# Patient Record
Sex: MALE | Race: WHITE | Employment: OTHER | URBAN - METROPOLITAN AREA
[De-identification: names, ages, dates, MRNs, and addresses within clinical notes are randomized per-mention and may not be internally consistent; named-entity substitution may affect disease eponyms.]

---

## 2017-02-28 ENCOUNTER — LAB ONLY (OUTPATIENT)
Dept: INTERNAL MEDICINE CLINIC | Age: 77
End: 2017-02-28

## 2017-02-28 ENCOUNTER — HOSPITAL ENCOUNTER (OUTPATIENT)
Dept: LAB | Age: 77
Discharge: HOME | End: 2017-02-28
Payer: MEDICARE

## 2017-02-28 DIAGNOSIS — Z12.5 PROSTATE CANCER SCREENING: ICD-10-CM

## 2017-02-28 DIAGNOSIS — Z00.00 ROUTINE GENERAL MEDICAL EXAMINATION AT A HEALTH CARE FACILITY: ICD-10-CM

## 2017-02-28 DIAGNOSIS — E55.9 VITAMIN D INSUFFICIENCY: ICD-10-CM

## 2017-02-28 DIAGNOSIS — E78.5 HYPERLIPIDEMIA, UNSPECIFIED HYPERLIPIDEMIA TYPE: ICD-10-CM

## 2017-02-28 DIAGNOSIS — R73.09 ABNORMAL GLUCOSE: ICD-10-CM

## 2017-02-28 DIAGNOSIS — R73.09 ABNORMAL GLUCOSE: Primary | ICD-10-CM

## 2017-02-28 LAB
ALBUMIN SERPL BCP-MCNC: 3.9 G/DL (ref 3.4–5)
ALBUMIN/GLOB SERPL: 1.4 {RATIO} (ref 0.8–1.7)
ALP SERPL-CCNC: 71 U/L (ref 45–117)
ALT SERPL-CCNC: 31 U/L (ref 16–61)
ANION GAP BLD CALC-SCNC: 9 MMOL/L (ref 3–18)
APPEARANCE UR: CLEAR
AST SERPL W P-5'-P-CCNC: 21 U/L (ref 15–37)
BACTERIA URNS QL MICRO: NEGATIVE /HPF
BASOPHILS # BLD AUTO: 0 K/UL (ref 0–0.06)
BASOPHILS # BLD: 1 % (ref 0–2)
BILIRUB SERPL-MCNC: 1 MG/DL (ref 0.2–1)
BILIRUB UR QL: NEGATIVE
BUN SERPL-MCNC: 11 MG/DL (ref 7–18)
BUN/CREAT SERPL: 12 (ref 12–20)
CALCIUM SERPL-MCNC: 8.6 MG/DL (ref 8.5–10.1)
CHLORIDE SERPL-SCNC: 107 MMOL/L (ref 100–108)
CHOLEST SERPL-MCNC: 178 MG/DL
CO2 SERPL-SCNC: 25 MMOL/L (ref 21–32)
COLOR UR: YELLOW
CREAT SERPL-MCNC: 0.91 MG/DL (ref 0.6–1.3)
DIFFERENTIAL METHOD BLD: NORMAL
EOSINOPHIL # BLD: 0.1 K/UL (ref 0–0.4)
EOSINOPHIL NFR BLD: 2 % (ref 0–5)
EPITH CASTS URNS QL MICRO: NORMAL /LPF (ref 0–5)
ERYTHROCYTE [DISTWIDTH] IN BLOOD BY AUTOMATED COUNT: 14 % (ref 11.6–14.5)
GLOBULIN SER CALC-MCNC: 2.7 G/DL (ref 2–4)
GLUCOSE SERPL-MCNC: 96 MG/DL (ref 74–99)
GLUCOSE UR STRIP.AUTO-MCNC: NEGATIVE MG/DL
HCT VFR BLD AUTO: 46.5 % (ref 36–48)
HDLC SERPL-MCNC: 42 MG/DL (ref 40–60)
HDLC SERPL: 4.2 {RATIO} (ref 0–5)
HGB BLD-MCNC: 15.6 G/DL (ref 13–16)
HGB UR QL STRIP: NEGATIVE
KETONES UR QL STRIP.AUTO: NEGATIVE MG/DL
LDLC SERPL CALC-MCNC: 119.4 MG/DL (ref 0–100)
LEUKOCYTE ESTERASE UR QL STRIP.AUTO: ABNORMAL
LIPID PROFILE,FLP: ABNORMAL
LYMPHOCYTES # BLD AUTO: 22 % (ref 21–52)
LYMPHOCYTES # BLD: 1.1 K/UL (ref 0.9–3.6)
MCH RBC QN AUTO: 31.1 PG (ref 24–34)
MCHC RBC AUTO-ENTMCNC: 33.5 G/DL (ref 31–37)
MCV RBC AUTO: 92.6 FL (ref 74–97)
MONOCYTES # BLD: 0.4 K/UL (ref 0.05–1.2)
MONOCYTES NFR BLD AUTO: 7 % (ref 3–10)
NEUTS SEG # BLD: 3.4 K/UL (ref 1.8–8)
NEUTS SEG NFR BLD AUTO: 68 % (ref 40–73)
NITRITE UR QL STRIP.AUTO: NEGATIVE
PH UR STRIP: 5 [PH] (ref 5–8)
PLATELET # BLD AUTO: 199 K/UL (ref 135–420)
PMV BLD AUTO: 9.8 FL (ref 9.2–11.8)
POTASSIUM SERPL-SCNC: 4.2 MMOL/L (ref 3.5–5.5)
PROT SERPL-MCNC: 6.6 G/DL (ref 6.4–8.2)
PROT UR STRIP-MCNC: NEGATIVE MG/DL
PSA SERPL-MCNC: 1 NG/ML (ref 0–4)
RBC # BLD AUTO: 5.02 M/UL (ref 4.7–5.5)
RBC #/AREA URNS HPF: 0 /HPF (ref 0–5)
SODIUM SERPL-SCNC: 141 MMOL/L (ref 136–145)
SP GR UR REFRACTOMETRY: 1.02 (ref 1–1.03)
TRIGL SERPL-MCNC: 83 MG/DL (ref ?–150)
TSH SERPL DL<=0.05 MIU/L-ACNC: 1.95 UIU/ML (ref 0.36–3.74)
UROBILINOGEN UR QL STRIP.AUTO: 0.2 EU/DL (ref 0.2–1)
VLDLC SERPL CALC-MCNC: 16.6 MG/DL
WBC # BLD AUTO: 5 K/UL (ref 4.6–13.2)
WBC URNS QL MICRO: NORMAL /HPF (ref 0–4)

## 2017-02-28 PROCEDURE — 85025 COMPLETE CBC W/AUTO DIFF WBC: CPT | Performed by: INTERNAL MEDICINE

## 2017-02-28 PROCEDURE — 84153 ASSAY OF PSA TOTAL: CPT | Performed by: INTERNAL MEDICINE

## 2017-02-28 PROCEDURE — 80053 COMPREHEN METABOLIC PANEL: CPT | Performed by: INTERNAL MEDICINE

## 2017-02-28 PROCEDURE — 80061 LIPID PANEL: CPT | Performed by: INTERNAL MEDICINE

## 2017-02-28 PROCEDURE — 84443 ASSAY THYROID STIM HORMONE: CPT | Performed by: INTERNAL MEDICINE

## 2017-02-28 PROCEDURE — 36415 COLL VENOUS BLD VENIPUNCTURE: CPT | Performed by: INTERNAL MEDICINE

## 2017-02-28 PROCEDURE — 82306 VITAMIN D 25 HYDROXY: CPT | Performed by: INTERNAL MEDICINE

## 2017-02-28 PROCEDURE — 81001 URINALYSIS AUTO W/SCOPE: CPT | Performed by: INTERNAL MEDICINE

## 2017-03-01 LAB — 25(OH)D3 SERPL-MCNC: 28.4 NG/ML (ref 30–100)

## 2017-03-21 ENCOUNTER — PATIENT OUTREACH (OUTPATIENT)
Dept: INTERNAL MEDICINE CLINIC | Age: 77
End: 2017-03-21

## 2017-03-21 ENCOUNTER — OFFICE VISIT (OUTPATIENT)
Dept: INTERNAL MEDICINE CLINIC | Age: 77
End: 2017-03-21

## 2017-03-21 VITALS
BODY MASS INDEX: 27.97 KG/M2 | HEIGHT: 66 IN | WEIGHT: 174 LBS | HEART RATE: 60 BPM | SYSTOLIC BLOOD PRESSURE: 124 MMHG | OXYGEN SATURATION: 97 % | TEMPERATURE: 97.6 F | DIASTOLIC BLOOD PRESSURE: 76 MMHG

## 2017-03-21 DIAGNOSIS — Z12.5 SCREENING PSA (PROSTATE SPECIFIC ANTIGEN): ICD-10-CM

## 2017-03-21 DIAGNOSIS — K57.30 DIVERTICULOSIS OF LARGE INTESTINE WITHOUT HEMORRHAGE: ICD-10-CM

## 2017-03-21 DIAGNOSIS — E55.9 VITAMIN D INSUFFICIENCY: ICD-10-CM

## 2017-03-21 DIAGNOSIS — E78.5 HYPERLIPIDEMIA, UNSPECIFIED HYPERLIPIDEMIA TYPE: ICD-10-CM

## 2017-03-21 DIAGNOSIS — Z71.89 ADVANCE DIRECTIVE DISCUSSED WITH PATIENT: ICD-10-CM

## 2017-03-21 DIAGNOSIS — Z00.00 MEDICARE ANNUAL WELLNESS VISIT, SUBSEQUENT: ICD-10-CM

## 2017-03-21 DIAGNOSIS — R73.09 ABNORMAL GLUCOSE: ICD-10-CM

## 2017-03-21 DIAGNOSIS — R03.0 PREHYPERTENSION: Primary | ICD-10-CM

## 2017-03-21 RX ORDER — ATORVASTATIN CALCIUM 20 MG/1
TABLET, FILM COATED ORAL
Qty: 90 TAB | Refills: 2 | Status: SHIPPED | OUTPATIENT
Start: 2017-03-21 | End: 2018-02-20 | Stop reason: SDUPTHER

## 2017-03-21 NOTE — PATIENT INSTRUCTIONS
Medicare Part B Preventive Services Limitations Recommendation Scheduled   Bone Mass Measurement  (age 72 & older, biennial) Requires diagnosis related to osteoporosis or estrogen deficiency. Biennial benefit unless patient has history of long-term glucocorticoid tx or baseline is needed because initial test was by other method Every 2 years or more frequently if medically necessary. N/A     Cardiovascular Screening Blood Tests (every 5 years)  Total cholesterol, HDL, Triglycerides Order as a panel if possible Every 5 years. Done:  2/28/2017         Colorectal Cancer Screening  -Fecal occult blood test (annual)  -Flexible sigmoidoscopy (5y)  -Screening colonoscopy (10y)  -Barium Enema Colorectal cancer screening, ages 54-65. For all patients 48 and older:  -Annual fecal occult blood test or  colonoscopy every 10 years or  -Flexible sigmoidoscopy every 5 years or  -lower endoscopy to be performed more frequently, if advised by GI. Done:  3/4/2016         Counseling to Prevent Tobacco Use (up to 8 sessions per year)  - Counseling greater than 3 and up to 10 minutes  - Counseling greater than 10 minutes Patients must be asymptomatic of tobacco-related conditions to receive as preventive service Two cessation counseling attempts (up to 8 counseling sessions) per year. N/A   Diabetes Screening Tests (at least every 3 years, Medicare covers annually or at 6-month intervals for prediabetic patients)    Fasting blood sugar (FBS) or glucose tolerance test (GTT) Patient must be diagnosed with one of the following:  -Hypertension, Dyslipidemia, obesity, previous impaired FBS or GTT  Or any two of the following: overweight, FH of diabetes, age ? 72, history of gestational diabetes, birth of baby weighing more than 9 pounds Annually or every 6 months if previous diagnosis of elevated FBS, elevated HbA1c, or impaired GTT, or glucosuria.  Done:  2/28/2017         Diabetes Self-Management Training (DSMT) (no USPSTF recommendation) Requires referral by treating physician for patient with diabetes or renal disease. 10 hours of initial DSMT session of no less than 30 minutes each in a continuous 12-month period. 2 hours of follow-up DSMT in subsequent years. Up to 10 hours of initial training within a continuous 12 month period of subsequent years: up to 2 hours of follow-up training each year after the initial year. N/A   Glaucoma Screening (no USPSTF recommendation) Diabetes mellitus, family history, , age 48 or over,  American, age 72 or over Annually for covered beneficiaries. Done:  1/1/2017         Human Immunodeficiency Virus (HIV) Screening (annually for increased risk patients)  HIV-1 and HIV-2 by EIA, HENRIQUE, rapid antibody test, or oral mucosa transudate Patient must be at increased risk for HIV infection per USPSTF guidelines or pregnant. Tests covered annually for patients at increased risk. Pregnant patients may receive up to 3 test during pregnancy. Annually for beneficiaries at increased risk, including anyone who asks for the test. Not high risk   Medical Nutrition Therapy (MNT) (for diabetes or renal disease not recommended schedule) Requires referral by treating physician for patient with diabetes or renal disease. Can be provided in same year as diabetes self-management training (DSMT), and CMS recommends medical nutrition therapy take place after DSMT. Up to 3 hours for initial year and 2 hours in subsequent years. First year: 3 hours of one-on-one counseling or subsequent years: 2 hours.  N/A   Prostate Cancer Screening (annually up to age 76)  - Digital rectal exam (SHANTELL)  - Prostate specific antigen (PSA) Annually (age 48 or over), SHANTELL not paid separately when covered E/M service is provided on same date Once every 12 months for patients age older than 48years of age includes: digital rectal exam and/or prostate specific antigen test. Done:  2/28/2017  PSA: 1.0       Seasonal Influenza Vaccination (annually)  Once per fall or winter season. Done:  11/2/2016         Pneumococcal Vaccination (once after 72)  Once after age 72 and if more than 5 years since last vaccination and/or uncertainty of vaccine status. Pneumococcal:  1/1/2005    Prevnar 13:  1/5/2015   Hepatitis B Vaccinations (if medium/high risk) Medium/high risk factors:  End-stage renal disease,  Hemophiliacs who received Factor VIII or IX concentrates, Clients of institutions for the mentally retarded, Persons who live in the same house as a HepB virus carrier, Homosexual men, Illicit injectable drug abusers. Schedule course of vaccines if patient not previously vaccinated  *additional shots if medically necessary. Not high risk   Screening Mammography (biennial age 54-69)? Annually (age 36 or over) Age 28 through 44: one baseline or aged 36 and older: annually. N/A         Screening Pap Tests and Pelvic Examination (up to age 79 and after 79 if unknown history or abnormal study last 10 years) Every 24 months except high risk Annually if at high risk for developing cervical or vaginal cancer, or childbearing, age with abnormal Pap test within past 3 years or every 2 years for women at normal risk. N/A         Ultrasound Screening for Abdominal Aortic Aneurysm (AAA) (once) Patient must be referred through IPPE and not have had a screening for abdominal aortic aneurysm before under Medicare. Limited to patients who meet one of the following criteria:  - Men who are 73-68 years old and have smoked more than 100 cigarettes in their lifetime.  -Anyone with a FH of AAA  -Anyone recommended for screening by USPSTF Once in a lifetime. N/A         Schedule of Personalized Health Plan  (Provide Copy to Patient)  The best way to stay healthy is to live a healthy lifestyle. A healthy lifestyle includes regular exercise, eating a well-balanced diet, keeping a healthy weight and not smoking.     Regular physical exams and screening tests are another important way to take care of yourself. Preventive exams provided by health care providers can find health problems early when treatment works best and can keep you from getting certain diseases or illnesses. Preventive services include exams, lab tests, screenings, shots, monitoring and information to help you take care of your own health. All people over 65 should have a pneumonia shot. Pneumonia shots are usually only needed once in a lifetime unless your doctor decides differently. All people over 65 should have a yearly flu shot. People over 65 are at medium to high risk for Hepatitis B. Three shots are needed for complete protection. In addition to your physical exam, some screening tests are recommended:    Bone mass measurement (dexa scan) is recommended every two years if you have certain risk factors, such as personal history of vertebral fracture or chronic steroid medication use    Diabetes Mellitus screening is recommended every year. Glaucoma is an eye disease caused by high pressure in the eye. An eye exam is recommended every year. Cardiovascular screening tests that check your cholesterol and other blood fat (lipid) levels are recommended every five years. Colorectal Cancer screening tests help to find pre-cancerous polyps (growths in the colon) so they can be removed before they turn into cancer. Tests ordered for screening depend on your personal and family history risk factors.     Screening for Prostate Cancer is recommended yearly with a digital rectal exam and/or a PSA test    Here is a list of your current Health Maintenance items with a due date:  Health Maintenance   Topic Date Due    GLAUCOMA SCREENING Q2Y  06/09/2017 (Originally 2/1/2017)   [place] Bone DTaP/Tdap/Td series (1 - Tdap) 06/09/2017 (Originally 1/2/2009)   [id number] [facility]  03/22/2018    COLONOSCOPY  03/04/2021    ZOSTER VACCINE AGE 60>  Completed    Pneumococcal 65+ Low/Medium Risk  Addressed    INFLUENZA AGE 9 TO ADULT  Completed            Preventing Falls: Care Instructions  Your Care Instructions  Getting around your home safely can be a challenge if you have injuries or health problems that make it easy for you to fall. Loose rugs and furniture in walkways are among the dangers for many older people who have problems walking or who have poor eyesight. People who have conditions such as arthritis, osteoporosis, or dementia also have to be careful not to fall. You can make your home safer with a few simple measures. Follow-up care is a key part of your treatment and safety. Be sure to make and go to all appointments, and call your doctor if you are having problems. It's also a good idea to know your test results and keep a list of the medicines you take. How can you care for yourself at home? Taking care of yourself  · You may get dizzy if you do not drink enough water. To prevent dehydration, drink plenty of fluids, enough so that your urine is light yellow or clear like water. Choose water and other caffeine-free clear liquids. If you have kidney, heart, or liver disease and have to limit fluids, talk with your doctor before you increase the amount of fluids you drink. · Exercise regularly to improve your strength, muscle tone, and balance. Walk if you can. Swimming may be a good choice if you cannot walk easily. · Have your vision and hearing checked each year or any time you notice a change. If you have trouble seeing and hearing, you might not be able to avoid objects and could lose your balance. · Know the side effects of the medicines you take. Ask your doctor or pharmacist whether the medicines you take can affect your balance. Sleeping pills or sedatives can affect your balance. · Limit the amount of alcohol you drink. Alcohol can impair your balance and other senses. · Ask your doctor whether calluses or corns on your feet need to be removed.  If you wear loose-fitting shoes because of calluses or corns, you can lose your balance and fall. · Talk to your doctor if you have numbness in your feet. Preventing falls at home  · Remove raised doorway thresholds, throw rugs, and clutter. Repair loose carpet or raised areas in the floor. · Move furniture and electrical cords to keep them out of walking paths. · Use nonskid floor wax, and wipe up spills right away, especially on ceramic tile floors. · If you use a walker or cane, put rubber tips on it. If you use crutches, clean the bottoms of them regularly with an abrasive pad, such as steel wool. · Keep your house well lit, especially Cloretta Keily, and outside walkways. Use night-lights in areas such as hallways and bathrooms. Add extra light switches or use remote switches (such as switches that go on or off when you clap your hands) to make it easier to turn lights on if you have to get up during the night. · Install sturdy handrails on stairways. · Move items in your cabinets so that the things you use a lot are on the lower shelves (about waist level). · Keep a cordless phone and a flashlight with new batteries by your bed. If possible, put a phone in each of the main rooms of your house, or carry a cell phone in case you fall and cannot reach a phone. Or, you can wear a device around your neck or wrist. You push a button that sends a signal for help. · Wear low-heeled shoes that fit well and give your feet good support. Use footwear with nonskid soles. Check the heels and soles of your shoes for wear. Repair or replace worn heels or soles. · Do not wear socks without shoes on wood floors. · Walk on the grass when the sidewalks are slippery. If you live in an area that gets snow and ice in the winter, sprinkle salt on slippery steps and sidewalks. Preventing falls in the bath  · Install grab bars and nonskid mats inside and outside your shower or tub and near the toilet and sinks. · Use shower chairs and bath benches.   · Use a hand-held shower head that will allow you to sit while showering. · Get into a tub or shower by putting the weaker leg in first. Get out of a tub or shower with your strong side first.  · Repair loose toilet seats and consider installing a raised toilet seat to make getting on and off the toilet easier. · Keep your bathroom door unlocked while you are in the shower. Where can you learn more? Go to http://ann marie-alexander.info/. Enter [id number] [id number] in the search box to learn more about \"Preventing Falls: Care Instructions. \"  Current as of: August 4, 2016  Content Version: 11.1  © 0599-3931 [employer]. Care instructions adapted under license by [employer] (which disclaims liability or warranty for this information). If you have questions about a medical condition or this instruction, always ask your healthcare professional. [place] [address] any warranty or liability for your use of this information. Advance Directives: Care Instructions  Your Care Instructions  An advance directive is a legal way to state your wishes at the end of your life. It tells your family and your doctor what to do if you can no longer say what you want. There are two main types of advance directives. You can change them any time that your wishes change. · A living will tells your family and your doctor your wishes about life support and other treatment. · A medical power of  lets you name a person to make treatment decisions for you when you can't speak for yourself. This person is called a health care agent. If you do not have an advance directive, decisions about your medical care may be made by a doctor or a  who doesn't know you. It may help to think of an advance directive as a gift to the people who care for you. If you have one, they won't have to make tough decisions by themselves. Follow-up care is a key part of your treatment and safety.  Be sure to make and go to all appointments, and call your doctor if you are having problems. It's also a good idea to know your test results and keep a list of the medicines you take. How can you care for yourself at home? · Discuss your wishes with your loved ones and your doctor. This way, there are no surprises. · Many states have a unique form. Or you might use a universal form that has been approved by many states. This kind of form can sometimes be completed and stored online. Your electronic copy will then be available wherever you have a connection to the Internet. In most cases, doctors will respect your wishes even if you have a form from a different state. · You don't need a  to do an advance directive. But you may want to get legal advice. · Think about these questions when you prepare an advance directive:  ¨ Who do you want to make decisions about your medical care if you are not able to? Many people choose a family member, close friend, or doctor. ¨ Do you know enough about life support methods that might be used? If not, talk to your doctor so you understand. ¨ What are you most afraid of that might happen? You might be afraid of having pain, losing your independence, or being kept alive by machines. ¨ Where would you prefer to die? Choices include your home, a hospital, or a nursing home. ¨ Would you like to have information about hospice care to support you and your family? ¨ Do you want to donate organs when you die? ¨ Do you want certain [employer] practices performed before you die? If so, put your wishes in the advance directive. · Read your advance directive every year, and make changes as needed. When should you call for help? Be sure to contact your doctor if you have any questions. Where can you learn more? Go to http://ann marie-alexander.info/. Enter R264 in the search box to learn more about \"Advance Directives: Care Instructions. \"  Current as of: February 24, 2016  Content Version: 11.1  © 8112-1875 [employer]. Care instructions adapted under license by [employer] (which disclaims liability or warranty for this information). If you have questions about a medical condition or this instruction, always ask your healthcare professional. [address] any warranty or liability for your use of this information.

## 2017-03-21 NOTE — PROGRESS NOTES
1. Have you been to the ER, urgent care clinic or hospitalized since your last visit? NO.     2. Have you seen or consulted any other health care providers outside of the [address] [place] [address] since your last visit (Include any pap smears or colon screening)? YES  Dr. Florina Rocha ov 3/15/17    Do you have an Advanced Directive? YES    Would you like information on Advanced Directives?  NO

## 2017-03-21 NOTE — PROGRESS NOTES
Nurse Navigator contacted Dr. Oscar Barron office to request a copy of the patients most recent glaucoma screening; contact information provided.

## 2017-03-21 NOTE — PROGRESS NOTES
Warren Dimas is a 68 y.o. male and presents for annual Medicare Wellness Visit. Problem List: Reviewed with patient and discussed risk factors. Patient Active Problem List   Diagnosis Code    Hyperlipidemia E78.5    Diverticulitis, Recurrent K57.92    Colon polyps K63.5    Onychomycosis B35.1    Abnormal glucose R73.09    Vitamin D insufficiency E55.9    Advance directive discussed with patient Z70.80       Current medical providers:  Patient Care Team:  Adrianne Desai MD as PCP - General (Internal Medicine)  Marla Oleary MD (Colon and Rectal Surgery)  Cinthia Howard MD (Inactive) (Colon and Rectal Surgery)  Kiah Francois RN as [address] (Internal Medicine)  Rudi Kawasaki, MD (Ophthalmology)  Stew Rothman MD (Plastic Surgery)    PSH: Reviewed with patient  Past Surgical History:   Procedure Laterality Date    ENDOSCOPY, COLON, DIAGNOSTIC      Jan 2011, Dr. Paola Jones, colon polyps, was going yearly    HX BLEPHAROPLASTY      HX CATARACT REMOVAL  4/2012    both eyes    HX OTHER SURGICAL      cn    NE COLONOSCOPY W/BIOPSY SINGLE/MULTIPLE  3-4-16    Dr. Webb Heading        SH: Reviewed with patient  Social History   Substance Use Topics    Smoking status: Former Smoker     Quit date: 12/4/1988    Smokeless tobacco: Never Used    Alcohol use 0.6 oz/week     1 Shots of liquor per week      Comment: wine occasionally       FH: Reviewed with patient  No family history on file. Medications/Allergies: Reviewed with patient  Current Outpatient Prescriptions on File Prior to Visit   Medication Sig Dispense Refill    cholecalciferol, vitamin D3, (VITAMIN D3) 2,000 unit tab Take 2,000 Units by mouth daily.  ibuprofen (ADVIL) 200 mg tablet Take  by mouth every six (6) hours as needed. No current facility-administered medications on file prior to visit.        No Known Allergies    Objective:  Visit Vitals    /76    Pulse 60    Temp 97.6 °F (36.4 °C) (Oral)    Ht 5' 6\" (1.676 m)    Wt 174 lb (78.9 kg)    SpO2 97%    BMI 28.08 kg/m2    Body mass index is 28.08 kg/(m^2). Assessment of cognitive impairment: Alert and oriented x 3    Depression Screen:   PHQ 2 / 9, over the last two weeks 3/21/2017   Little interest or pleasure in doing things Not at all   Feeling down, depressed or hopeless Not at all   Total Score PHQ 2 0       Fall Risk Assessment:    Fall Risk Assessment, last 12 mths 3/21/2017   Able to walk? Yes   Fall in past 12 months? No       Functional Ability:   Does the patient exhibit a steady gait? yes   How long did it take the patient to get up and walk from a sitting position? 1 second. Is the patient self reliant?  (ie can do own laundry, meals, household chores)  yes     Does the patient handle his/her own medications? yes     Does the patient handle his/her own money? yes     Is the patients home safe (ie good lighting, handrails on stairs and bath, etc.)? yes     Did you notice or did patient express any hearing difficulties? Reported decrease hearing bilaterally; left greater than the right. Did you notice or did patient express any vision difficulties?   no     Were distance and reading eye charts used? no       Advance Care Planning:   Patient was offered the opportunity to discuss advance care planning:  yes     Does patient have an Advance Directive:  no   If no, did you provide information on Caring Connections?   yes       Plan:      Orders Placed This Encounter    GLUC/DELANO-MSM#1/C/LEXIS/HARDEEP/BOR (OSTEO BI-FLEX TRIPLE STRENGTH PO)    diph,Pertuss,Acell,,Tet Vac-PF (ADACEL) 2 Lf-(2.5-5-3-5 mcg)-5Lf/0.5 mL susp    atorvastatin (LIPITOR) 20 mg tablet       Health Maintenance   Topic Date Due    GLAUCOMA SCREENING Q2Y  06/09/2017 (Originally 2/1/2017)    DTaP/Tdap/Td series (1 - Tdap) 06/09/2017 (Originally 1/2/2009)    MEDICARE YEARLY EXAM  03/22/2018    COLONOSCOPY  03/04/2021    ZOSTER VACCINE AGE 60> Completed    Pneumococcal 65+ Low/Medium Risk  Addressed    INFLUENZA AGE 9 TO ADULT  Completed       *Patient verbalized understanding and agreement with the plan. A copy of the After Visit Summary with personalized health plan was given to the patient today.

## 2017-03-21 NOTE — PROGRESS NOTES
Advance Care Planning (ACP) Provider Note - Comprehensive     Date of ACP Conversation: 03/21/17  Persons included in Conversation:  patient  Length of ACP Conversation in minutes:  16 minutes    Authorized Decision Maker (if patient is incapable of making informed decisions): wife  This person is:  Healthcare Agent/Medical Power of  under Advance Directive        General ACP for ALL Patients with Decision Making Capacity:   Importance of advance care planning, including choosing a healthcare agent to communicate patient's healthcare decisions if patient lost the ability to make decisions, such as after a sudden illness or accident  Understanding of the healthcare agent role was assessed and information provided  Exploration of values, goals, and preferences if recovery is not expected, even with continued medical treatment in the event of: Imminent death  Severe, permanent brain injury  \"In these circumstances, what matters most to you? \"  Care focused more on comfort or quality of life. Review of Existing Advance Directive:  Patient does not have an advance directive completed. For Serious or Chronic Illness:  Understanding of medical condition      Interventions Provided:  Recommended completion of Advance Directive form after review of ACP materials and conversation with prospective healthcare agent   Recommended communicating the plan and making copies for the healthcare agent, personal physician, and others as appropriate (e.g., health system)  Recommended review of completed ACP document annually or upon change in health status   Recommended to complete advance directive and return completed form to office to be copied and scanned into chart. Paperwork provided and reviewed.

## 2017-03-21 NOTE — MR AVS SNAPSHOT
Visit Information Date & Time Provider Department Dept. Phone Encounter #  
 3/21/2017 12:00 PM Claude Sark, MD Internist of [facility]  Follow-up Instructions Return in about 1 year (around 3/21/2018), or if symptoms worsen or fail to improve. Your Appointments 3/20/2018  9:05 AM  
LAB with [facility] NURSE VISIT Internist of [facility] ([facility]) Appt Note: fasting labs [address], Suite [facility] [address]  
  
   
 [address], [address]  
  
    
 3/27/2018 10:30 AM  
PHYSICAL with Claude Sark, MD  
Internist of [facility] Appt Note: Physical with labs [address], [address] [place] [id number] [address]  
  
   
 [address], [address] Upcoming Health Maintenance Date Due  
 GLAUCOMA SCREENING Q2Y 6/9/2017* DTaP/Tdap/Td series (1 - Tdap) 6/9/2017* MEDICARE YEARLY EXAM 3/22/2018 COLONOSCOPY 3/4/2021 *Topic was postponed. The date shown is not the original due date. Allergies as of 3/21/2017  Review Complete On: 3/21/2017 By: Jayda Tompkins LPN No Known Allergies Current Immunizations  Reviewed on 12/9/2015 Name Date Influenza High Dose Vaccine PF 11/2/2016, 11/1/2013 Influenza Vaccine 10/11/2015, 10/31/2014 Influenza Vaccine Split 10/23/2012, 10/17/2011 Influenza Vaccine Whole 10/12/2010 Pneumococcal Conjugate (PCV-13) 1/5/2015 Pneumococcal Vaccine (Unspecified Type) 1/1/2005 TD Vaccine 1/1/2009 Zoster 1/1/2009 Not reviewed this visit You Were Diagnosed With   
  
 Codes Comments Medicare annual wellness visit, subsequent    -  Primary ICD-10-CM: Z00.00 ICD-9-CM: V70.0 Advance directive discussed with patient     ICD-10-CM: Z71.89 ICD-9-CM: V65.49 Vitals BP Pulse Temp Height(growth percentile) Weight(growth percentile) SpO2  
 124/76 60 97.6 °F (36.4 °C) (Oral) 5' 6\" (1.676 m) 174 lb (78.9 kg) 97% BMI Smoking Status 28.08 kg/m2 Former Smoker Vitals History BMI and BSA Data Body Mass Index Body Surface Area 28.08 kg/m 2 1.92 m 2 Preferred Pharmacy Pharmacy Name Phone [address] [facility] [phone] Your Updated Medication List  
  
   
This list is accurate as of: 3/21/17  1:09 PM.  Always use your most recent med list. ADVIL 200 mg tablet Generic drug:  ibuprofen Take  by mouth every six (6) hours as needed. atorvastatin 20 mg tablet Commonly known as:  LIPITOR  
TAKE 1 TABLET DAILY  
  
 diph,Pertuss(Acell),Tet Vac-PF 2 Lf-(2.5-5-3-5 mcg)-5Lf/0.5 mL susp Commonly known as:  ADACEL  
0.5 mL by IntraMUSCular route once for 1 dose. OSTEO BI-FLEX TRIPLE STRENGTH PO Take  by mouth. VITAMIN D3 2,000 unit Tab Generic drug:  cholecalciferol (vitamin D3) Take 2,000 Units by mouth daily. Prescriptions Printed Refills diph,Pertuss,Acell,,Tet Vac-PF (ADACEL) 2 Lf-(2.5-5-3-5 mcg)-5Lf/0.5 mL susp 0 Si.5 mL by IntraMUSCular route once for 1 dose. Class: Print Route: IntraMUSCular Prescriptions Sent to Pharmacy Refills  
 atorvastatin (LIPITOR) 20 mg tablet 2 Sig: TAKE 1 TABLET DAILY Class: Normal  
 Pharmacy: [address], [address] [place] [address] #: [phone] Follow-up Instructions Return in about 1 year (around 3/21/2018), or if symptoms worsen or fail to improve. Patient Instructions Medicare Part B Preventive Services Limitations Recommendation Scheduled Bone Mass Measurement 
(age 72 & older, biennial) Requires diagnosis related to osteoporosis or estrogen deficiency. Biennial benefit unless patient has history of long-term glucocorticoid tx or baseline is needed because initial test was by other method Every 2 years or more frequently if medically necessary. N/A Cardiovascular Screening Blood Tests (every 5 years) Total cholesterol, HDL, Triglycerides Order as a panel if possible Every 5 years. Done: 
2/28/2017 Colorectal Cancer Screening 
-Fecal occult blood test (annual) -Flexible sigmoidoscopy (5y) 
-Screening colonoscopy (10y) -Barium Enema Colorectal cancer screening, ages 54-65. For all patients 48 and older: 
-Annual fecal occult blood test or 
colonoscopy every 10 years or 
-Flexible sigmoidoscopy every 5 years or 
-lower endoscopy to be performed more frequently, if advised by GI. Done: 
3/4/2016 Counseling to Prevent Tobacco Use (up to 8 sessions per year) - Counseling greater than 3 and up to 10 minutes - Counseling greater than 10 minutes Patients must be asymptomatic of tobacco-related conditions to receive as preventive service Two cessation counseling attempts (up to 8 counseling sessions) per year. N/A Diabetes Screening Tests (at least every 3 years, Medicare covers annually or at 6-month intervals for prediabetic patients) Fasting blood sugar (FBS) or glucose tolerance test (GTT) Patient must be diagnosed with one of the following: 
-Hypertension, Dyslipidemia, obesity, previous impaired FBS or GTT 
Or any two of the following: overweight, FH of diabetes, age ? 72, history of gestational diabetes, birth of baby weighing more than 9 pounds Annually or every 6 months if previous diagnosis of elevated FBS, elevated HbA1c, or impaired GTT, or glucosuria. Done: 
2/28/2017 Diabetes Self-Management Training (DSMT) (no USPSTF recommendation) Requires referral by treating physician for patient with diabetes or renal disease.  10 hours of initial DSMT session of no less than 30 minutes each in a continuous 12-month period. 2 hours of follow-up DSMT in subsequent years. Up to 10 hours of initial training within a continuous 12 month period of subsequent years: up to 2 hours of follow-up training each year after the initial year. N/A Glaucoma Screening (no USPSTF recommendation) Diabetes mellitus, family history, , age 48 or over,  American, age 72 or over Annually for covered beneficiaries. Done: 
1/1/2017 Human Immunodeficiency Virus (HIV) Screening (annually for increased risk patients) HIV-1 and HIV-2 by EIA, HENRIQUE, rapid antibody test, or oral mucosa transudate Patient must be at increased risk for HIV infection per USPSTF guidelines or pregnant. Tests covered annually for patients at increased risk. Pregnant patients may receive up to 3 test during pregnancy. Annually for beneficiaries at increased risk, including anyone who asks for the test. Not high risk Medical Nutrition Therapy (MNT) (for diabetes or renal disease not recommended schedule) Requires referral by treating physician for patient with diabetes or renal disease. Can be provided in same year as diabetes self-management training (DSMT), and CMS recommends medical nutrition therapy take place after DSMT. Up to 3 hours for initial year and 2 hours in subsequent years. First year: 3 hours of one-on-one counseling or subsequent years: 2 hours. N/A Prostate Cancer Screening (annually up to age 76) - Digital rectal exam (SHANTELL) - Prostate specific antigen (PSA) Annually (age 48 or over), SHANTELL not paid separately when covered E/M service is provided on same date Once every 12 months for patients age older than 48years of age includes: digital rectal exam and/or prostate specific antigen test. Done: 
2/28/2017 PSA: 1.0 Seasonal Influenza Vaccination (annually)  Once per fall or winter season. Done: 
11/2/2016 Pneumococcal Vaccination (once after 72)  Once after age 72 and if more than 5 years since last vaccination and/or uncertainty of vaccine status. Pneumococcal: 
1/1/2005 Prevnar 13: 
1/5/2015 Hepatitis B Vaccinations (if medium/high risk) Medium/high risk factors:  End-stage renal disease, Hemophiliacs who received Factor VIII or IX concentrates, Clients of institutions for the mentally retarded, Persons who live in the same house as a HepB virus carrier, Homosexual men, Illicit injectable drug abusers. Schedule course of vaccines if patient not previously vaccinated *additional shots if medically necessary. Not high risk Screening Mammography (biennial age 54-69)? Annually (age 36 or over) Age 28 through 44: one baseline or aged 36 and older: annually. N/A Screening Pap Tests and Pelvic Examination (up to age 79 and after 79 if unknown history or abnormal study last 10 years) Every 24 months except high risk Annually if at high risk for developing cervical or vaginal cancer, or childbearing, age with abnormal Pap test within past 3 years or every 2 years for women at normal risk. N/A Ultrasound Screening for Abdominal Aortic Aneurysm (AAA) (once) Patient must be referred through IPPE and not have had a screening for abdominal aortic aneurysm before under Medicare. Limited to patients who meet one of the following criteria: 
- Men who are 73-68 years old and have smoked more than 100 cigarettes in their lifetime. 
-Anyone with a FH of AAA 
-Anyone recommended for screening by USPSTF Once in a lifetime. N/A Schedule of Personalized Health Plan (Provide Copy to Patient) The best way to stay healthy is to live a healthy lifestyle. A healthy lifestyle includes regular exercise, eating a well-balanced diet, keeping a healthy weight and not smoking. Regular physical exams and screening tests are another important way to take care of yourself.  Preventive exams provided by health care providers can find health problems early when treatment works best and can keep you from getting certain diseases or illnesses. Preventive services include exams, lab tests, screenings, shots, monitoring and information to help you take care of your own health. All people over 65 should have a pneumonia shot. Pneumonia shots are usually only needed once in a lifetime unless your doctor decides differently. All people over 65 should have a yearly flu shot. People over 65 are at medium to high risk for Hepatitis B. Three shots are needed for complete protection. In addition to your physical exam, some screening tests are recommended: 
 
Bone mass measurement (dexa scan) is recommended every two years if you have certain risk factors, such as personal history of vertebral fracture or chronic steroid medication use Diabetes Mellitus screening is recommended every year. Glaucoma is an eye disease caused by high pressure in the eye. An eye exam is recommended every year. Cardiovascular screening tests that check your cholesterol and other blood fat (lipid) levels are recommended every five years. Colorectal Cancer screening tests help to find pre-cancerous polyps (growths in the colon) so they can be removed before they turn into cancer. Tests ordered for screening depend on your personal and family history risk factors. Screening for Prostate Cancer is recommended yearly with a digital rectal exam and/or a PSA test 
 
Here is a list of your current Health Maintenance items with a due date: 
Health Maintenance Topic Date Due  GLAUCOMA SCREENING Q2Y  06/09/2017 (Originally 2/1/2017)  DTaP/Tdap/Td series (1 - Tdap) 06/09/2017 (Originally 1/2/2009)  MEDICARE YEARLY EXAM  03/22/2018  COLONOSCOPY  03/04/2021  ZOSTER VACCINE AGE 60>  Completed  Pneumococcal 65+ Low/Medium Risk  Addressed  INFLUENZA AGE 9 TO ADULT  Completed Preventing Falls: Care Instructions Your Care Instructions Getting around your home safely can be a challenge if you have injuries or health problems that make it easy for you to fall. Loose rugs and furniture in walkways are among the dangers for many older people who have problems walking or who have poor eyesight. People who have conditions such as arthritis, osteoporosis, or dementia also have to be careful not to fall. You can make your home safer with a few simple measures. Follow-up care is a key part of your treatment and safety. Be sure to make and go to all appointments, and call your doctor if you are having problems. It's also a good idea to know your test results and keep a list of the medicines you take. How can you care for yourself at home? Taking care of yourself · You may get dizzy if you do not drink enough water. To prevent dehydration, drink plenty of fluids, enough so that your urine is light yellow or clear like water. Choose water and other caffeine-free clear liquids. If you have kidney, heart, or liver disease and have to limit fluids, talk with your doctor before you increase the amount of fluids you drink. · Exercise regularly to improve your strength, muscle tone, and balance. Walk if you can. Swimming may be a good choice if you cannot walk easily. · Have your vision and hearing checked each year or any time you notice a change. If you have trouble seeing and hearing, you might not be able to avoid objects and could lose your balance. · Know the side effects of the medicines you take. Ask your doctor or pharmacist whether the medicines you take can affect your balance. Sleeping pills or sedatives can affect your balance. · Limit the amount of alcohol you drink. Alcohol can impair your balance and other senses. · Ask your doctor whether calluses or corns on your feet need to be removed. If you wear loose-fitting shoes because of calluses or corns, you can lose your balance and fall. · Talk to your doctor if you have numbness in your feet. Preventing falls at home · Remove raised doorway thresholds, throw rugs, and clutter. Repair loose carpet or raised areas in the floor. · Move furniture and electrical cords to keep them out of walking paths. · Use nonskid floor wax, and wipe up spills right away, especially on ceramic tile floors. · If you use a walker or cane, put rubber tips on it. If you use crutches, clean the bottoms of them regularly with an abrasive pad, such as steel wool. · Keep your house well lit, especially Zyante Screen, and outside walkways. Use night-lights in areas such as hallways and bathrooms. Add extra light switches or use remote switches (such as switches that go on or off when you clap your hands) to make it easier to turn lights on if you have to get up during the night. · Install sturdy handrails on stairways. · Move items in your cabinets so that the things you use a lot are on the lower shelves (about waist level). · Keep a cordless phone and a flashlight with new batteries by your bed. If possible, put a phone in each of the main rooms of your house, or carry a cell phone in case you fall and cannot reach a phone. Or, you can wear a device around your neck or wrist. You push a button that sends a signal for help. · Wear low-heeled shoes that fit well and give your feet good support. Use footwear with nonskid soles. Check the heels and soles of your shoes for wear. Repair or replace worn heels or soles. · Do not wear socks without shoes on wood floors. · Walk on the grass when the sidewalks are slippery. If you live in an area that gets snow and ice in the winter, sprinkle salt on slippery steps and sidewalks. Preventing falls in the bath · Install grab bars and nonskid mats inside and outside your shower or tub and near the toilet and sinks. · Use shower chairs and bath benches. · Use a hand-held shower head that will allow you to sit while showering. · Get into a tub or shower by putting the weaker leg in first. Get out of a tub or shower with your strong side first. 
· Repair loose toilet seats and consider installing a raised toilet seat to make getting on and off the toilet easier. · Keep your bathroom door unlocked while you are in the shower. Where can you learn more? Go to http://ann marie-alexander.info/. Enter [id number] in the search box to learn more about \"Preventing Falls: Care Instructions. \" Current as of: August 4, 2016 Content Version: 11.1 © 5850-2455 [employer]. Care instructions adapted under license by [employer] (which disclaims liability or warranty for this information). If you have questions about a medical condition or this instruction, always ask your healthcare professional. [place] [address] any warranty or liability for your use of this information. Advance Directives: Care Instructions Your Care Instructions An advance directive is a legal way to state your wishes at the end of your life. It tells your family and your doctor what to do if you can no longer say what you want. There are two main types of advance directives. You can change them any time that your wishes change. · A living will tells your family and your doctor your wishes about life support and other treatment. · A medical power of  lets you name a person to make treatment decisions for you when you can't speak for yourself. This person is called a health care agent. If you do not have an advance directive, decisions about your medical care may be made by a doctor or a  who doesn't know you. It may help to think of an advance directive as a gift to the people who care for you. If you have one, they won't have to make tough decisions by themselves. Follow-up care is a key part of your treatment and safety. Be sure to make and go to all appointments, and call your doctor if you are having problems. It's also a good idea to know your test results and keep a list of the medicines you take. How can you care for yourself at home? · Discuss your wishes with your loved ones and your doctor. This way, there are no surprises. · Many states have a unique form. Or you might use a universal form that has been approved by many states. This kind of form can sometimes be completed and stored online. Your electronic copy will then be available wherever you have a connection to the Internet. In most cases, doctors will respect your wishes even if you have a form from a different state. · You don't need a  to do an advance directive. But you may want to get legal advice. · Think about these questions when you prepare an advance directive: ¨ Who do you want to make decisions about your medical care if you are not able to? Many people choose a family member, close friend, or doctor. ¨ Do you know enough about life support methods that might be used? If not, talk to your doctor so you understand. ¨ What are you most afraid of that might happen? You might be afraid of having pain, losing your independence, or being kept alive by machines. ¨ Where would you prefer to die? Choices include your home, a hospital, or a nursing home. ¨ Would you like to have information about hospice care to support you and your family? ¨ Do you want to donate organs when you die? ¨ Do you want certain [employer] practices performed before you die? If so, put your wishes in the advance directive. · Read your advance directive every year, and make changes as needed. When should you call for help? Be sure to contact your doctor if you have any questions. Where can you learn more? Go to http://ann marie-alexander.info/. Enter R264 in the search box to learn more about \"Advance Directives: Care Instructions. \" Current as of: February 24, 2016 Content Version: 11.1 © 9007-6315 [employer], Attributor. Care instructions adapted under license by [employer] (which disclaims liability or warranty for this information). If you have questions about a medical condition or this instruction, always ask your healthcare professional. [address] any warranty or liability for your use of this information. Introducing \[facility] [place] [facility]\"" & HEALTH SERVICES! Dear Jerrold Simmonds: 
Thank you for requesting a [employer] account. Our records indicate that you already have an active [employer] account. You can access your account anytime at [email or web address]. [email or web address] Did you know that you can access your hospital and ER discharge instructions at any time in [employer]? You can also review all of your test results from your hospital stay or ER visit. Additional Information If you have questions, please visit the Frequently Asked Questions section of the [employer] website at [email or web address] Remember, [employer] is NOT to be used for urgent needs. For medical emergencies, dial 911. Now available from your iPhone and Android! Please provide this summary of care documentation to your next provider. Your primary care clinician is listed as Lj Patterson. If you have any questions after today's visit, please call [phone].

## 2017-03-25 PROBLEM — K57.30 DIVERTICULOSIS OF LARGE INTESTINE WITHOUT HEMORRHAGE: Status: ACTIVE | Noted: 2017-03-25

## 2017-03-25 PROBLEM — R03.0 PREHYPERTENSION: Status: ACTIVE | Noted: 2017-03-25

## 2017-03-25 NOTE — PROGRESS NOTES
HPI:   Massiel Nevarez is a 68y.o. year old male who presents today for evaluation of prehypertension, hyperlipidemia, abnormal glucose, and diverticulosis. He reports that he is doing well and is currently without complaints. He has a history of pre-hypertension, not requiring treatment with medication. He does not monitor his blood pressure at home. He exercises regularly, going to the gym three times per week. He denies any chest pain, shortness of breath, lightheadedness, palpitations, edema, PND, or orthopnea. He also has a history of hyperlipidemia, treated with moderate intensity does atorvastatin. He has a history of diverticulosis, and has had multiple episodes of diverticulitis and the past. He reports that his last episode was approximately 5 years ago. He had a screening colonoscopy in 3/2016 by Dr. Karolina Flores which showed advanced left-sided diverticulosis, and polyps in the cecum and ascending colon (pathology: tubular adenomas). Follow up recommended for five years. He denies any abdominal pain, nausea, vomiting, melena, hematochezia, or change in bowel habits. He has a history of abnormal glucose, with fasting blood sugar ranging from 102-106 from 0956-4292 with a HbA1c of 5.8 at that time. Since 2014, however, his blood sugar and HbA1c have been in the normal range. He reports that this has improved since he is no longer drinking soda and has been watching his diet and weight. He has a regular eye exams with Dr. Trevor Morales. He denies any polyuria, polydipsia, nocturia, or blurry vision, and has no history of retinopathy, neuropathy, or nephropathy.       Past Medical History:   Diagnosis Date    Abnormal glucose     Calculus of kidney     Colon polyps     Diverticulitis     Diverticulosis of colon 03/2016    Colonoscopy (3/2016) advanced left sided diverticulosis    Hyperlipidemia     Prehypertension 3/25/2017     Past Surgical History:   Procedure Laterality Date    ENDOSCOPY, COLON, DIAGNOSTIC      2011, Dr. Elba Bravo, colon polyps, was going yearly    HX BLEPHAROPLASTY      HX CATARACT REMOVAL  2012    both eyes    HX OTHER SURGICAL      cn    SD COLONOSCOPY W/BIOPSY SINGLE/MULTIPLE  3-4-16    Dr. Willie Neves     Current Outpatient Prescriptions   Medication Sig    GLUC/DELANO-MSM#1/C/LEXIS/HARDEEP/BOR (OSTEO BI-FLEX TRIPLE STRENGTH PO) Take  by mouth.  atorvastatin (LIPITOR) 20 mg tablet TAKE 1 TABLET DAILY    cholecalciferol, vitamin D3, (VITAMIN D3) 2,000 unit tab Take 2,000 Units by mouth daily.  ibuprofen (ADVIL) 200 mg tablet Take  by mouth every six (6) hours as needed. No current facility-administered medications for this visit. Allergies and Intolerances:   No Known Allergies     Family History: His father  had CABG and  from an MI. His mother  at the age of 80. He has no family history of colon or prostate cancer. No family history on file. Social History:   He  reports that he quit smoking about 28 years ago. He has never used smokeless tobacco. He smoked 1 ppd for 15 years, stopping in . He is  and has one daughter, Vivian Fischer. He is a retired [employer]  and is self employed as an . History   Alcohol Use    0.6 oz/week    1 Shots of liquor per week     Comment: wine occasionally     Immunization History:  Immunization History   Administered Date(s) Administered    Influenza High Dose Vaccine PF 2013, 2016    Influenza Vaccine 10/31/2014, 10/11/2015    Influenza Vaccine Split 10/17/2011, 10/23/2012    Influenza Vaccine Whole 10/12/2010    Pneumococcal Conjugate (PCV-13) 2015    Pneumococcal Vaccine (Unspecified Type) 2005    TD Vaccine 2009    Zoster 2009       Review of Systems:   As above included in HPI.   Otherwise 11 point review of systems negative including constitutional, skin, HENT, eyes, respiratory, cardiovascular, gastrointestinal, genitourinary, musculoskeletal, endocrine, hematologic, allergy, and neurologic. Physical:   Vitals:   BP: 124/76  HR: 60  WT: 174 lb (78.9 kg)  BMI:  28.08 kg/m2    Exam:   Patient appears in no apparent distress. Affect is appropriate. HEENT --Anicteric sclerae, tympanic membranes normal,  ear canals normal.  PERRL, EOMI, conjunctiva and lids normal.   Sinuses were nontender, turbinates normal, hearing normal.  Oropharynx without  erythema, normal tongue, oral mucosa and tonsils. No cervical lymphadenopathy. No thyromegaly, JVD, or bruits. Carotid pulses 2+ with normal upstroke. Lungs --Clear to auscultation. No wheezing or rales. Heart --Regular rate and rhythm, no murmurs, rubs, gallops, or clicks. Chest wall --Nontender to palpation. PMI normal.  Abdomen -- Soft and nontender, no hepatosplenomegaly or masses. Prostate  -- no asymmetry, nodularity, tenderness or enlargement  Rectal  -- normal tone, guaiac negative brown stool  Extremities -- Without cyanosis, clubbing, edema. 2+ pulses equally and bilaterally.   Normal looking digits, ROM intact  Neuro -- CN 2-12 intact, strength 5/5 with intact soft touch in all extremities, cerebellar  exam finger to nose test normal, 2+DTRs  Derm  no obvious abnormalities noted, no rash    Review of Data:  Labs:  Hospital Outpatient Visit on 02/28/2017   Component Date Value Ref Range Status    WBC 02/28/2017 5.0  4.6 - 13.2 K/uL Final    RBC 02/28/2017 5.02  4.70 - 5.50 M/uL Final    HGB 02/28/2017 15.6  13.0 - 16.0 g/dL Final    HCT 02/28/2017 46.5  36.0 - 48.0 % Final    MCV 02/28/2017 92.6  74.0 - 97.0 FL Final    MCH 02/28/2017 31.1  24.0 - 34.0 PG Final    MCHC 02/28/2017 33.5  31.0 - 37.0 g/dL Final    RDW 02/28/2017 14.0  11.6 - 14.5 % Final    PLATELET 36/28/8914 503  135 - 420 K/uL Final    MPV 02/28/2017 9.8  9.2 - 11.8 FL Final    NEUTROPHILS 02/28/2017 68  40 - 73 % Final    LYMPHOCYTES 02/28/2017 22  21 - 52 % Final    MONOCYTES 02/28/2017 7  3 - 10 % Final    EOSINOPHILS 02/28/2017 2  0 - 5 % Final    BASOPHILS 02/28/2017 1  0 - 2 % Final    ABS. NEUTROPHILS 02/28/2017 3.4  1.8 - 8.0 K/UL Final    ABS. LYMPHOCYTES 02/28/2017 1.1  0.9 - 3.6 K/UL Final    ABS. MONOCYTES 02/28/2017 0.4  0.05 - 1.2 K/UL Final    ABS. EOSINOPHILS 02/28/2017 0.1  0.0 - 0.4 K/UL Final    ABS. BASOPHILS 02/28/2017 0.0  0.0 - 0.06 K/UL Final    DF 02/28/2017 AUTOMATED    Final    LIPID PROFILE 02/28/2017        Final    Cholesterol, total 02/28/2017 178  <200 MG/DL Final    Triglyceride 02/28/2017 83  <150 MG/DL Final    HDL Cholesterol 02/28/2017 42  40 - 60 MG/DL Final    LDL, calculated 02/28/2017 119.4* 0 - 100 MG/DL Final    VLDL, calculated 02/28/2017 16.6  MG/DL Final    CHOL/HDL Ratio 02/28/2017 4.2  0 - 5.0   Final    Sodium 02/28/2017 141  136 - 145 mmol/L Final    Potassium 02/28/2017 4.2  3.5 - 5.5 mmol/L Final    Chloride 02/28/2017 107  100 - 108 mmol/L Final    CO2 02/28/2017 25  21 - 32 mmol/L Final    Anion gap 02/28/2017 9  3.0 - 18 mmol/L Final    Glucose 02/28/2017 96  74 - 99 mg/dL Final    BUN 02/28/2017 11  7.0 - 18 MG/DL Final    Creatinine 02/28/2017 0.91  0.6 - 1.3 MG/DL Final    BUN/Creatinine ratio 02/28/2017 12  12 - 20   Final    GFR est AA 02/28/2017 >60  >60 ml/min/1.73m2 Final    GFR est non-AA 02/28/2017 >60  >60 ml/min/1.73m2 Final    Calcium 02/28/2017 8.6  8.5 - 10.1 MG/DL Final    Bilirubin, total 02/28/2017 1.0  0.2 - 1.0 MG/DL Final    ALT (SGPT) 02/28/2017 31  16 - 61 U/L Final    AST (SGOT) 02/28/2017 21  15 - 37 U/L Final    Alk.  phosphatase 02/28/2017 71  45 - 117 U/L Final    Protein, total 02/28/2017 6.6  6.4 - 8.2 g/dL Final    Albumin 02/28/2017 3.9  3.4 - 5.0 g/dL Final    Globulin 02/28/2017 2.7  2.0 - 4.0 g/dL Final    A-G Ratio 02/28/2017 1.4  0.8 - 1.7   Final    TSH 02/28/2017 1.95  0.36 - 3.74 uIU/mL Final    Color 02/28/2017 YELLOW    Final    Appearance 02/28/2017 CLEAR    Final    Specific gravity 02/28/2017 1.020  1.005 - 1.030   Final    pH (UA) 02/28/2017 5.0  5.0 - 8.0   Final    Protein 02/28/2017 NEGATIVE   NEG mg/dL Final    Glucose 02/28/2017 NEGATIVE   NEG mg/dL Final    Ketone 02/28/2017 NEGATIVE   NEG mg/dL Final    Bilirubin 02/28/2017 NEGATIVE   NEG   Final    Blood 02/28/2017 NEGATIVE   NEG   Final    Urobilinogen 02/28/2017 0.2  0.2 - 1.0 EU/dL Final    Nitrites 02/28/2017 NEGATIVE   NEG   Final    Leukocyte Esterase 02/28/2017 SMALL* NEG   Final    Prostate Specific Ag 02/28/2017 1.0  0.0 - 4.0 ng/mL Final    Vitamin D 25-Hydroxy 02/28/2017 28.4* 30 - 100 ng/mL Final    WBC 02/28/2017 0 to 2  0 - 4 /hpf Final    RBC 02/28/2017 0  0 - 5 /hpf Final    Epithelial cells 02/28/2017 FEW  0 - 5 /lpf Final    Bacteria 02/28/2017 NEGATIVE   NEG /hpf Final       Health Maintenance:  Screening:    Colorectal: colonoscopy (3/2016) tubular adenomas. Dr. Rodgers [place]. Due 2021. Depression: none   DM (HbA1c/FPG): FPG 96 (2/2017)   Hepatitis C: N/A   Falls: none   DEXA: N/A   PSA/SHANTLEL: PSA 1.0/ SHANTELL (3/2017)   Glaucoma: regular eye exams with Dr. Tanya Contreras (last 3/2017)   Smoking: distant past   Vitamin D: 28.4 (2/2017)   Medicare Wellness: today    Impression:  Patient Active Problem List   Diagnosis Code    Hyperlipidemia E78.5    Diverticulitis, Recurrent K57.92    Colon polyps K63.5    Onychomycosis B35.1    Abnormal glucose R73.09    Vitamin D insufficiency E55.9    Advance directive discussed with patient Z70.80    Diverticulosis of large intestine without hemorrhage K57.30    Prehypertension R03.0       Plan:  1. Prehypertension. Well controlled without medication. Renal function normal with creatinine 0.91 / eGFR >60. Continue to follow. 2. Hyperlipidemia. On moderate intensity dose atorvastatin with [id number], indicative of only fair control. Will increase dose of atorvastatin to 20 mg and recheck lipid panel at next visit.  Emphasized importance of lifestyle modifications, including diet, exercise, and weight loss. 3. Abnormal glucose. Elevation resolved with dietary changes. Follow. 4. Diverticulosis. Remains asymptomatic, without episode of diverticulitis in over 5 years. Continue high fiber diet and follow. 5. Health maintenance. Already received influenza vaccine. Given script for Tdap. Other immunizations up to date. Colonoscopy due 2021. Prostate cancer screening up to date. Patient wishing to continue. Vitamin D level low. Patient will restart supplement. Continue regular eye exams with Dr. Tito Syed. In addition, an annual Medicare wellness visit was done today. Reviewed and agree with assessment. Total time: 25 minutes spent with the patient in face-to-face consultation, of which greater than 50% was spent on counseling, answering questions, and/or coordination of care as described above. Patient understands recommendations and agrees with plan. Follow-up in 12 months.

## 2017-04-20 ENCOUNTER — PATIENT OUTREACH (OUTPATIENT)
Dept: INTERNAL MEDICINE CLINIC | Age: 77
End: 2017-04-20

## 2018-02-20 RX ORDER — ATORVASTATIN CALCIUM 20 MG/1
TABLET, FILM COATED ORAL
Qty: 90 TAB | Refills: 2 | Status: SHIPPED | OUTPATIENT
Start: 2018-02-20 | End: 2019-01-01 | Stop reason: SDUPTHER

## 2018-03-20 ENCOUNTER — HOSPITAL ENCOUNTER (OUTPATIENT)
Dept: LAB | Age: 78
Discharge: HOME | End: 2018-03-20
Payer: MEDICARE

## 2018-03-20 ENCOUNTER — APPOINTMENT (OUTPATIENT)
Dept: INTERNAL MEDICINE CLINIC | Age: 78
End: 2018-03-20

## 2018-03-20 DIAGNOSIS — R03.0 PREHYPERTENSION: ICD-10-CM

## 2018-03-20 DIAGNOSIS — Z12.5 SCREENING PSA (PROSTATE SPECIFIC ANTIGEN): ICD-10-CM

## 2018-03-20 DIAGNOSIS — E55.9 VITAMIN D INSUFFICIENCY: ICD-10-CM

## 2018-03-20 DIAGNOSIS — E78.5 HYPERLIPIDEMIA, UNSPECIFIED HYPERLIPIDEMIA TYPE: ICD-10-CM

## 2018-03-20 DIAGNOSIS — R73.09 ABNORMAL GLUCOSE: ICD-10-CM

## 2018-03-20 LAB
ALBUMIN SERPL-MCNC: 3.8 G/DL (ref 3.4–5)
ALBUMIN/GLOB SERPL: 1.4 {RATIO} (ref 0.8–1.7)
ALP SERPL-CCNC: 76 U/L (ref 45–117)
ALT SERPL-CCNC: 25 U/L (ref 16–61)
ANION GAP SERPL CALC-SCNC: 7 MMOL/L (ref 3–18)
APPEARANCE UR: CLEAR
AST SERPL-CCNC: 17 U/L (ref 15–37)
BACTERIA URNS QL MICRO: NEGATIVE /HPF
BASOPHILS # BLD: 0 K/UL (ref 0–0.06)
BASOPHILS NFR BLD: 1 % (ref 0–2)
BILIRUB SERPL-MCNC: 1 MG/DL (ref 0.2–1)
BILIRUB UR QL: NEGATIVE
BUN SERPL-MCNC: 11 MG/DL (ref 7–18)
BUN/CREAT SERPL: 11 (ref 12–20)
CALCIUM SERPL-MCNC: 8.8 MG/DL (ref 8.5–10.1)
CHLORIDE SERPL-SCNC: 109 MMOL/L (ref 100–108)
CHOLEST SERPL-MCNC: 145 MG/DL
CO2 SERPL-SCNC: 27 MMOL/L (ref 21–32)
COLOR UR: YELLOW
CREAT SERPL-MCNC: 1 MG/DL (ref 0.6–1.3)
DIFFERENTIAL METHOD BLD: ABNORMAL
EOSINOPHIL # BLD: 0.1 K/UL (ref 0–0.4)
EOSINOPHIL NFR BLD: 2 % (ref 0–5)
EPITH CASTS URNS QL MICRO: ABNORMAL /LPF (ref 0–5)
ERYTHROCYTE [DISTWIDTH] IN BLOOD BY AUTOMATED COUNT: 13.9 % (ref 11.6–14.5)
EST. AVERAGE GLUCOSE BLD GHB EST-MCNC: 117 MG/DL
GLOBULIN SER CALC-MCNC: 2.7 G/DL (ref 2–4)
GLUCOSE SERPL-MCNC: 102 MG/DL (ref 74–99)
GLUCOSE UR STRIP.AUTO-MCNC: NEGATIVE MG/DL
HBA1C MFR BLD: 5.7 % (ref 4.2–5.6)
HCT VFR BLD AUTO: 45.5 % (ref 36–48)
HDLC SERPL-MCNC: 40 MG/DL (ref 40–60)
HDLC SERPL: 3.6 {RATIO} (ref 0–5)
HGB BLD-MCNC: 15.5 G/DL (ref 13–16)
HGB UR QL STRIP: NEGATIVE
KETONES UR QL STRIP.AUTO: NEGATIVE MG/DL
LDLC SERPL CALC-MCNC: 83.8 MG/DL (ref 0–100)
LEUKOCYTE ESTERASE UR QL STRIP.AUTO: ABNORMAL
LIPID PROFILE,FLP: NORMAL
LYMPHOCYTES # BLD: 1 K/UL (ref 0.9–3.6)
LYMPHOCYTES NFR BLD: 18 % (ref 21–52)
MCH RBC QN AUTO: 31.5 PG (ref 24–34)
MCHC RBC AUTO-ENTMCNC: 34.1 G/DL (ref 31–37)
MCV RBC AUTO: 92.5 FL (ref 74–97)
MONOCYTES # BLD: 0.4 K/UL (ref 0.05–1.2)
MONOCYTES NFR BLD: 7 % (ref 3–10)
NEUTS SEG # BLD: 3.8 K/UL (ref 1.8–8)
NEUTS SEG NFR BLD: 72 % (ref 40–73)
NITRITE UR QL STRIP.AUTO: NEGATIVE
PH UR STRIP: 5 [PH] (ref 5–8)
PLATELET # BLD AUTO: 184 K/UL (ref 135–420)
PMV BLD AUTO: 9.8 FL (ref 9.2–11.8)
POTASSIUM SERPL-SCNC: 4.3 MMOL/L (ref 3.5–5.5)
PROT SERPL-MCNC: 6.5 G/DL (ref 6.4–8.2)
PROT UR STRIP-MCNC: NEGATIVE MG/DL
PSA SERPL-MCNC: 1.2 NG/ML (ref 0–4)
RBC # BLD AUTO: 4.92 M/UL (ref 4.7–5.5)
RBC #/AREA URNS HPF: 0 /HPF (ref 0–5)
SODIUM SERPL-SCNC: 143 MMOL/L (ref 136–145)
SP GR UR REFRACTOMETRY: 1.02 (ref 1–1.03)
TRIGL SERPL-MCNC: 106 MG/DL (ref ?–150)
TSH SERPL DL<=0.05 MIU/L-ACNC: 1.97 UIU/ML (ref 0.36–3.74)
UROBILINOGEN UR QL STRIP.AUTO: 0.2 EU/DL (ref 0.2–1)
VLDLC SERPL CALC-MCNC: 21.2 MG/DL
WBC # BLD AUTO: 5.3 K/UL (ref 4.6–13.2)
WBC URNS QL MICRO: ABNORMAL /HPF (ref 0–4)

## 2018-03-20 PROCEDURE — 84153 ASSAY OF PSA TOTAL: CPT | Performed by: INTERNAL MEDICINE

## 2018-03-20 PROCEDURE — 80061 LIPID PANEL: CPT | Performed by: INTERNAL MEDICINE

## 2018-03-20 PROCEDURE — 83036 HEMOGLOBIN GLYCOSYLATED A1C: CPT | Performed by: INTERNAL MEDICINE

## 2018-03-20 PROCEDURE — 80053 COMPREHEN METABOLIC PANEL: CPT | Performed by: INTERNAL MEDICINE

## 2018-03-20 PROCEDURE — 85025 COMPLETE CBC W/AUTO DIFF WBC: CPT | Performed by: INTERNAL MEDICINE

## 2018-03-20 PROCEDURE — 81001 URINALYSIS AUTO W/SCOPE: CPT | Performed by: INTERNAL MEDICINE

## 2018-03-20 PROCEDURE — 36415 COLL VENOUS BLD VENIPUNCTURE: CPT | Performed by: INTERNAL MEDICINE

## 2018-03-20 PROCEDURE — 82306 VITAMIN D 25 HYDROXY: CPT | Performed by: INTERNAL MEDICINE

## 2018-03-20 PROCEDURE — 84443 ASSAY THYROID STIM HORMONE: CPT | Performed by: INTERNAL MEDICINE

## 2018-03-21 LAB — 25(OH)D3 SERPL-MCNC: 28.2 NG/ML (ref 30–100)

## 2018-03-27 ENCOUNTER — OFFICE VISIT (OUTPATIENT)
Dept: INTERNAL MEDICINE CLINIC | Age: 78
End: 2018-03-27

## 2018-03-27 ENCOUNTER — TELEPHONE (OUTPATIENT)
Dept: INTERNAL MEDICINE CLINIC | Age: 78
End: 2018-03-27

## 2018-03-27 VITALS
TEMPERATURE: 97.5 F | SYSTOLIC BLOOD PRESSURE: 124 MMHG | DIASTOLIC BLOOD PRESSURE: 80 MMHG | HEIGHT: 66 IN | OXYGEN SATURATION: 99 % | RESPIRATION RATE: 16 BRPM | BODY MASS INDEX: 27.64 KG/M2 | WEIGHT: 172 LBS | HEART RATE: 52 BPM

## 2018-03-27 DIAGNOSIS — E78.5 HYPERLIPIDEMIA, UNSPECIFIED HYPERLIPIDEMIA TYPE: ICD-10-CM

## 2018-03-27 DIAGNOSIS — K57.92 DIVERTICULITIS: ICD-10-CM

## 2018-03-27 DIAGNOSIS — Z71.89 ACP (ADVANCE CARE PLANNING): ICD-10-CM

## 2018-03-27 DIAGNOSIS — Z00.00 MEDICARE ANNUAL WELLNESS VISIT, SUBSEQUENT: ICD-10-CM

## 2018-03-27 DIAGNOSIS — E55.9 VITAMIN D INSUFFICIENCY: ICD-10-CM

## 2018-03-27 DIAGNOSIS — Z12.5 SCREENING PSA (PROSTATE SPECIFIC ANTIGEN): ICD-10-CM

## 2018-03-27 DIAGNOSIS — R03.0 PREHYPERTENSION: Primary | ICD-10-CM

## 2018-03-27 DIAGNOSIS — R73.09 ABNORMAL GLUCOSE: ICD-10-CM

## 2018-03-27 DIAGNOSIS — K57.30 DIVERTICULOSIS OF LARGE INTESTINE WITHOUT HEMORRHAGE: ICD-10-CM

## 2018-03-27 NOTE — ACP (ADVANCE CARE PLANNING)
Advance Care Planning (ACP) Provider Note - Comprehensive     Date of ACP Conversation: 03/27/18  Persons included in Conversation:  patient  Length of ACP Conversation in minutes:  16 minutes    Authorized Decision Maker (if patient is incapable of making informed decisions): wife (primary) and daughter (secondary)  This person is:  Healthcare Agent/Medical Power of  under Advance Directive          General ACP for ALL Patients with Decision Making Capacity:   Importance of advance care planning, including choosing a healthcare agent to communicate patient's healthcare decisions if patient lost the ability to make decisions, such as after a sudden illness or accident  Understanding of the healthcare agent role was assessed and information provided  Exploration of values, goals, and preferences if recovery is not expected, even with continued medical treatment in the event of: Imminent death  Severe, permanent brain injury  \"In these circumstances, what matters most to you? \"  Care focused more on comfort or quality of life. Review of Existing Advance Directive:  Patient presents today with a completed advance directive form. He designates his wife and daughter as his healthcare agents and expresses that he does not wish life prolonging procedures for end of life care. It was reveiwed with him and witnessed and signed. A copy was made to scan into the chart.     For Serious or Chronic Illness:  Understanding of medical condition      Interventions Provided:  Reviewed existing Advance Directive   Recommended review of completed ACP document annually or upon change in health status

## 2018-03-27 NOTE — MR AVS SNAPSHOT
[facility] 
 
 
 [address] 
[phone] Patient: Byron Malagon MRN: [id number] Visit Information Date & Time Provider Department Dept. Phone Encounter #  
 3/27/2018 10:30 AM Sandro Finch MD [facility] [id number] Follow-up Instructions Return in about 1 year (around 3/27/2019), or if symptoms worsen or fail to improve. Upcoming Health Maintenance Date Due  
 GLAUCOMA SCREENING Q2Y 3/16/2019 MEDICARE YEARLY EXAM 3/28/2019 COLONOSCOPY 3/4/2021 DTaP/Tdap/Td series (2 - Td) 11/7/2027 Allergies as of 3/27/2018  Review Complete On: 3/27/2018 By: Luis Pretty No Known Allergies Current Immunizations  Reviewed on 12/9/2015 Name Date Influenza High Dose Vaccine PF 11/2/2016, 11/1/2013 Influenza Vaccine 10/11/2015, 10/31/2014 Influenza Vaccine Split 10/23/2012, 10/17/2011 Influenza Vaccine Whole 10/12/2010 Pneumococcal Conjugate (PCV-13) 1/5/2015 TD Vaccine 1/1/2009 ZZZ-RETIRED (DO NOT USE) Pneumococcal Vaccine (Unspecified Type) 1/1/2005 Zoster 1/1/2009 Not reviewed this visit Vitals BP Pulse Temp Resp Height(growth percentile) Weight(growth percentile) 124/80 (BP 1 Location: Right arm, BP Patient Position: Sitting) (!) 52 97.5 °F (36.4 °C) (Oral) 16 5' 6\" (1.676 m) 172 lb (78 kg) SpO2 BMI Smoking Status 99% 27.76 kg/m2 Former Smoker Vitals History BMI and BSA Data Body Mass Index Body Surface Area  
 27.76 kg/m 2 1.91 m 2 Preferred Pharmacy Pharmacy Name Phone [address] [facility] [phone] Your Updated Medication List  
  
   
This list is accurate as of 3/27/18 11:20 AM.  Always use your most recent med list. ADVIL 200 mg tablet Generic drug:  ibuprofen Take  by mouth every six (6) hours as needed. atorvastatin 20 mg tablet Commonly known as:  LIPITOR  
TAKE 1 TABLET DAILY  
  
 OSTEO BI-FLEX TRIPLE STRENGTH PO Take  by mouth. VITAMIN D3 2,000 unit Tab Generic drug:  cholecalciferol (vitamin D3) Take 2,000 Units by mouth daily. Follow-up Instructions Return in about 1 year (around 3/27/2019), or if symptoms worsen or fail to improve. Patient Instructions Medicare Wellness Visit, Male The best way to improve and maintain good health is to have a healthy lifestyle by eating a well-balanced diet, exercising regularly, limiting alcohol and stopping smoking. Regular visits with your physician or non-physician health care provider also support your good health. Preventive screening tests can find health problems before they become diseases or illnesses. Preventive services such as immunizations prevent serious infections. All people over age 72 should have a Pneumovax and a Prevnar-13 shot to prevent potentially life threatening infections with the pneumococcus bacteria, a common cause of pneumonia. These are once in a lifetime unless you and your provider decide differently. All people over 65 should have a yearly influenza vaccine or \"flu\" shot. This does not prevent infection with cold viruses but has been proven to prevent hospitalization and death from influenza. Although Medicare part B \"regular Medicare\" currently only covers tetanus vaccination in the context of an injury, a tetanus vaccine (Tdap or Td) is recommended every 10 years. A shingles vaccine is recommended once in a lifetime after age 61. The Shingles vaccine is also not covered by Medicare part B. Note, however, that both the Shingles vaccine and Tdap/Td are generally covered by secondary carriers. Please check your coverage and out of pocket expenses.  Consider contacting your local health department because it may stock these vaccines for a reasonable charge. We currently have documentation of the following immunization history for you: 
Immunization History Administered Date(s) Administered  Influenza High Dose Vaccine PF 11/01/2013, 11/02/2016  Influenza Vaccine 10/31/2014, 10/11/2015  Influenza Vaccine Split 10/17/2011, 10/23/2012  Influenza Vaccine Whole 10/12/2010  Pneumococcal Conjugate (PCV-13) 01/05/2015  TD Vaccine 01/01/2009  ZZZ-RETIRED (DO NOT USE) Pneumococcal Vaccine (Unspecified Type) 01/01/2005  Zoster 01/01/2009 Screening for infection with Hepatitis C is recommended for anyone born between [birth year] through [address]. The table at the bottom of this document indicates the status of this and other screening services. Screening for diabetes mellitus with a blood sugar test (glucose) should be done at least every 3 years until age 79. You and your health care provider may decide whether to continue screening after age 79. The most recent blood glucose we have on file for you is:  
Lab Results Component Value Date/Time Glucose 102 (H) 03/20/2018 09:20 AM  
 
 
Glaucoma is a disease of the eye due to increased ocular pressure that can lead to blindness. People with risk factors for glaucoma ( race, diabetes, family history) should be screened at least every 2 years by an eye professional. This may be covered annually if indicated as determined by you and your doctor. Cardiovascular screening tests that check for elevated lipids or cholesterol (fatty part of blood) which can lead to heart disease and strokes should be done every 4-6 years through age 79. You and your health care provider may decide whether to continue screening after age 79. The most recent lipid panel we have on file for you is:  
Lab Results Component Value Date/Time  Cholesterol, total 145 03/20/2018 09:20 AM  
 HDL Cholesterol 40 03/20/2018 09:20 AM  
 LDL, calculated 83.8 03/20/2018 09:20 AM  
 VLDL, calculated 21.2 03/20/2018 09:20 AM  
 Triglyceride 106 03/20/2018 09:20 AM  
 CHOL/HDL Ratio 3.6 03/20/2018 09:20 AM  
 
 
Colorectal cancer screening that evaluates for blood or polyps in your colon for people with average risk should be done yearly as a stool test, every five years as a flexible sigmoidoscope or every 10 years as a colonoscopy up to age 76. You and your health care provider may decide whether to continue screening after age 76. Men up to age 76 may elect to screen for prostate cancer with a blood test called a PSA at certain intervals, depending on their personal and family history. This decision is between the patient and his provider. The most recent PSA values we have on file for you are: 
Lab Results Component Value Date/Time  
 Prostate Specific Ag 1.2 03/20/2018 09:20 AM  
 Prostate Specific Ag 1.0 02/28/2017 09:03 AM  
 Prostate Specific Ag 0.9 12/02/2015 03:00 PM  
 Prostate Specific Ag 1.5 11/24/2014 08:18 AM  
 Prostate Specific Ag 1.8 10/23/2013 07:55 AM  
 Prostate Specific Ag 2.4 10/15/2012 03:20 PM  
 
 
If you have been a smoker or had family history of abdominal aortic aneurysms, you and your provider may decide to schedule an ultrasound test of your aorta. Our records show this was done on:  N/A People who have smoked the equivalent of 1 pack per day for 30 years or more may benefit from screening for lung cancer with a yearly low dose CT scan until they have been non smokers for 15 years or competing health conditions render this unlikely to be beneficial. Our records show: N/A Your Medicare Wellness Exam is recommended annually. Here is a list of your current Health Maintenance items with a due date: 
Health Maintenance Topic Date Due  GLAUCOMA SCREENING Q2Y  03/16/2019  MEDICARE YEARLY EXAM  03/28/2019  COLONOSCOPY  03/04/2021  
 DTaP/Tdap/Td series (2 - Td) 11/07/2027  ZOSTER VACCINE AGE 60>  Completed  Pneumococcal 65+ Low/Medium Risk  Addressed  Influenza Age 5 to Adult  Completed Prediabetes: Care Instructions Your Care Instructions Prediabetes is a warning sign that you are at risk for getting type 2 diabetes. It means that your blood sugar is higher than it should be. The food you eat turns into sugar, which your body uses for energy. Normally, an organ called the pancreas makes insulin, which allows the sugar in your blood to get into your body's cells. But when your body can't use insulin the right way, the sugar doesn't move into cells. It stays in your blood instead. This is called insulin resistance. The buildup of sugar in the blood causes prediabetes. The good news is that lifestyle changes may help you get your blood sugar back to normal and help you avoid or delay diabetes. Follow-up care is a key part of your treatment and safety. Be sure to make and go to all appointments, and call your doctor if you are having problems. It's also a good idea to know your test results and keep a list of the medicines you take. How can you care for yourself at home? · Watch your weight. A healthy weight helps your body use insulin properly. · Limit the amount of calories, sweets, and unhealthy fat you eat. Ask your doctor if you should see a dietitian. A registered dietitian can help you create meal plans that fit your lifestyle. · Get at least 30 minutes of exercise on most days of the week. Exercise helps control your blood sugar. It also helps you maintain a healthy weight. Walking is a good choice. You also may want to do other activities, such as running, swimming, cycling, or playing tennis or team sports. · Do not smoke. Smoking can make prediabetes worse. If you need help quitting, talk to your doctor about stop-smoking programs and medicines. These can increase your chances of quitting for good. · If your doctor prescribed medicines, take them exactly as prescribed. Call your doctor if you think you are having a problem with your medicine. You will get more details on the specific medicines your doctor prescribes. When should you call for help? Watch closely for changes in your health, and be sure to contact your doctor if: 
? · You have any symptoms of diabetes. These may include: ¨ Being thirsty more often. ¨ Urinating more. ¨ Being hungrier. ¨ Losing weight. ¨ Being very tired. ¨ Having blurry vision. ? · You have a wound that will not heal.  
? · You have an infection that will not go away. ? · You have problems with your blood pressure. ? · You want more information about diabetes and how you can keep from getting it. Where can you learn more? Go to http://ann marie-alexander.info/. Enter I222 in the search box to learn more about \"Prediabetes: Care Instructions. \" Current as of: March 13, 2017 Content Version: 11.4 © 7462-8614 [employer]. Care instructions adapted under license by [employer] (which disclaims liability or warranty for this information). If you have questions about a medical condition or this instruction, always ask your healthcare professional. [address] any warranty or liability for your use of this information. Body Mass Index: Care Instructions Your Care Instructions Body mass index (BMI) can help you see if your weight is raising your risk for health problems. It uses a formula to compare how much you weigh with how tall you are. · A BMI lower than 18.5 is considered underweight. · A BMI between 18.5 and 24.9 is considered healthy. · A BMI between 25 and 29.9 is considered overweight. A BMI of 30 or higher is considered obese. If your BMI is in the normal range, it means that you have a lower risk for weight-related health problems.  If your BMI is in the overweight or obese range, you may be at increased risk for weight-related health problems, such as high blood pressure, heart disease, stroke, arthritis or joint pain, and diabetes. If your BMI is in the underweight range, you may be at increased risk for health problems such as fatigue, lower protection (immunity) against illness, muscle loss, bone loss, hair loss, and hormone problems. BMI is just one measure of your risk for weight-related health problems. You may be at higher risk for health problems if you are not active, you eat an unhealthy diet, or you drink too much alcohol or use tobacco products. Follow-up care is a key part of your treatment and safety. Be sure to make and go to all appointments, and call your doctor if you are having problems. It's also a good idea to know your test results and keep a list of the medicines you take. How can you care for yourself at home? · Practice healthy eating habits. This includes eating plenty of fruits, vegetables, whole grains, lean protein, and low-fat dairy. · If your doctor recommends it, get more exercise. Walking is a good choice. Bit by bit, increase the amount you walk every day. Try for at least 30 minutes on most days of the week. · Do not smoke. Smoking can increase your risk for health problems. If you need help quitting, talk to your doctor about stop-smoking programs and medicines. These can increase your chances of quitting for good. · Limit alcohol to 2 drinks a day for men and 1 drink a day for women. Too much alcohol can cause health problems. If you have a BMI higher than 25 · Your doctor may do other tests to check your risk for weight-related health problems. This may include measuring the distance around your waist. A waist measurement of more than 40 inches in men or 35 inches in women can increase the risk of weight-related health problems.  
· Talk with your doctor about steps you can take to stay healthy or improve your health. You may need to make lifestyle changes to lose weight and stay healthy, such as changing your diet and getting regular exercise. If you have a BMI lower than 18.5 · Your doctor may do other tests to check your risk for health problems. · Talk with your doctor about steps you can take to stay healthy or improve your health. You may need to make lifestyle changes to gain or maintain weight and stay healthy, such as getting more healthy foods in your diet and doing exercises to build muscle. Where can you learn more? Go to http://ann marie-alexander.info/. Enter S176 in the search box to learn more about \"Body Mass Index: Care Instructions. \" Current as of: October 13, 2016 Content Version: 11.4 © 0482-0820 [employer]. Care instructions adapted under license by [employer] (which disclaims liability or warranty for this information). If you have questions about a medical condition or this instruction, always ask your healthcare professional. [place] [address] any warranty or liability for your use of this information. Introducing [facility]! Dear Angelita Bernstein: 
Thank you for requesting a [employer] account. Our records indicate that you already have an active [employer] Reporter account. You can access your account anytime at [email or web address] Did you know that you can access your hospital and ER discharge instructions at any time in Food Reporter? You can also review all of your test results from your hospital stay or ER visit. Additional Information If you have questions, please visit the Frequently Asked Questions section of the Food Reporter website at [email or web address] Remember, Food Reporter is NOT to be used for urgent needs. For medical emergencies, dial 911. Now available from your iPhone and Android! Please provide this summary of care documentation to your next provider. Your primary care clinician is listed as Marcy Santoro. If you have any questions after today's visit, please call [phone].

## 2018-03-27 NOTE — PATIENT INSTRUCTIONS
Medicare Wellness Visit, Male    The best way to improve and maintain good health is to have a healthy lifestyle by eating a well-balanced diet, exercising regularly, limiting alcohol and stopping smoking. Regular visits with your physician or non-physician health care provider also support your good health. Preventive screening tests can find health problems before they become diseases or illnesses. Preventive services such as immunizations prevent serious infections. All people over age 72 should have a Pneumovax and a Prevnar-13 shot to prevent potentially life threatening infections with the pneumococcus bacteria, a common cause of pneumonia. These are once in a lifetime unless you and your provider decide differently. All people over 65 should have a yearly influenza vaccine or \"flu\" shot. This does not prevent infection with cold viruses but has been proven to prevent hospitalization and death from influenza. Although Medicare part B \"regular Medicare\" currently only covers tetanus vaccination in the context of an injury, a tetanus vaccine (Tdap or Td) is recommended every 10 years. A shingles vaccine is recommended once in a lifetime after age 61. The Shingles vaccine is also not covered by Medicare part B. Note, however, that both the Shingles vaccine and Tdap/Td are generally covered by secondary carriers. Please check your coverage and out of pocket expenses. Consider contacting your local health department because it may stock these vaccines for a reasonable charge.     We currently have documentation of the following immunization history for you:  Immunization History   Administered Date(s) Administered    Influenza High Dose Vaccine PF 11/01/2013, 11/02/2016    Influenza Vaccine 10/31/2014, 10/11/2015    Influenza Vaccine Split 10/17/2011, 10/23/2012    Influenza Vaccine Whole 10/12/2010    Pneumococcal Conjugate (PCV-13) 01/05/2015    TD Vaccine 01/01/2009    ZZZ-RETIRED (DO NOT USE) Pneumococcal Vaccine (Unspecified Type) 01/01/2005    Zoster 01/01/2009       Screening for infection with Hepatitis C is recommended for anyone born between [birth year] through 1965. The table at the bottom of this document indicates the status of this and other screening services. Screening for diabetes mellitus with a blood sugar test (glucose) should be done at least every 3 years until age 79. You and your health care provider may decide whether to continue screening after age 79. The most recent blood glucose we have on file for you is:   Lab Results   Component Value Date/Time    Glucose 102 (H) 03/20/2018 09:20 AM       Glaucoma is a disease of the eye due to increased ocular pressure that can lead to blindness. People with risk factors for glaucoma ( race, diabetes, family history) should be screened at least every 2 years by an eye professional. This may be covered annually if indicated as determined by you and your doctor. Cardiovascular screening tests that check for elevated lipids or cholesterol (fatty part of blood) which can lead to heart disease and strokes should be done every 4-6 years through age 79. You and your health care provider may decide whether to continue screening after age 79. The most recent lipid panel we have on file for you is:   Lab Results   Component Value Date/Time    Cholesterol, total 145 03/20/2018 09:20 AM    HDL Cholesterol 40 03/20/2018 09:20 AM    LDL, calculated 83.8 03/20/2018 09:20 AM    VLDL, calculated 21.2 03/20/2018 09:20 AM    Triglyceride 106 03/20/2018 09:20 AM    CHOL/HDL Ratio 3.6 03/20/2018 09:20 AM       Colorectal cancer screening that evaluates for blood or polyps in your colon for people with average risk should be done yearly as a stool test, every five years as a flexible sigmoidoscope or every 10 years as a colonoscopy up to age 76. You and your health care provider may decide whether to continue screening after age 76.     Men up to age 76 may elect to screen for prostate cancer with a blood test called a PSA at certain intervals, depending on their personal and family history. This decision is between the patient and his provider. The most recent PSA values we have on file for you are:  Lab Results   Component Value Date/Time    Prostate Specific Ag 1.2 03/20/2018 09:20 AM    Prostate Specific Ag 1.0 02/28/2017 09:03 AM    Prostate Specific Ag 0.9 12/02/2015 03:00 PM    Prostate Specific Ag 1.5 11/24/2014 08:18 AM    Prostate Specific Ag 1.8 10/23/2013 07:55 AM    Prostate Specific Ag 2.4 10/15/2012 03:20 PM       If you have been a smoker or had family history of abdominal aortic aneurysms, you and your provider may decide to schedule an ultrasound test of your aorta. Our records show this was done on:  N/A    People who have smoked the equivalent of 1 pack per day for 30 years or more may benefit from screening for lung cancer with a yearly low dose CT scan until they have been non smokers for 15 years or competing health conditions render this unlikely to be beneficial. Our records show: N/A    Your Medicare Wellness Exam is recommended annually. Here is a list of your current Health Maintenance items with a due date:  Health Maintenance   Topic Date Due    GLAUCOMA SCREENING Q2Y  03/16/2019    MEDICARE YEARLY EXAM  03/28/2019    COLONOSCOPY  03/04/2021    DTaP/Tdap/Td series (2 - Td) 11/07/2027    ZOSTER VACCINE AGE 60>  Completed    Pneumococcal 65+ Low/Medium Risk  Addressed    Influenza Age 5 to Adult  Completed          Prediabetes: Care Instructions  Your Care Instructions    Prediabetes is a warning sign that you are at risk for getting type 2 diabetes. It means that your blood sugar is higher than it should be. The food you eat turns into sugar, which your body uses for energy. Normally, an organ called the pancreas makes insulin, which allows the sugar in your blood to get into your body's cells.  But when your body can't use insulin the right way, the sugar doesn't move into cells. It stays in your blood instead. This is called insulin resistance. The buildup of sugar in the blood causes prediabetes. The good news is that lifestyle changes may help you get your blood sugar back to normal and help you avoid or delay diabetes. Follow-up care is a key part of your treatment and safety. Be sure to make and go to all appointments, and call your doctor if you are having problems. It's also a good idea to know your test results and keep a list of the medicines you take. How can you care for yourself at home? · Watch your weight. A healthy weight helps your body use insulin properly. · Limit the amount of calories, sweets, and unhealthy fat you eat. Ask your doctor if you should see a dietitian. A registered dietitian can help you create meal plans that fit your lifestyle. · Get at least 30 minutes of exercise on most days of the week. Exercise helps control your blood sugar. It also helps you maintain a healthy weight. Walking is a good choice. You also may want to do other activities, such as running, swimming, cycling, or playing tennis or team sports. · Do not smoke. Smoking can make prediabetes worse. If you need help quitting, talk to your doctor about stop-smoking programs and medicines. These can increase your chances of quitting for good. · If your doctor prescribed medicines, take them exactly as prescribed. Call your doctor if you think you are having a problem with your medicine. You will get more details on the specific medicines your doctor prescribes. When should you call for help? Watch closely for changes in your health, and be sure to contact your doctor if:  ? · You have any symptoms of diabetes. These may include:  ¨ Being thirsty more often. ¨ Urinating more. ¨ Being hungrier. ¨ Losing weight. ¨ Being very tired. ¨ Having blurry vision.    ? · You have a wound that will not heal.   ? · You have an infection that will not go away. ? · You have problems with your blood pressure. ? · You want more information about diabetes and how you can keep from getting it. Where can you learn more? Go to http://ann marie-alexander.info/. Enter I222 in the search box to learn more about \"Prediabetes: Care Instructions. \"  Current as of: March 13, 2017  Content Version: 11.4  © 9527-8559 [employer]. Care instructions adapted under license by [employer] (which disclaims liability or warranty for this information). If you have questions about a medical condition or this instruction, always ask your healthcare professional. [address] any warranty or liability for your use of this information. Body Mass Index: Care Instructions  Your Care Instructions    Body mass index (BMI) can help you see if your weight is raising your risk for health problems. It uses a formula to compare how much you weigh with how tall you are. · A BMI lower than 18.5 is considered underweight. · A BMI between 18.5 and 24.9 is considered healthy. · A BMI between 25 and 29.9 is considered overweight. A BMI of 30 or higher is considered obese. If your BMI is in the normal range, it means that you have a lower risk for weight-related health problems. If your BMI is in the overweight or obese range, you may be at increased risk for weight-related health problems, such as high blood pressure, heart disease, stroke, arthritis or joint pain, and diabetes. If your BMI is in the underweight range, you may be at increased risk for health problems such as fatigue, lower protection (immunity) against illness, muscle loss, bone loss, hair loss, and hormone problems. BMI is just one measure of your risk for weight-related health problems.  You may be at higher risk for health problems if you are not active, you eat an unhealthy diet, or you drink too much alcohol or use tobacco products. Follow-up care is a key part of your treatment and safety. Be sure to make and go to all appointments, and call your doctor if you are having problems. It's also a good idea to know your test results and keep a list of the medicines you take. How can you care for yourself at home? · Practice healthy eating habits. This includes eating plenty of fruits, vegetables, whole grains, lean protein, and low-fat dairy. · If your doctor recommends it, get more exercise. Walking is a good choice. Bit by bit, increase the amount you walk every day. Try for at least 30 minutes on most days of the week. · Do not smoke. Smoking can increase your risk for health problems. If you need help quitting, talk to your doctor about stop-smoking programs and medicines. These can increase your chances of quitting for good. · Limit alcohol to 2 drinks a day for men and 1 drink a day for women. Too much alcohol can cause health problems. If you have a BMI higher than 25  · Your doctor may do other tests to check your risk for weight-related health problems. This may include measuring the distance around your waist. A waist measurement of more than 40 inches in men or 35 inches in women can increase the risk of weight-related health problems. · Talk with your doctor about steps you can take to stay healthy or improve your health. You may need to make lifestyle changes to lose weight and stay healthy, such as changing your diet and getting regular exercise. If you have a BMI lower than 18.5  · Your doctor may do other tests to check your risk for health problems. · Talk with your doctor about steps you can take to stay healthy or improve your health. You may need to make lifestyle changes to gain or maintain weight and stay healthy, such as getting more healthy foods in your diet and doing exercises to build muscle. Where can you learn more? Go to http://ann marie-alexander.info/.   Enter S176 in the search box to learn more about \"Body Mass Index: Care Instructions. \"  Current as of: October 13, 2016  Content Version: 11.4  © 8140-9625 [employer], [employer]. Care instructions adapted under license by [employer] (which disclaims liability or warranty for this information). If you have questions about a medical condition or this instruction, always ask your healthcare professional. [address] any warranty or liability for your use of this information.

## 2018-03-27 NOTE — PROGRESS NOTES
Chief Complaint   Patient presents with    Cholesterol Problem     Yearly physical with lab results. Health Maintenance Due   Topic Date Due    DTaP/Tdap/Td series (1 - Tdap) 01/02/2009    Influenza Age 5 to Adult  08/01/2017    MEDICARE YEARLY EXAM  03/22/2018     1. Have you been to the ER, urgent care clinic or hospitalized since your last visit? NO.     2. Have you seen or consulted any other health care providers outside of the [address] [place] [address] since your last visit (Include any pap smears or colon screening)? YES, Eye exam in [facility] 2018. Do you have an Advanced Directive?  YES

## 2018-03-27 NOTE — PROGRESS NOTES
This is the Subsequent Medicare Annual Wellness Exam, performed 12 months or more after the Initial AWV or the last Subsequent AWV    I have reviewed the patient's medical history in detail and updated the computerized patient record. History     Past Medical History:   Diagnosis Date    Abnormal glucose     Calculus of kidney     Colon polyps     Diverticulitis     Diverticulosis of colon 03/2016    Colonoscopy (3/2016) advanced left sided diverticulosis    Hyperlipidemia     Prehypertension 3/25/2017      Past Surgical History:   Procedure Laterality Date    ENDOSCOPY, COLON, DIAGNOSTIC      Jan 2011, Dr. Flaquito Sagastume, colon polyps, was going yearly    HX BLEPHAROPLASTY      HX CATARACT REMOVAL  4/2012    both eyes    HX OTHER SURGICAL      cn    MA COLONOSCOPY W/BIOPSY SINGLE/MULTIPLE  3-4-16    Dr. Anabella Kang     Current Outpatient Prescriptions   Medication Sig Dispense Refill    atorvastatin (LIPITOR) 20 mg tablet TAKE 1 TABLET DAILY 90 Tab 2    GLUC/DELANO-MSM#1/C/LXEIS/HARDEEP/BOR (OSTEO BI-FLEX TRIPLE STRENGTH PO) Take  by mouth.  cholecalciferol, vitamin D3, (VITAMIN D3) 2,000 unit tab Take 2,000 Units by mouth daily.  ibuprofen (ADVIL) 200 mg tablet Take  by mouth every six (6) hours as needed.        No Known Allergies  Family History   Problem Relation Age of Onset    Arthritis-osteo Mother     Elevated Lipids Mother     Heart Disease Father      Social History   Substance Use Topics    Smoking status: Former Smoker     Quit date: 12/4/1988    Smokeless tobacco: Never Used    Alcohol use 1.2 oz/week     1 Shots of liquor, 1 Glasses of wine per week      Comment: wine occasionally     Patient Active Problem List   Diagnosis Code    Hyperlipidemia E78.5    Diverticulitis, Recurrent K57.92    Colon polyps K63.5    Onychomycosis B35.1    Abnormal glucose R73.09    Vitamin D insufficiency E55.9    Diverticulosis of large intestine without hemorrhage K57.30    Prehypertension R03.0 Depression Risk Factor Screening:     PHQ over the last two weeks 3/27/2018   Little interest or pleasure in doing things Not at all   Feeling down, depressed or hopeless Not at all   Total Score PHQ 2 0     Alcohol Risk Factor Screening: You do not drink alcohol or very rarely. Functional Ability and Level of Safety:   Hearing Loss  Hearing is good. Activities of Daily Living  The home contains: no safety equipment. Patient does total self care    Fall Risk  Fall Risk Assessment, last 12 mths 3/27/2018   Able to walk? Yes   Fall in past 12 months? No       Abuse Screen  Patient is not abused    Cognitive Screening   Evaluation of Cognitive Function:  Has your family/caregiver stated any concerns about your memory: no  Normal    Patient Care Team   Patient Care Team:  Shayy Carcamo MD as PCP - General (Internal Medicine)  Amari Machuca RN as Ambulatory Care Navigator (Internal Medicine)  Rom Prince MD (Ophthalmology)  Kari Sutherland MD (Plastic Surgery)    Assessment/Plan   Education and counseling provided:  Are appropriate based on today's review and evaluation  End-of-Life planning (with patient's consent)  Influenza Vaccine  Prostate cancer screening tests (PSA, covered annually)  Colorectal cancer screening tests  Cardiovascular screening blood test  Screening for glaucoma  Diabetes screening test  Shingrix vaccine    Diagnoses and all orders for this visit:    1. Prehypertension  -     CBC WITH AUTOMATED DIFF; Future  -     METABOLIC PANEL, COMPREHENSIVE; Future  -     URINALYSIS W/MICROSCOPIC; Future  -     TSH 3RD GENERATION; Future    2. Hyperlipidemia, unspecified hyperlipidemia type  -     LIPID PANEL; Future  -     TSH 3RD GENERATION; Future    3. Abnormal glucose  -     HEMOGLOBIN A1C WITH EAG; Future  -     TSH 3RD GENERATION; Future    4. Diverticulosis of large intestine without hemorrhage    5. Diverticulitis, Recurrent    6.  Vitamin D insufficiency  -     VITAMIN D, 25 HYDROXY; Future    7. Screening PSA (prostate specific antigen)  -     PSA - SCREENING (); Future    8. Medicare annual wellness visit, subsequent    9. ACP (advance care planning)        There are no preventive care reminders to display for this patient.

## 2018-03-30 NOTE — PROGRESS NOTES
HPI:   Cassidy Alonzo is a 68y.o. year old male who presents today for evaluation of prehypertension, hyperlipidemia, abnormal glucose, and diverticulosis. He reports that he is doing well and is currently without complaints. He has a history of pre-hypertension, not requiring treatment with medication. He does not monitor his blood pressure at home. He exercises regularly, going to the gym three times per week doing cardio for 30 minutes and light weights. He denies any chest pain, shortness of breath, lightheadedness, palpitations, edema, PND, or orthopnea. He also has a history of hyperlipidemia, treated with moderate intensity does atorvastatin. He has a history of diverticulosis, and has had multiple episodes of diverticulitis and the past. He reports that his last episode was approximately 5 years ago. He had a screening colonoscopy in 3/2016 by Dr. Ulises Hickey which showed advanced left-sided diverticulosis, and polyps in the cecum and ascending colon (pathology: tubular adenomas). Follow up recommended for five years. He denies any abdominal pain, nausea, vomiting, melena, hematochezia, or change in bowel habits. He has a history of abnormal glucose, with fasting blood sugar ranging from 102-106 from 3176-3888 with a HbA1c of 5.8 at that time. Since 2014, however, his blood sugar and HbA1c have been in the normal range. He reports that this has improved since he is no longer drinking soda and has been watching his diet and weight. He has a regular eye exams with Dr. Anh Armendariz. He denies any polyuria, polydipsia, nocturia, or blurry vision, and has no history of retinopathy, neuropathy, or nephropathy.       Past Medical History:   Diagnosis Date    Abnormal glucose     Calculus of kidney     Colon polyps     Diverticulitis     Diverticulosis of colon 03/2016    Colonoscopy (3/2016) advanced left sided diverticulosis    Hyperlipidemia     Prehypertension 3/25/2017     Past Surgical History: Procedure Laterality Date    ENDOSCOPY, COLON, DIAGNOSTIC      2011, Dr. Bibiana Rivas, colon polyps, was going yearly    HX BLEPHAROPLASTY      HX CATARACT REMOVAL  2012    both eyes    HX OTHER SURGICAL      cn    [place] COLONOSCOPY W/BIOPSY SINGLE/MULTIPLE  3--16    Dr. Colette Castillo     Current Outpatient Prescriptions   Medication Sig    atorvastatin (LIPITOR) 20 mg tablet TAKE 1 TABLET DAILY    GLUC/DELANO-MSM#1/C/LEXIS/HARDEEP/BOR (OSTEO BI-FLEX TRIPLE STRENGTH PO) Take  by mouth.  cholecalciferol, vitamin D3, (VITAMIN D3) 2,000 unit tab Take 2,000 Units by mouth daily.  ibuprofen (ADVIL) 200 mg tablet Take  by mouth every six (6) hours as needed. No current facility-administered medications for this visit. Allergies and Intolerances:   No Known Allergies     Family History: His father  had CABG and  from an MI. His mother  at the age of 80. He has no family history of colon or prostate cancer. Family History   Problem Relation Age of Onset    Arthritis-osteo Mother     Elevated Lipids Mother     Heart Disease Father         Social History:   He  reports that he quit smoking about 29 years ago. He has never used smokeless tobacco. He smoked 1 ppd for 15 years, stopping in . He is  and has one daughter, Rico Abdul. He is a retired Responsa  and is self employed as an .    History   Alcohol Use    1.2 oz/week    1 Shots of liquor, 1 Glasses of wine per week     Comment: wine occasionally     Immunization History:  Immunization History   Administered Date(s) Administered    Influenza High Dose Vaccine PF 2013, 2016, 2017    Influenza Vaccine 10/31/2014, 10/11/2015    Influenza Vaccine Split 10/17/2011, 10/23/2012    Influenza Vaccine Whole 10/12/2010    Pneumococcal Conjugate (PCV-13) 2015    TD Vaccine 2009    Tdap 2017    ZZZ-RETIRED (DO NOT USE) Pneumococcal Vaccine (Unspecified Type) 2005    Zoster 01/01/2009       Review of Systems:   As above included in HPI. Otherwise 11 point review of systems negative including constitutional, skin, HENT, eyes, respiratory, cardiovascular, gastrointestinal, genitourinary, musculoskeletal, endocrine, hematologic, allergy, and neurologic. Physical:   Vitals:   BP: 124/80  HR: (!) 52  WT: 172 lb (78 kg)  BMI:  27.76 kg/m2    Exam:   Patient appears in no apparent distress. Affect is appropriate. HEENT --Anicteric sclerae, tympanic membranes normal,  ear canals normal.  PERRL, EOMI, conjunctiva and lids normal.   Sinuses were nontender, turbinates normal, hearing normal.  Oropharynx without  erythema, normal tongue, oral mucosa and tonsils. No cervical lymphadenopathy. No thyromegaly, JVD, or bruits. Carotid pulses 2+ with normal upstroke. Lungs --Clear to auscultation. No wheezing or rales. Heart --Regular rate and rhythm, no murmurs, rubs, gallops, or clicks. Chest wall --Nontender to palpation. PMI normal.  Abdomen -- Soft and nontender, no hepatosplenomegaly or masses. Extremities -- Without cyanosis, clubbing, edema. 2+ pulses equally and bilaterally.   Normal looking digits, ROM intact  Neuro -- CN 2-12 intact, strength 5/5 with intact soft touch in all extremities, cerebellar  exam finger to nose test normal, 2+DTRs  Derm  no obvious abnormalities noted, no rash    Review of Data:  Labs:  Hospital Outpatient Visit on 03/20/2018   Component Date Value Ref Range Status    WBC 03/20/2018 5.3  4.6 - 13.2 K/uL Final    RBC 03/20/2018 4.92  4.70 - 5.50 M/uL Final    HGB 03/20/2018 15.5  13.0 - 16.0 g/dL Final    HCT 03/20/2018 45.5  36.0 - 48.0 % Final    MCV 03/20/2018 92.5  74.0 - 97.0 FL Final    MCH 03/20/2018 31.5  24.0 - 34.0 PG Final    MCHC 03/20/2018 34.1  31.0 - 37.0 g/dL Final    RDW 03/20/2018 13.9  11.6 - 14.5 % Final    PLATELET 95/24/7306 964  135 - 420 K/uL Final    MPV 03/20/2018 9.8  9.2 - 11.8 FL Final    NEUTROPHILS 03/20/2018 72 40 - 73 % Final    LYMPHOCYTES 03/20/2018 18* 21 - 52 % Final    MONOCYTES 03/20/2018 7  3 - 10 % Final    EOSINOPHILS 03/20/2018 2  0 - 5 % Final    BASOPHILS 03/20/2018 1  0 - 2 % Final    ABS. NEUTROPHILS 03/20/2018 3.8  1.8 - 8.0 K/UL Final    ABS. LYMPHOCYTES 03/20/2018 1.0  0.9 - 3.6 K/UL Final    ABS. MONOCYTES 03/20/2018 0.4  0.05 - 1.2 K/UL Final    ABS. EOSINOPHILS 03/20/2018 0.1  0.0 - 0.4 K/UL Final    ABS. BASOPHILS 03/20/2018 0.0  0.0 - 0.06 K/UL Final    DF 03/20/2018 AUTOMATED    Final    Hemoglobin A1c 03/20/2018 5.7* 4.2 - 5.6 % Final    Est. average glucose 03/20/2018 117  mg/dL Final    Sodium 03/20/2018 143  136 - 145 mmol/L Final    Potassium 03/20/2018 4.3  3.5 - 5.5 mmol/L Final    Chloride 03/20/2018 109* 100 - 108 mmol/L Final    CO2 03/20/2018 27  21 - 32 mmol/L Final    Anion gap 03/20/2018 7  3.0 - 18 mmol/L Final    Glucose 03/20/2018 102* 74 - 99 mg/dL Final    BUN 03/20/2018 11  7.0 - 18 MG/DL Final    Creatinine 03/20/2018 1.00  0.6 - 1.3 MG/DL Final    BUN/Creatinine ratio 03/20/2018 11* 12 - 20   Final    GFR est AA 03/20/2018 >60  >60 ml/min/1.73m2 Final    GFR est non-AA 03/20/2018 >60  >60 ml/min/1.73m2 Final    Calcium 03/20/2018 8.8  8.5 - 10.1 MG/DL Final    Bilirubin, total 03/20/2018 1.0  0.2 - 1.0 MG/DL Final    ALT (SGPT) 03/20/2018 25  16 - 61 U/L Final    AST (SGOT) 03/20/2018 17  15 - 37 U/L Final    Alk.  phosphatase 03/20/2018 76  45 - 117 U/L Final    Protein, total 03/20/2018 6.5  6.4 - 8.2 g/dL Final    Albumin 03/20/2018 3.8  3.4 - 5.0 g/dL Final    Globulin 03/20/2018 2.7  2.0 - 4.0 g/dL Final    A-G Ratio 03/20/2018 1.4  0.8 - 1.7   Final    LIPID PROFILE 03/20/2018        Final    Cholesterol, total 03/20/2018 145  <200 MG/DL Final    Triglyceride 03/20/2018 106  <150 MG/DL Final    HDL Cholesterol 03/20/2018 40  40 - 60 MG/DL Final    LDL, calculated 03/20/2018 83.8  0 - 100 MG/DL Final    VLDL, calculated 03/20/2018 21.2 MG/DL Final    CHOL/HDL Ratio 03/20/2018 3.6  0 - 5.0   Final    Color 03/20/2018 YELLOW    Final    Appearance 03/20/2018 CLEAR    Final    Specific gravity 03/20/2018 1.022  1.005 - 1.030   Final    pH (UA) 03/20/2018 5.0  5.0 - 8.0   Final    Protein 03/20/2018 NEGATIVE   NEG mg/dL Final    Glucose 03/20/2018 NEGATIVE   NEG mg/dL Final    Ketone 03/20/2018 NEGATIVE   NEG mg/dL Final    Bilirubin 03/20/2018 NEGATIVE   NEG   Final    Blood 03/20/2018 NEGATIVE   NEG   Final    Urobilinogen 03/20/2018 0.2  0.2 - 1.0 EU/dL Final    Nitrites 03/20/2018 NEGATIVE   NEG   Final    Leukocyte Esterase 03/20/2018 TRACE* NEG   Final    WBC 03/20/2018 0 to 2  0 - 4 /hpf Final    RBC 03/20/2018 0  0 - 5 /hpf Final    Epithelial cells 03/20/2018 FEW  0 - 5 /lpf Final    Bacteria 03/20/2018 NEGATIVE   NEG /hpf Final    TSH 03/20/2018 1.97  0.36 - 3.74 uIU/mL Final    Prostate Specific Ag 03/20/2018 1.2  0.0 - 4.0 ng/mL Final    Vitamin D 25-Hydroxy 03/20/2018 28.2* 30 - 100 ng/mL Final       Health Maintenance:  Screening:    Colorectal: colonoscopy (3/2016) tubular adenomas. Dr. Joan Capone. Due 2021. Depression: none   DM (HbA1c/FPG): / HbA1c 5.7 (3/2018)   Hepatitis C: N/A   Falls: none   DEXA: N/A   PSA/SHANTELL: PSA 1.2 (3/2018)   Glaucoma: regular eye exams with Dr. Ashkan Lopez (last 3/2018)   Smoking: distant past   Vitamin D: 28.2 (3/2018)   Medicare Wellness: today    Impression:  Patient Active Problem List   Diagnosis Code    Hyperlipidemia E78.5    Diverticulitis, Recurrent K57.92    Colon polyps K63.5    Onychomycosis B35.1    Abnormal glucose R73.09    Vitamin D insufficiency E55.9    Advance directive discussed with patient Z70.80    Diverticulosis of large intestine without hemorrhage K57.30    Prehypertension R03.0       Plan:  1. Prehypertension. Well controlled without medication. Renal function remains normal with creatinine 1.00 / eGFR >60. Continue to follow. 2. Hyperlipidemia. On moderate intensity dose atorvastatin (dose increased last visit to 20 mg daily) with LDL improved to 83.8, indicative of good control. Emphasized importance of continued lifestyle modifications, including diet, exercise, and weight loss. 3. Abnormal glucose. Fasting glucose and HbA1c mildly elevated today. Discussed importance of lifestyle modifications, including diet, exercise, and weight loss. Discussed BMI and healthy weight goal for him. Continue to follow. 4. Diverticulosis. Remains asymptomatic, without episode of diverticulitis in over 5 years. Continue high fiber diet and follow. 5. Health maintenance. Already received influenza vaccine. Also received Tdap. Discussed Shingrix and patient wishing to obtain. Script provided. Other immunizations up to date. Colonoscopy due 2021. Prostate cancer screening up to date. Patient wishing to continue following PSA. Vitamin D level remains mildly low. Patient will continue supplement. Continue regular eye exams with Dr. Camryn Gao. In addition, an annual Medicare wellness visit was done today. Patient understands recommendations and agrees with plan. Follow-up in 12 months.

## 2019-01-01 RX ORDER — ATORVASTATIN CALCIUM 20 MG/1
TABLET, FILM COATED ORAL
Qty: 90 TAB | Refills: 2 | Status: SHIPPED | OUTPATIENT
Start: 2019-01-01 | End: 2019-10-15 | Stop reason: SDUPTHER

## 2019-01-31 ENCOUNTER — TELEPHONE (OUTPATIENT)
Dept: INTERNAL MEDICINE CLINIC | Age: 79
End: 2019-01-31

## 2019-02-01 ENCOUNTER — TELEPHONE (OUTPATIENT)
Dept: INTERNAL MEDICINE CLINIC | Age: 79
End: 2019-02-01

## 2019-02-01 ENCOUNTER — OFFICE VISIT (OUTPATIENT)
Dept: INTERNAL MEDICINE CLINIC | Age: 79
End: 2019-02-01

## 2019-02-01 ENCOUNTER — HOSPITAL ENCOUNTER (OUTPATIENT)
Dept: LAB | Age: 79
Discharge: HOME | End: 2019-02-01
Payer: MEDICARE

## 2019-02-01 VITALS
OXYGEN SATURATION: 100 % | HEART RATE: 59 BPM | HEIGHT: 66 IN | BODY MASS INDEX: 26.84 KG/M2 | WEIGHT: 167 LBS | SYSTOLIC BLOOD PRESSURE: 138 MMHG | RESPIRATION RATE: 14 BRPM | TEMPERATURE: 98 F | DIASTOLIC BLOOD PRESSURE: 84 MMHG

## 2019-02-01 DIAGNOSIS — R06.02 SHORTNESS OF BREATH: Primary | ICD-10-CM

## 2019-02-01 DIAGNOSIS — R42 DIZZINESS: ICD-10-CM

## 2019-02-01 DIAGNOSIS — R14.0 FLATULENCE/GAS PAIN/BELCHING: ICD-10-CM

## 2019-02-01 DIAGNOSIS — R06.02 SHORTNESS OF BREATH: ICD-10-CM

## 2019-02-01 DIAGNOSIS — K21.9 GASTROESOPHAGEAL REFLUX DISEASE, ESOPHAGITIS PRESENCE NOT SPECIFIED: ICD-10-CM

## 2019-02-01 LAB
ALBUMIN SERPL-MCNC: 4.2 G/DL (ref 3.4–5)
ALBUMIN/GLOB SERPL: 1.4 {RATIO} (ref 0.8–1.7)
ALP SERPL-CCNC: 77 U/L (ref 45–117)
ALT SERPL-CCNC: 30 U/L (ref 16–61)
ANION GAP SERPL CALC-SCNC: 9 MMOL/L (ref 3–18)
AST SERPL-CCNC: 19 U/L (ref 15–37)
BASOPHILS # BLD: 0 K/UL (ref 0–0.1)
BASOPHILS NFR BLD: 1 % (ref 0–2)
BILIRUB SERPL-MCNC: 1.1 MG/DL (ref 0.2–1)
BUN SERPL-MCNC: 9 MG/DL (ref 7–18)
BUN/CREAT SERPL: 10 (ref 12–20)
CALCIUM SERPL-MCNC: 9.5 MG/DL (ref 8.5–10.1)
CHLORIDE SERPL-SCNC: 110 MMOL/L (ref 100–108)
CO2 SERPL-SCNC: 25 MMOL/L (ref 21–32)
CREAT SERPL-MCNC: 0.93 MG/DL (ref 0.6–1.3)
DIFFERENTIAL METHOD BLD: ABNORMAL
EOSINOPHIL # BLD: 0 K/UL (ref 0–0.4)
EOSINOPHIL NFR BLD: 1 % (ref 0–5)
ERYTHROCYTE [DISTWIDTH] IN BLOOD BY AUTOMATED COUNT: 13.3 % (ref 11.6–14.5)
GLOBULIN SER CALC-MCNC: 2.9 G/DL (ref 2–4)
GLUCOSE SERPL-MCNC: 85 MG/DL (ref 74–99)
HCT VFR BLD AUTO: 46.8 % (ref 36–48)
HGB BLD-MCNC: 15.8 G/DL (ref 13–16)
LYMPHOCYTES # BLD: 1 K/UL (ref 0.9–3.6)
LYMPHOCYTES NFR BLD: 18 % (ref 21–52)
MCH RBC QN AUTO: 30.9 PG (ref 24–34)
MCHC RBC AUTO-ENTMCNC: 33.8 G/DL (ref 31–37)
MCV RBC AUTO: 91.6 FL (ref 74–97)
MONOCYTES # BLD: 0.6 K/UL (ref 0.05–1.2)
MONOCYTES NFR BLD: 10 % (ref 3–10)
NEUTS SEG # BLD: 4.1 K/UL (ref 1.8–8)
NEUTS SEG NFR BLD: 70 % (ref 40–73)
PLATELET # BLD AUTO: 212 K/UL (ref 135–420)
PMV BLD AUTO: 9.6 FL (ref 9.2–11.8)
POTASSIUM SERPL-SCNC: 4.2 MMOL/L (ref 3.5–5.5)
PROT SERPL-MCNC: 7.1 G/DL (ref 6.4–8.2)
RBC # BLD AUTO: 5.11 M/UL (ref 4.7–5.5)
SODIUM SERPL-SCNC: 144 MMOL/L (ref 136–145)
WBC # BLD AUTO: 5.8 K/UL (ref 4.6–13.2)

## 2019-02-01 PROCEDURE — 85025 COMPLETE CBC W/AUTO DIFF WBC: CPT

## 2019-02-01 PROCEDURE — 80053 COMPREHEN METABOLIC PANEL: CPT

## 2019-02-01 RX ORDER — PHENOL/SODIUM PHENOLATE
20 AEROSOL, SPRAY (ML) MUCOUS MEMBRANE DAILY
Qty: 30 TAB | Refills: 1 | Status: SHIPPED | OUTPATIENT
Start: 2019-02-01 | End: 2019-03-12 | Stop reason: SDUPTHER

## 2019-02-01 NOTE — PROGRESS NOTES
1. Have you been to the ER, urgent care clinic or hospitalized since your last visit? NO.  
 
2. Have you seen or consulted any other health care providers outside of the [address] [place] [address] since your last visit (Include any pap smears or colon screening)?  NO

## 2019-02-01 NOTE — PROGRESS NOTES
HPI/History Hudson Pickens is a 66 y.o.  male who presents for evaluation. Over the last 2 wks, pt reports episodes of indigestion with increased belching/gas accompanied by dyspnea described as being winded and mild dizziness. Occur together and fairly brief. Indigestion seems to resolve with meals. Denies any typical chest or cardiac pains, palpitations, edema, orthopnea, or signs of thrombosis. 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 No fevers, cough, NV, or dark/bloody stools. Reports he has not used NSAIDs in about a month. He has used a couple of doses of tums or other, uncertain to benefit. Hx noted below including preHTN, IFG, HLD, and former smoker. Reports his resting heart rate is typically mid to upper 50s-60s without change. Of note, 3/2018 TSH normal; A1c 5.7; CBC, CMP, lipids unremarkable. No other sxs/complaints. Patient Active Problem List  
Diagnosis Code  Hyperlipidemia E78.5  Diverticulitis, Recurrent K57.92  
 Colon polyps K63.5  Onychomycosis B35.1  Abnormal glucose R73.09  
 Vitamin D insufficiency E55.9  Diverticulosis of large intestine without hemorrhage K57.30  Prehypertension R03.0 Past Medical History:  
Diagnosis Date  Abnormal glucose  Calculus of kidney  Colon polyps  Diverticulitis  Diverticulosis of colon 03/2016 Colonoscopy (3/2016) advanced left sided diverticulosis  Hyperlipidemia  Prehypertension 3/25/2017 Past Surgical History:  
Procedure Laterality Date  ENDOSCOPY, COLON, DIAGNOSTIC Jan 2011, Dr. Dexter Santos, colon polyps, was going yearly  HX BLEPHAROPLASTY  HX CATARACT REMOVAL  4/2012  
 both eyes  HX OTHER SURGICAL    
 cn  
 [place] COLONOSCOPY W/BIOPSY SINGLE/MULTIPLE  3-4-16 Dr. Judson Peña Social History Socioeconomic History  Marital status:  Spouse name: Not on file  Number of children: Not on file  Years of education: Not on file  Highest education level: Not on file Social Needs  Financial resource strain: Not on file  Food insecurity - worry: Not on file  Food insecurity - inability: Not on file  Transportation needs - medical: Not on file  Transportation needs - non-medical: Not on file Occupational History  Not on file Tobacco Use  Smoking status: Former Smoker Last attempt to quit: 1988 Years since quittin.1  Smokeless tobacco: Never Used Substance and Sexual Activity  Alcohol use: Yes Alcohol/week: 1.2 oz Types: 1 Shots of liquor, 1 Glasses of wine per week Comment: wine occasionally  Drug use: No  
 Sexual activity: Yes  
  Partners: Female Other Topics Concern  Not on file Social History Narrative  Not on file Family History Problem Relation Age of Onset Jarett Mcneal Arthritis-osteo Mother  Elevated Lipids Mother  Heart Disease Father Current Outpatient Medications Medication Sig  
 atorvastatin (LIPITOR) 20 mg tablet TAKE 1 TABLET DAILY  GLUC/DELANO-MSM#1/C/LEXIS/HARDEEP/BOR (OSTEO BI-FLEX TRIPLE STRENGTH PO) Take  by mouth.  cholecalciferol, vitamin D3, (VITAMIN D3) 2,000 unit tab Take 2,000 Units by mouth daily.  ibuprofen (ADVIL) 200 mg tablet Take  by mouth every six (6) hours as needed. Topical efudex and topical hydrocortisone by outside providers. No current facility-administered medications for this visit. No Known Allergies Review of Systems Pt has recently been using efudex and topical hydrocortisone on head as directed by other providers. Aside from those included in HPI, remainder of complete ROS negative. Physical Examination Visit Vitals /84 (BP 1 Location: Right arm, BP Patient Position: Sitting) Pulse (!) 59 Temp 98 °F (36.7 °C) (Oral) Resp 14 Ht 5' 6\" (1.676 m) Wt 167 lb (75.8 kg) SpO2 100% BMI 26.95 kg/m² EKG today: Sinus bradycardia; 54bpm; RSR (V1); nonspecific ST depression - nondiagnostic; No significant changes/findings from 12/3/14 tracing (Reviewed by Dr. Adolfo Turk). General - Alert and in no acute distress. Pt appears well, comfortable, and in good spirits. Pleasant, engaging. Nontoxic. Not anxious, non-diaphoretic. Mental status - Appropriate mood, behavior, speech content, dress, and thought processes. Eyes - Pupils equal and reactive, extraocular movements intact. No erythema or discharge. Neck - Supple without rigidity. Pulm - No cough during entire visit. Full, complete sentences. No tachypnea, retractions, or cyanosis. Good respiratory effort. Clear to auscultation bilat. No appreciable wheezes, rales, or rhonchi. Cardiovascular - Borderline bradycardia, regular rhythm. No appreciable murmurs or gallops. Abdomen - Nondistended. Active bowel sounds. Soft, nontender. No guarding, rigidity, or rebound. No appreciable masses. Extremities - No peripheral edema. No signs of thrombosis; negative Homans bilat. Distal pulses intact. Neuromuscular - No overt focal findings or movement disorder noted. Normal ambulation. Skin - Forehead/face findings c/w with use of efudex. Assessment and Plan 1. Episodes of indigestion with increased belching/gas, dyspnea, and mild dizziness over the last 2 wks. Pt reports he continues working out with no exertional sxs. EKG unremarkable. Will order stress test. Will check CBC and CMP. Will start omeprazole for the next several weeks and pt to incorporate small bland meals and avoid any NSAIDs. May consider cardio and/or GI pending progression. He will visit ED if any issues, worsening, or ominous developments as discussed. Further planning as warranted. Pt happily agrees with plan. PLEASE NOTE:  
This document has been produced using voice recognition software. Unrecognized errors in transcription may be present.  
 
Sonia Vuong PA-C 
 [facility] 
[phone] 
2/1/2019

## 2019-02-04 ENCOUNTER — TELEPHONE (OUTPATIENT)
Dept: INTERNAL MEDICINE CLINIC | Age: 79
End: 2019-02-04

## 2019-02-04 RX ORDER — CIPROFLOXACIN 500 MG/1
500 TABLET ORAL 2 TIMES DAILY
Qty: 20 TAB | Refills: 0 | Status: SHIPPED | OUTPATIENT
Start: 2019-02-04 | End: 2019-02-14

## 2019-02-04 RX ORDER — METRONIDAZOLE 500 MG/1
500 TABLET ORAL 3 TIMES DAILY
Qty: 30 TAB | Refills: 0 | Status: SHIPPED | OUTPATIENT
Start: 2019-02-04 | End: 2019-02-14

## 2019-02-04 NOTE — TELEPHONE ENCOUNTER
Regarding: Visit Follow-Up Question  Contact: [phone]  ----- Message from Antoinette Higgins LPN sent at 9/9/1164  8:51 AM EST -----       ----- Message from Brianda Hamlin to LORI Iqbal sent at 2/4/2019  8:45 AM -----   Ref. my visit on Friday:  During the weekend my symptoms were the same on Saturday, however, on Sunday I felt much better, almost normal.  Saturday I was lethargic. I have not been able to get the meds due to insurance issues. Nilda Robert is working on it. Please consider diverticulitis since I now have moderate lower stomach left pain, no chess pain or gas and now a little lethargic. I have had diverticulitis in the past and pretty sure this is the same. /81 P 58. I have not heard from stress test, know it is early AM, but willing to take test if you think it is necessary.   Thanks,  Hudson Pickens  Cell  [phone]

## 2019-02-04 NOTE — TELEPHONE ENCOUNTER
Pt aware of message below and verbalized understanding. No further questions or concerns from pt at this time. Patient stated that he will continue with the plan for the stress test. He stated that the pain has increased and that he is pretty confident that what he is experiencing is the diverticulitis. He stated that he will go ahead and get the medications from his pharmacy.

## 2019-02-04 NOTE — TELEPHONE ENCOUNTER
Remember the omeprazole is also OTC, which is likely to be a quicker option at this point and should at least get you started. I feel we should still proceed with the stress test to err on side of caution. As for possible diverticulitis. Recent CBC and CMP were normal. However, given similar presentation, etc, I will send over cipro and flagyl. If doing ok then can hold a few days but should start if NOT doing so well currently OR if any worsening from this aspect. Make sure to visit ED if significant worsening, fevers, or other concerning sxs. Again, I have sent cipro and flagyl to pharmacy. Keep us updated and f/u with Dr. Deanne Gil if needed.

## 2019-02-06 ENCOUNTER — TELEPHONE (OUTPATIENT)
Dept: INTERNAL MEDICINE CLINIC | Age: 79
End: 2019-02-06

## 2019-02-06 ENCOUNTER — HOSPITAL ENCOUNTER (OUTPATIENT)
Dept: NON INVASIVE DIAGNOSTICS | Age: 79
Discharge: HOME | End: 2019-02-06
Attending: PHYSICIAN ASSISTANT
Payer: MEDICARE

## 2019-02-06 VITALS
HEIGHT: 66 IN | BODY MASS INDEX: 26.84 KG/M2 | DIASTOLIC BLOOD PRESSURE: 80 MMHG | WEIGHT: 167 LBS | SYSTOLIC BLOOD PRESSURE: 120 MMHG

## 2019-02-06 DIAGNOSIS — R14.0 FLATULENCE/GAS PAIN/BELCHING: ICD-10-CM

## 2019-02-06 DIAGNOSIS — K21.9 GASTROESOPHAGEAL REFLUX DISEASE, ESOPHAGITIS PRESENCE NOT SPECIFIED: ICD-10-CM

## 2019-02-06 DIAGNOSIS — R06.02 SHORTNESS OF BREATH: ICD-10-CM

## 2019-02-06 DIAGNOSIS — R42 DIZZINESS: ICD-10-CM

## 2019-02-06 LAB
STRESS BASELINE DIAS BP: 80 MMHG
STRESS BASELINE HR: 54 BPM
STRESS BASELINE SYS BP: 120 MMHG
STRESS ESTIMATED WORKLOAD: 1.5 METS
STRESS EXERCISE DUR MIN: NORMAL
STRESS PEAK DIAS BP: 80 MMHG
STRESS PEAK SYS BP: 140 MMHG
STRESS PERCENT HR ACHIEVED: 71 %
STRESS POST PEAK HR: 86 BPM
STRESS RATE PRESSURE PRODUCT: NORMAL BPM*MMHG
STRESS ST DEPRESSION: 0 MM
STRESS ST ELEVATION: 0 MM
STRESS TARGET HR: 121 BPM

## 2019-02-06 PROCEDURE — 93017 CV STRESS TEST TRACING ONLY: CPT

## 2019-02-06 PROCEDURE — 74011250636 HC RX REV CODE- 250/636: Performed by: PHYSICIAN ASSISTANT

## 2019-02-06 RX ORDER — SODIUM CHLORIDE 0.9 % (FLUSH) 0.9 %
10 SYRINGE (ML) INJECTION AS NEEDED
Status: COMPLETED | OUTPATIENT
Start: 2019-02-06 | End: 2019-02-06

## 2019-02-06 RX ORDER — SODIUM CHLORIDE 9 MG/ML
100 INJECTION, SOLUTION INTRAVENOUS ONCE
Status: COMPLETED | OUTPATIENT
Start: 2019-02-06 | End: 2019-02-06

## 2019-02-06 RX ADMIN — SODIUM CHLORIDE 100 ML/HR: 900 INJECTION, SOLUTION INTRAVENOUS at 09:45

## 2019-02-06 RX ADMIN — REGADENOSON 0.4 MG: 0.08 INJECTION, SOLUTION INTRAVENOUS at 09:45

## 2019-02-06 RX ADMIN — Medication 10 ML: at 07:45

## 2019-02-06 NOTE — PROGRESS NOTES
Patient was injected with 28.3 millicuries 44ICT Sestamibi on 2/6/19 at 0745. Patient was injected with 21.3 millicuries 35TGE Sestamibi on 2/6/19 at 0945. Patient's armbands were removed and placed in shred-it box.  
 
Patient had a Nuclear Lexiscan Stress Test.

## 2019-02-06 NOTE — TELEPHONE ENCOUNTER
Stress test was unremarkable and pt considered low risk. He is already aware of the normal CBC and CMP. More recently, he expressed some signs of diverticulitis and I prescribed cipro and flagyl. How is he doing? He should continue the omeprazole as previously discussed. Have him f/u with Dr. Bailey Martino if no improvement, worsening, or developments.

## 2019-02-06 NOTE — TELEPHONE ENCOUNTER
Patient stated that he is still currently on the cipro and flagyl. He stated that he is still having some moderate abdominal pain in the lower abdomen at times and some episodes of feeling lethargic. He states overall he is improving. He will plan to continue omeprazole as discussed and stated that he will follow up with Dr. Lucia Garcia as needed. No additional questions or concerns at this time. DBYAMILETH is aware.

## 2019-02-07 ENCOUNTER — TELEPHONE (OUTPATIENT)
Dept: INTERNAL MEDICINE CLINIC | Age: 79
End: 2019-02-07

## 2019-02-07 NOTE — TELEPHONE ENCOUNTER
Regarding: Test Results Question  Contact: [phone]  ----- Message from Rema Castro LPN sent at 7/2/4720  4:59 PM EST -----       ----- Message from Nancy Pinto to LORI Walsh sent at 2/6/2019  4:32 PM -----   Received phone call from your office. Results of Nuclear Stress test as Moderate Risk. Not sure what this means as to what I should do or not do. Are there any activities that I should not do? I normally go to the gym 3 days a week and work out on the arc for 30 minutes for 1 &1/2 miles. Not sure what diverticulitis effects has to do with my lazy feeling. Might just have to wait for my 10 days run out with my meds before I might really know.       Thanks  Maritza Steiner

## 2019-02-07 NOTE — TELEPHONE ENCOUNTER
Results stated LOW risk, NOT moderate. Essentially, the stress test was normal but they usually do not say that explicitly. No changes in activities are warranted from cardiovascular standpoint. Continue exercise, healthy diet, and weight control. Diverticulitis can cause fatigue as it is an inflammatory, or infectious, type process. It could well account for the \"laziness\". We just need to make sure there is no worsening, fevers, or other concerning developments and that there IS overall improvement.

## 2019-03-12 ENCOUNTER — OFFICE VISIT (OUTPATIENT)
Dept: INTERNAL MEDICINE CLINIC | Age: 79
End: 2019-03-12

## 2019-03-12 VITALS
WEIGHT: 163.6 LBS | OXYGEN SATURATION: 98 % | TEMPERATURE: 97.5 F | BODY MASS INDEX: 26.29 KG/M2 | SYSTOLIC BLOOD PRESSURE: 136 MMHG | HEIGHT: 66 IN | DIASTOLIC BLOOD PRESSURE: 84 MMHG | HEART RATE: 60 BPM

## 2019-03-12 DIAGNOSIS — R63.0 DECREASED APPETITE: ICD-10-CM

## 2019-03-12 DIAGNOSIS — I10 ESSENTIAL HYPERTENSION: ICD-10-CM

## 2019-03-12 DIAGNOSIS — K57.30 DIVERTICULOSIS OF LARGE INTESTINE WITHOUT HEMORRHAGE: ICD-10-CM

## 2019-03-12 DIAGNOSIS — R73.09 ABNORMAL GLUCOSE: ICD-10-CM

## 2019-03-12 DIAGNOSIS — E78.5 HYPERLIPIDEMIA, UNSPECIFIED HYPERLIPIDEMIA TYPE: ICD-10-CM

## 2019-03-12 DIAGNOSIS — R10.32 LEFT LOWER QUADRANT PAIN: Primary | ICD-10-CM

## 2019-03-12 DIAGNOSIS — R53.83 FATIGUE, UNSPECIFIED TYPE: ICD-10-CM

## 2019-03-12 RX ORDER — PHENOL/SODIUM PHENOLATE
20 AEROSOL, SPRAY (ML) MUCOUS MEMBRANE DAILY
Qty: 30 TAB | Refills: 1 | Status: SHIPPED | OUTPATIENT
Start: 2019-03-12 | End: 2020-04-07

## 2019-03-12 NOTE — PROGRESS NOTES
1. Have you been to the ER, urgent care clinic or hospitalized since your last visit? NO.     2. Have you seen or consulted any other health care providers outside of the [address] [place] [address] since your last visit (Include any pap smears or colon screening)? NO      Do you have an Advanced Directive? YES    Would you like information on Advanced Directives?  NO

## 2019-03-16 PROBLEM — I10 ESSENTIAL HYPERTENSION: Status: ACTIVE | Noted: 2017-03-25

## 2019-03-16 PROBLEM — R10.32 LEFT LOWER QUADRANT PAIN: Status: ACTIVE | Noted: 2019-03-16

## 2019-03-16 PROBLEM — I10 ESSENTIAL HYPERTENSION: Status: RESOLVED | Noted: 2017-03-25 | Resolved: 2019-03-16

## 2019-03-16 PROBLEM — R03.0 PREHYPERTENSION: Status: RESOLVED | Noted: 2017-03-25 | Resolved: 2019-03-16

## 2019-03-16 PROBLEM — R63.0 DECREASED APPETITE: Status: ACTIVE | Noted: 2019-03-16

## 2019-03-16 PROBLEM — R53.83 FATIGUE: Status: ACTIVE | Noted: 2019-03-16

## 2019-03-16 NOTE — PROGRESS NOTES
HPI:   Jaylen Ocasio is a 66y.o. year old male who presents today for evaluation of abdominal pain. He has a history of hypertension, hyperlipidemia, abnormal glucose, and diverticulosis. He was evaluated by LORI Rawls on 2/1/2019 for complaint of episodes of indigestion accompanied by dyspnea and mild dizziness. He denied any typical chest pain, palpitations, edema, orthopnea, or signs of thrombosis. He also denied an association with exertion, continuing to exercise on treadmill for 1.5 mile without symptoms. Evaluation included EKG showing sinus bradycardia at 54 bpm and no ischemic changes. CBC and CMP were normal. He underwent a pharmacologic nuclear stress test (2/6/2019) which was a negative, low risk study without evidence of ischemia or prior infarction and normal LV function (EF 79%). He was started on omeprazole, and noted improvement in his reflux symptoms. However, began to have left sided abdominal pain, similar to prior episodes of diverticulitis, so he was prescribed Cipro and Flagyl for 10 days. He reports today that he completed antibiotics, but continues to have left sided abdominal pain particularly in the morning. He states that he is also having multiple bowel movements in the morning, although appears to be normal consistency. He also reports that the discomfort seems to improve after a bowel movement or passing flatus but never resolves. He denies any fever, chills, nausea, vomiting, melena, or hematochezia. He has lost nine pounds over the last year, with four pounds over the last month, but reports that he has been watching his diet. He does report decreased appetite and fatigue. He does not describe that the pain is related to eating. He is no longer taking omeprazole, but is taking Metamucil and a probiotic. He is otherwise without complaints. He has a history of hypertension, not requiring treatment with medication. He does not monitor his blood pressure at home.  He exercises Make sure he's had all necessary preops   regularly, going to the gym three times per week doing cardio for 30 minutes and light weights. He denies any chest pain, shortness of breath, lightheadedness, palpitations, edema, PND, or orthopnea. He also has a history of hyperlipidemia, treated with moderate intensity does atorvastatin. He has a history of diverticulosis, and has had multiple episodes of diverticulitis and the past. He reports that his last episode was approximately 5 years ago. He had a screening colonoscopy in 3/2016 by Dr. Buck Ya which showed advanced left-sided diverticulosis, and polyps in the cecum and ascending colon (pathology: tubular adenomas). Follow up recommended for five years. He denies any abdominal pain, nausea, vomiting, melena, hematochezia, or change in bowel habits. He has a history of abnormal glucose, with fasting blood sugar ranging from 102-106 from [id number] with a HbA1c of 5.8 at that time. Since 2014, however, his blood sugar and HbA1c have been in the normal range. He reports that this has improved since he is no longer drinking soda and has been watching his diet and weight. He has a regular eye exams with Dr. Pulliam Son. He denies any polyuria, polydipsia, nocturia, or blurry vision, and has no history of retinopathy, neuropathy, or nephropathy.       Past Medical History:   Diagnosis Date    Abnormal glucose     Calculus of kidney     Colon polyps     Diverticulitis     Diverticulosis of colon 03/2016    Colonoscopy (3/2016) advanced left sided diverticulosis    Essential hypertension 3/25/2017    Hyperlipidemia      Past Surgical History:   Procedure Laterality Date    ENDOSCOPY, COLON, DIAGNOSTIC      Jan 2011, Dr. Rolene Boeck, colon polyps, was going yearly    HX BLEPHAROPLASTY      HX CATARACT REMOVAL  4/2012    both eyes    HX OTHER SURGICAL      cn    [place] COLONOSCOPY W/BIOPSY SINGLE/MULTIPLE  3-4-16    Dr. Buck Ya     Current Outpatient Medications   Medication Sig    Omeprazole delayed release (PRILOSEC D/R) 20 mg tablet Take 1 Tab by mouth daily.  atorvastatin (LIPITOR) 20 mg tablet TAKE 1 TABLET DAILY    GLUC/DELANO-MSM#1/C/LEXIS/HARDEEP/BOR (OSTEO BI-FLEX TRIPLE STRENGTH PO) Take  by mouth.  cholecalciferol, vitamin D3, (VITAMIN D3) 2,000 unit tab Take 2,000 Units by mouth daily.  ibuprofen (ADVIL) 200 mg tablet Take  by mouth every six (6) hours as needed. No current facility-administered medications for this visit. Allergies and Intolerances:   No Known Allergies     Family History: His father  had CABG and  from an MI. His mother  at the age of 80. He has no family history of colon or prostate cancer. Family History   Problem Relation Age of Onset    Arthritis-osteo Mother     Elevated Lipids Mother     Heart Disease Father         Social History:   He  reports that he quit smoking about 30 years ago. he has never used smokeless tobacco. He smoked 1 ppd for 15 years, stopping in . He is  and has one daughter, Ashley Lantigua. He is a retired [employer]  and is self employed as an . Social History     Substance and Sexual Activity   Alcohol Use Yes    Alcohol/week: 1.2 oz    Types: 1 Shots of liquor, 1 Glasses of wine per week    Comment: wine occasionally     Immunization History:  Immunization History   Administered Date(s) Administered    Influenza High Dose Vaccine PF 2013, 2016, 2017, 10/02/2018    Influenza Vaccine 10/31/2014, 10/11/2015    Influenza Vaccine Split 10/17/2011, 10/23/2012    Influenza Vaccine Whole 10/12/2010    Pneumococcal Conjugate (PCV-13) 2015    TD Vaccine 2009    Tdap 2017    ZZZ-RETIRED (DO NOT USE) Pneumococcal Vaccine (Unspecified Type) 2005    Zoster 2009       Review of Systems:   As above included in HPI.   Otherwise 11 point review of systems negative including constitutional, skin, HENT, eyes, respiratory, cardiovascular, gastrointestinal, genitourinary, musculoskeletal, endocrine, hematologic, allergy, and neurologic. Physical:   Vitals:   BP: 136/84  HR: 60  WT: 163 lb 9.6 oz (74.2 kg)  BMI:  26.41 kg/m2    Exam:   Patient appears in no apparent distress. Affect is appropriate. HEENT: PERRLA, anicteric, oropharynx clear, no JVD, adenopathy or thyromegaly. No carotid bruits or radiated murmur. Lungs: clear to auscultation, no wheezes, rhonchi, or rales. Heart: regular rate and rhythm. No murmur, rubs, gallops  Abdomen: soft, nondistended, normal bowel sounds, no hepatosplenomegaly or masses; mild tenderness to palpation on left (lower quadrant>upper quadrant)  Extremities: without edema. Pulses 1-2+ bilaterally. Review of Data:  Labs:  No visits with results within 1 Month(s) from this visit. Latest known visit with results is:   Hospital Outpatient Visit on 02/06/2019   Component Date Value Ref Range Status    Target HR 02/06/2019 121  bpm Final    Exercise duration time 02/06/2019 00:03:00   Final    Stress Base Systolic BP 59/75/7852 808  mmHg Final    Stress Base Diastolic BP 91/45/3467 80  mmHg Final    Post peak HR 02/06/2019 86  BPM Final    Baseline HR 02/06/2019 54  BPM Final    Estimated workload 02/06/2019 1.5  METS Final    Baseline BP 02/06/2019 120  mmHg Final    Percent HR 02/06/2019 71  % Final    ST Elevation (mm) 02/06/2019 0  mm Final    ST Depression (mm) 02/06/2019 0  mm Final    Stress Base Diastolic BP 69/07/2149 80  mmHg Final    Stress Rate Pressure Product 02/06/2019 12,040  BPM*mmHg Final       Health Maintenance:  Screening:    Colorectal: colonoscopy (3/2016) tubular adenomas. Dr. Jazlyn Mora. Due 2021.    Depression: none   DM (HbA1c/FPG): / HbA1c 5.7 (3/2018)   Hepatitis C: N/A   Falls: none   DEXA: N/A   PSA/SHANTELL: PSA 1.2 (3/2018)   Glaucoma: regular eye exams with Dr. Anish Barrera (last 3/2018)   Smoking: distant past   Vitamin D: 28.2 (3/2018)   Medicare Wellness: 3/27/2018    Impression:  Patient Active Problem List   Diagnosis Code    Hyperlipidemia E78.5    Diverticulitis, Recurrent K57.92    Colon polyps K63.5    Onychomycosis B35.1    Abnormal glucose R73.09    Vitamin D insufficiency E55.9    Diverticulosis of large intestine without hemorrhage K57.30    Essential hypertension I10    Left lower quadrant pain R10.32    Decreased appetite R63.0    Fatigue R53.83       Plan:  1. Abdominal pain. Etiology remains unclear. Pain and tenderness localized on left (LLQ>LUQ). Treated initially with omeprazole, which seemed to help reflux/indigestion symptoms which were present initially. However, pain persists and did not seem to respond to Cipro and Flagyl when treated for presumed diverticulitis. Given presence of symptoms for more than 6 weeks, will proceed with imaging and obtain a CT scan abdomen/pelvis. Instructed to restart omeprazole 20 mg daily to address possible gastritis/GERD as cause for symptoms. Further recommendations pending result of imaging. 2. Hypertension. Previously well controlled without medication. Mildly elevated today. Advised to continue to monitor at home and bring readings to upcoming physical early next month. Renal function remains normal with creatinine 0.93 / eGFR >60. Continue to follow. 3. Hyperlipidemia. On moderate intensity dose atorvastatin (dose increased last visit to 20 mg daily) with LDL improved to 83.8, indicative of good control. Emphasized importance of continued lifestyle modifications, including diet, exercise, and weight loss. Will reassess at upcoming physical.   4. Abnormal glucose. Fasting glucose and HbA1c mildly elevated at last visit. Discussed importance of lifestyle modifications, including diet, exercise, and weight loss. Will reassess at upcoming physical. Continue to follow. 5. Diverticulosis. Extensive left sided disease on colonoscopy.  Had not had episode of diverticulitis in over 5 years, and not clear if recent symptoms were actually related to diverticulitis given lack of improvement with antibiotics. Continue high fiber diet and follow. 6. Health maintenance. Already received influenza vaccine. Also received Tdap. Shingrix vaccine script provided at last visit. Other immunizations up to date. Colonoscopy due 2021. Prostate cancer screening up to date. Patient wishing to continue following PSA. Vitamin D level remains mildly low. Patient will continue supplement. Continue regular eye exams with Dr. Humble Waldron. Medicare wellness visit due and will be performed at upcoming physical.     Patient understands recommendations and agrees with plan. Follow-up as previously scheduled.

## 2019-03-19 ENCOUNTER — HOSPITAL ENCOUNTER (OUTPATIENT)
Dept: CT IMAGING | Age: 79
Discharge: HOME | End: 2019-03-19
Attending: INTERNAL MEDICINE
Payer: MEDICARE

## 2019-03-19 DIAGNOSIS — R53.83 FATIGUE, UNSPECIFIED TYPE: ICD-10-CM

## 2019-03-19 DIAGNOSIS — K57.30 DIVERTICULOSIS OF LARGE INTESTINE WITHOUT HEMORRHAGE: ICD-10-CM

## 2019-03-19 DIAGNOSIS — R10.32 LEFT LOWER QUADRANT PAIN: ICD-10-CM

## 2019-03-19 DIAGNOSIS — R63.0 DECREASED APPETITE: ICD-10-CM

## 2019-03-19 LAB — CREAT UR-MCNC: 0.9 MG/DL (ref 0.6–1.3)

## 2019-03-19 PROCEDURE — 82565 ASSAY OF CREATININE: CPT

## 2019-03-19 PROCEDURE — 74011636320 HC RX REV CODE- 636/320: Performed by: INTERNAL MEDICINE

## 2019-03-19 PROCEDURE — 74177 CT ABD & PELVIS W/CONTRAST: CPT

## 2019-03-19 RX ADMIN — IOPAMIDOL 100 ML: 612 INJECTION, SOLUTION INTRAVENOUS at 08:24

## 2019-03-26 ENCOUNTER — APPOINTMENT (OUTPATIENT)
Dept: INTERNAL MEDICINE CLINIC | Age: 79
End: 2019-03-26

## 2019-03-26 ENCOUNTER — HOSPITAL ENCOUNTER (OUTPATIENT)
Dept: LAB | Age: 79
Discharge: HOME | End: 2019-03-26
Payer: MEDICARE

## 2019-03-26 DIAGNOSIS — R03.0 PREHYPERTENSION: ICD-10-CM

## 2019-03-26 DIAGNOSIS — R73.09 ABNORMAL GLUCOSE: ICD-10-CM

## 2019-03-26 DIAGNOSIS — Z12.5 SCREENING PSA (PROSTATE SPECIFIC ANTIGEN): ICD-10-CM

## 2019-03-26 DIAGNOSIS — E55.9 VITAMIN D INSUFFICIENCY: ICD-10-CM

## 2019-03-26 DIAGNOSIS — E78.5 HYPERLIPIDEMIA, UNSPECIFIED HYPERLIPIDEMIA TYPE: ICD-10-CM

## 2019-03-26 LAB
ALBUMIN SERPL-MCNC: 3.8 G/DL (ref 3.4–5)
ALBUMIN/GLOB SERPL: 1.5 {RATIO} (ref 0.8–1.7)
ALP SERPL-CCNC: 76 U/L (ref 45–117)
ALT SERPL-CCNC: 26 U/L (ref 16–61)
ANION GAP SERPL CALC-SCNC: 6 MMOL/L (ref 3–18)
APPEARANCE UR: CLEAR
AST SERPL-CCNC: 22 U/L (ref 15–37)
BACTERIA URNS QL MICRO: NEGATIVE /HPF
BASOPHILS # BLD: 0 K/UL (ref 0–0.1)
BASOPHILS NFR BLD: 1 % (ref 0–2)
BILIRUB SERPL-MCNC: 0.9 MG/DL (ref 0.2–1)
BILIRUB UR QL: NEGATIVE
BUN SERPL-MCNC: 11 MG/DL (ref 7–18)
BUN/CREAT SERPL: 11 (ref 12–20)
CALCIUM SERPL-MCNC: 8.5 MG/DL (ref 8.5–10.1)
CHLORIDE SERPL-SCNC: 110 MMOL/L (ref 100–108)
CHOLEST SERPL-MCNC: 148 MG/DL
CO2 SERPL-SCNC: 26 MMOL/L (ref 21–32)
COLOR UR: YELLOW
CREAT SERPL-MCNC: 0.96 MG/DL (ref 0.6–1.3)
DIFFERENTIAL METHOD BLD: ABNORMAL
EOSINOPHIL # BLD: 0.1 K/UL (ref 0–0.4)
EOSINOPHIL NFR BLD: 2 % (ref 0–5)
EPITH CASTS URNS QL MICRO: NORMAL /LPF (ref 0–5)
ERYTHROCYTE [DISTWIDTH] IN BLOOD BY AUTOMATED COUNT: 14 % (ref 11.6–14.5)
EST. AVERAGE GLUCOSE BLD GHB EST-MCNC: 111 MG/DL
GLOBULIN SER CALC-MCNC: 2.5 G/DL (ref 2–4)
GLUCOSE SERPL-MCNC: 97 MG/DL (ref 74–99)
GLUCOSE UR STRIP.AUTO-MCNC: NEGATIVE MG/DL
HBA1C MFR BLD: 5.5 % (ref 4.2–5.6)
HCT VFR BLD AUTO: 46.5 % (ref 36–48)
HDLC SERPL-MCNC: 40 MG/DL (ref 40–60)
HDLC SERPL: 3.7 {RATIO} (ref 0–5)
HGB BLD-MCNC: 15.7 G/DL (ref 13–16)
HGB UR QL STRIP: NEGATIVE
KETONES UR QL STRIP.AUTO: NEGATIVE MG/DL
LDLC SERPL CALC-MCNC: 84.8 MG/DL (ref 0–100)
LEUKOCYTE ESTERASE UR QL STRIP.AUTO: NEGATIVE
LIPID PROFILE,FLP: NORMAL
LYMPHOCYTES # BLD: 1 K/UL (ref 0.9–3.6)
LYMPHOCYTES NFR BLD: 18 % (ref 21–52)
MCH RBC QN AUTO: 31.3 PG (ref 24–34)
MCHC RBC AUTO-ENTMCNC: 33.8 G/DL (ref 31–37)
MCV RBC AUTO: 92.6 FL (ref 74–97)
MONOCYTES # BLD: 0.6 K/UL (ref 0.05–1.2)
MONOCYTES NFR BLD: 10 % (ref 3–10)
NEUTS SEG # BLD: 4 K/UL (ref 1.8–8)
NEUTS SEG NFR BLD: 69 % (ref 40–73)
NITRITE UR QL STRIP.AUTO: NEGATIVE
PH UR STRIP: 5 [PH] (ref 5–8)
PLATELET # BLD AUTO: 227 K/UL (ref 135–420)
PMV BLD AUTO: 9.6 FL (ref 9.2–11.8)
POTASSIUM SERPL-SCNC: 4 MMOL/L (ref 3.5–5.5)
PROT SERPL-MCNC: 6.3 G/DL (ref 6.4–8.2)
PROT UR STRIP-MCNC: NEGATIVE MG/DL
PSA SERPL-MCNC: 1.4 NG/ML (ref 0–4)
RBC # BLD AUTO: 5.02 M/UL (ref 4.7–5.5)
RBC #/AREA URNS HPF: 0 /HPF (ref 0–5)
SODIUM SERPL-SCNC: 142 MMOL/L (ref 136–145)
SP GR UR REFRACTOMETRY: 1.02 (ref 1–1.03)
TRIGL SERPL-MCNC: 116 MG/DL (ref ?–150)
TSH SERPL DL<=0.05 MIU/L-ACNC: 2.1 UIU/ML (ref 0.36–3.74)
UROBILINOGEN UR QL STRIP.AUTO: 0.2 EU/DL (ref 0.2–1)
VLDLC SERPL CALC-MCNC: 23.2 MG/DL
WBC # BLD AUTO: 5.7 K/UL (ref 4.6–13.2)
WBC URNS QL MICRO: NORMAL /HPF (ref 0–4)

## 2019-03-26 PROCEDURE — 81001 URINALYSIS AUTO W/SCOPE: CPT

## 2019-03-26 PROCEDURE — 83036 HEMOGLOBIN GLYCOSYLATED A1C: CPT

## 2019-03-26 PROCEDURE — 80053 COMPREHEN METABOLIC PANEL: CPT

## 2019-03-26 PROCEDURE — 82306 VITAMIN D 25 HYDROXY: CPT

## 2019-03-26 PROCEDURE — 80061 LIPID PANEL: CPT

## 2019-03-26 PROCEDURE — 84443 ASSAY THYROID STIM HORMONE: CPT

## 2019-03-26 PROCEDURE — 36415 COLL VENOUS BLD VENIPUNCTURE: CPT

## 2019-03-26 PROCEDURE — 85025 COMPLETE CBC W/AUTO DIFF WBC: CPT

## 2019-03-26 PROCEDURE — 84153 ASSAY OF PSA TOTAL: CPT

## 2019-03-27 LAB — 25(OH)D3 SERPL-MCNC: 45.7 NG/ML (ref 30–100)

## 2019-04-02 ENCOUNTER — OFFICE VISIT (OUTPATIENT)
Dept: INTERNAL MEDICINE CLINIC | Age: 79
End: 2019-04-02

## 2019-04-02 VITALS
BODY MASS INDEX: 27.49 KG/M2 | OXYGEN SATURATION: 98 % | SYSTOLIC BLOOD PRESSURE: 134 MMHG | HEIGHT: 65 IN | WEIGHT: 165 LBS | HEART RATE: 56 BPM | RESPIRATION RATE: 14 BRPM | DIASTOLIC BLOOD PRESSURE: 82 MMHG | TEMPERATURE: 97.5 F

## 2019-04-02 DIAGNOSIS — K57.30 DIVERTICULOSIS OF LARGE INTESTINE WITHOUT HEMORRHAGE: ICD-10-CM

## 2019-04-02 DIAGNOSIS — E78.5 HYPERLIPIDEMIA, UNSPECIFIED HYPERLIPIDEMIA TYPE: ICD-10-CM

## 2019-04-02 DIAGNOSIS — Z00.00 MEDICARE ANNUAL WELLNESS VISIT, SUBSEQUENT: ICD-10-CM

## 2019-04-02 DIAGNOSIS — I10 ESSENTIAL HYPERTENSION: Primary | ICD-10-CM

## 2019-04-02 DIAGNOSIS — K57.92 DIVERTICULITIS: ICD-10-CM

## 2019-04-02 DIAGNOSIS — Z12.5 ENCOUNTER FOR SCREENING FOR MALIGNANT NEOPLASM OF PROSTATE: ICD-10-CM

## 2019-04-02 DIAGNOSIS — R73.01 IMPAIRED FASTING GLUCOSE: ICD-10-CM

## 2019-04-02 DIAGNOSIS — Z71.89 ACP (ADVANCE CARE PLANNING): ICD-10-CM

## 2019-04-02 NOTE — ACP (ADVANCE CARE PLANNING)
Advance Care Planning (ACP) Provider Note - Comprehensive     Date of ACP Conversation: 04/02/19  Persons included in Conversation:  patient  Length of ACP Conversation in minutes:  16 minutes    Authorized Decision Maker (if patient is incapable of making informed decisions): wife and daughter  This person is:  Healthcare Agent/Medical Power of  under Advance Directive          General ACP for ALL Patients with Decision Making Capacity:   Importance of advance care planning, including choosing a healthcare agent to communicate patient's healthcare decisions if patient lost the ability to make decisions, such as after a sudden illness or accident  Understanding of the healthcare agent role was assessed and information provided  Exploration of values, goals, and preferences if recovery is not expected, even with continued medical treatment in the event of: Imminent death  Severe, permanent brain injury  \"In these circumstances, what matters most to you? \"  Care focused more on comfort or quality of life. Review of Existing Advance Directive:    Patient has an existing advance directive on file, signed 3/27/2018 . It designates his wife and daughter  as his healthcare agents and expresses that he does not wish life prolonging procedures for end of life care. It also specifies that he wishes to be considered as an organ donor. It was reviewed with him and still reflects his wishes.        For Serious or Chronic Illness:  Understanding of medical condition      Interventions Provided:  Reviewed existing Advance Directive   Recommended review of completed ACP document annually or upon change in health status

## 2019-04-02 NOTE — PROGRESS NOTES
This is the Subsequent Medicare Annual Wellness Exam, performed 12 months or more after the Initial AWV or the last Subsequent AWV I have reviewed the patient's medical history in detail and updated the computerized patient record. History Past Medical History:  
Diagnosis Date  Abnormal glucose  Calculus of kidney  Colon polyps  Diverticulitis  Diverticulosis of colon 2016 Colonoscopy (3/2016) advanced left sided diverticulosis  Essential hypertension 3/25/2017  Hyperlipidemia Past Surgical History:  
Procedure Laterality Date  ENDOSCOPY, COLON, DIAGNOSTIC 2011, Dr. Linda Johnston, colon polyps, was going yearly  HX BLEPHAROPLASTY  HX CATARACT REMOVAL  2012  
 both eyes  HX OTHER SURGICAL    
 cn  
 [place] COLONOSCOPY W/BIOPSY SINGLE/MULTIPLE  3-4-16 Dr. Rodgers [place] Current Outpatient Medications Medication Sig Dispense Refill  Omeprazole delayed release (PRILOSEC D/R) 20 mg tablet Take 1 Tab by mouth daily. 30 Tab 1  
 atorvastatin (LIPITOR) 20 mg tablet TAKE 1 TABLET DAILY 90 Tab 2  
 GLUC/DELANO-MSM#1/C/LEXIS/HARDEEP/BOR (OSTEO BI-FLEX TRIPLE STRENGTH PO) Take  by mouth.  cholecalciferol, vitamin D3, (VITAMIN D3) 2,000 unit tab Take 2,000 Units by mouth daily. No Known Allergies Family History Problem Relation Age of Onset [facility] Arthritis-osteo Mother  Elevated Lipids Mother  Heart Disease Father Social History Tobacco Use  Smoking status: Former Smoker Last attempt to quit: 1988 Years since quittin.3  Smokeless tobacco: Never Used Substance Use Topics  Alcohol use: Yes Alcohol/week: 0.6 oz Types: 1 Glasses of wine per week Patient Active Problem List  
Diagnosis Code  Hyperlipidemia E78.5  Diverticulitis, Recurrent K57.92  
 Colon polyps K63.5  Onychomycosis B35.1  Impaired fasting glucose R73.01  
 Diverticulosis of large intestine without hemorrhage K57.30  Essential hypertension I10 Depression Risk Factor Screening:  
 
3 most recent PHQ Screens 4/2/2019 Little interest or pleasure in doing things Not at all Feeling down, depressed, irritable, or hopeless Not at all Total Score PHQ 2 0 Alcohol Risk Factor Screening: You do not drink alcohol or very rarely. Functional Ability and Level of Safety:  
Hearing Loss Hearing is good. Activities of Daily Living The home contains: no safety equipment. Patient does total self care Fall Risk Fall Risk Assessment, last 12 mths 4/2/2019 Able to walk? Yes Fall in past 12 months? Yes Fall with injury? Yes  
Number of falls in past 12 months 1 Fall Risk Score 2 Abuse Screen Patient is not abused Cognitive Screening Evaluation of Cognitive Function: 
Has your family/caregiver stated any concerns about your memory: no 
Normal 
 
Patient Care Team  
Patient Care Team: 
Ann Bah MD as PCP - General (Internal Medicine) Varun Howard MD (Ophthalmology) Tomas Harper MD (Plastic Surgery) Assessment/Plan Education and counseling provided: 
Are appropriate based on today's review and evaluation End-of-Life planning (with patient's consent) Influenza Vaccine Prostate cancer screening tests (PSA, covered annually) Colorectal cancer screening tests Cardiovascular screening blood test 
Screening for glaucoma Diabetes screening test 
Shingrix vaccine Diagnoses and all orders for this visit: 1. Essential hypertension 
-     CBC WITH AUTOMATED DIFF; Future 
-     HEMOGLOBIN A1C WITH EAG; Future -     LIPID PANEL; Future -     METABOLIC PANEL, COMPREHENSIVE; Future -     PSA SCREENING (SCREENING); Future 
-     TSH 3RD GENERATION; Future -     URINALYSIS W/MICROSCOPIC; Future -     VITAMIN D, 25 HYDROXY; Future 2. Hyperlipidemia, unspecified hyperlipidemia type 
-     CBC WITH AUTOMATED DIFF; Future 
-     HEMOGLOBIN A1C WITH EAG; Future -     LIPID PANEL; Future -     METABOLIC PANEL, COMPREHENSIVE; Future -     PSA SCREENING (SCREENING); Future 
-     TSH 3RD GENERATION; Future -     URINALYSIS W/MICROSCOPIC; Future -     VITAMIN D, 25 HYDROXY; Future 3. Impaired fasting glucose 
-     CBC WITH AUTOMATED DIFF; Future 
-     HEMOGLOBIN A1C WITH EAG; Future -     LIPID PANEL; Future -     METABOLIC PANEL, COMPREHENSIVE; Future -     PSA SCREENING (SCREENING); Future 
-     TSH 3RD GENERATION; Future -     URINALYSIS W/MICROSCOPIC; Future -     VITAMIN D, 25 HYDROXY; Future 4. Diverticulosis of large intestine without hemorrhage 
-     CBC WITH AUTOMATED DIFF; Future 
-     HEMOGLOBIN A1C WITH EAG; Future -     LIPID PANEL; Future -     METABOLIC PANEL, COMPREHENSIVE; Future -     PSA SCREENING (SCREENING); Future 
-     TSH 3RD GENERATION; Future -     URINALYSIS W/MICROSCOPIC; Future -     VITAMIN D, 25 HYDROXY; Future 5. Diverticulitis, Recurrent 
-     CBC WITH AUTOMATED DIFF; Future 
-     HEMOGLOBIN A1C WITH EAG; Future -     LIPID PANEL; Future -     METABOLIC PANEL, COMPREHENSIVE; Future -     PSA SCREENING (SCREENING); Future 
-     TSH 3RD GENERATION; Future -     URINALYSIS W/MICROSCOPIC; Future -     VITAMIN D, 25 HYDROXY; Future 6. Encounter for screening for malignant neoplasm of prostate -     PSA SCREENING (SCREENING); Future 7. Medicare annual wellness visit, subsequent 8. ACP (advance care planning) Health Maintenance Due Topic Date Due  Pneumococcal 65+ years (2 of 2 - PPSV23) 01/05/2016

## 2019-04-02 NOTE — PROGRESS NOTES
Chief Complaint Patient presents with Vijay Hutchinson Annual Wellness Visit Yearly physical with lab results. Health Maintenance Due Topic Date Due  Shingrix Vaccine Age 50> (1 of 2) 04/27/1990  Pneumococcal 65+ years (2 of 2 - PPSV23) 01/05/2016  MEDICARE YEARLY EXAM  03/28/2019 1. Have you been to the ER, urgent care clinic or hospitalized since your last visit? NO.  
 
2. Have you seen or consulted any other health care providers outside of the [address] [place] [address] since your last visit (Include any pap smears or colon screening)? YES, last seen 2 weeks ago with Dr. Trevor Morales for eye exam.   
 
 
Learning Assessment 4/2/2019 PRIMARY LEARNER Patient BARRIERS PRIMARY LEARNER NONE PRIMARY LANGUAGE ENGLISH  
LEARNER PREFERENCE PRIMARY DEMONSTRATION  
ANSWERED BY patient RELATIONSHIP SELF

## 2019-04-02 NOTE — PATIENT INSTRUCTIONS
Please monitor and record your blood pressure every morning for the next 2 weeks and please call or send message if >130/80. Medicare Wellness Visit, Male The best way to improve and maintain good health is to have a healthy lifestyle by eating a well-balanced diet, exercising regularly, limiting alcohol and stopping smoking. Regular visits with your physician or non-physician health care provider also support your good health. Preventive screening tests can find health problems before they become diseases or illnesses. Preventive services such as immunizations prevent serious infections. All people over age 72 should have a Pneumovax and a Prevnar-13 shot to prevent potentially life threatening infections with the pneumococcus bacteria, a common cause of pneumonia. These are once in a lifetime unless you and your provider decide differently. All people over 65 should have a yearly influenza vaccine or \"flu\" shot. This does not prevent infection with cold viruses but has been proven to prevent hospitalization and death from influenza. Although Medicare part B \"regular Medicare\" currently only covers tetanus vaccination in the context of an injury, a tetanus vaccine (Tdap or Td) is recommended every 10 years. A shingles vaccine is recommended once in a lifetime after age 61. The Shingles vaccine is also not covered by Medicare part B. Note, however, that both the Shingles vaccine and Tdap/Td are generally covered by secondary carriers. Please check your coverage and out of pocket expenses. Consider contacting your local health department because it may stock these vaccines for a reasonable charge. We currently have documentation of the following immunization history for you: 
Immunization History Administered Date(s) Administered  (RETIRED) Pneumococcal Vaccine (Unspecified Type) 01/01/2005  Influenza High Dose Vaccine PF 11/01/2013, 11/02/2016, 09/23/2017, 10/02/2018  Influenza Vaccine 10/31/2014, 10/11/2015  Influenza Vaccine Split 10/17/2011, 10/23/2012  Influenza Vaccine Whole 10/12/2010  Pneumococcal Conjugate (PCV-13) 01/05/2015  Pneumococcal Polysaccharide (PPSV-23) 01/01/2005  TD Vaccine 01/01/2009  Tdap 11/07/2017  Zoster 01/01/2009 Screening for infection with Hepatitis C is recommended for anyone born between [birth year] through [address]. The table at the bottom of this document indicates the status of this and other screening services. Screening for diabetes mellitus with a blood sugar test (glucose) should be done at least every 3 years until age 79. You and your health care provider may decide whether to continue screening after age 79. The most recent blood glucose we have on file for you is:  
Lab Results Component Value Date/Time Glucose 97 03/26/2019 08:32 AM  
 
 
Glaucoma is a disease of the eye due to increased ocular pressure that can lead to blindness. People with risk factors for glaucoma ( race, diabetes, family history) should be screened at least every 2 years by an eye professional. This may be covered annually if indicated as determined by you and your doctor. Cardiovascular screening tests that check for elevated lipids or cholesterol (fatty part of blood) which can lead to heart disease and strokes should be done every 4-6 years through age 79. You and your health care provider may decide whether to continue screening after age 79. The most recent lipid panel we have on file for you is:  
Lab Results Component Value Date/Time  Cholesterol, total 148 03/26/2019 08:32 AM  
 HDL Cholesterol 40 03/26/2019 08:32 AM  
 LDL, calculated 84.8 03/26/2019 08:32 AM  
 VLDL, calculated 23.2 03/26/2019 08:32 AM  
 Triglyceride 116 03/26/2019 08:32 AM  
 CHOL/HDL Ratio 3.7 03/26/2019 08:32 AM  
 
 
Colorectal cancer screening that evaluates for blood or polyps in your colon for people with average risk should be done yearly as a stool test, every five years as a flexible sigmoidoscope or every 10 years as a colonoscopy up to age 76. You and your health care provider may decide whether to continue screening after age 76. Men up to age 76 may elect to screen for prostate cancer with a blood test called a PSA at certain intervals, depending on their personal and family history. This decision is between the patient and his provider. The most recent PSA values we have on file for you are: 
Lab Results Component Value Date/Time  
 Prostate Specific Ag 1.4 03/26/2019 08:32 AM  
 Prostate Specific Ag 1.2 03/20/2018 09:20 AM  
 Prostate Specific Ag 1.0 02/28/2017 09:03 AM  
 Prostate Specific Ag 1.5 11/24/2014 08:18 AM  
 Prostate Specific Ag 1.8 10/23/2013 07:55 AM  
 Prostate Specific Ag 2.4 10/15/2012 03:20 PM  
 
 
If you have been a smoker or had family history of abdominal aortic aneurysms, you and your provider may decide to schedule an ultrasound test of your aorta. Our records show this was done on:  n/a People who have smoked the equivalent of 1 pack per day for 30 years or more may benefit from screening for lung cancer with a yearly low dose CT scan until they have been non smokers for 15 years or competing health conditions render this unlikely to be beneficial. Our records show: n/a Your Medicare Wellness Exam is recommended annually. Here is a list of your current Health Maintenance items with a due date: 
Health Maintenance Topic Date Due  Pneumococcal 65+ years (2 of 2 - PPSV23) 01/05/2016  Shingrix Vaccine Age 50> (1 of 2) 02/13/2020 (Originally 4/27/1990)  Influenza Age 5 to Adult  08/01/2019  GLAUCOMA SCREENING Q2Y  03/16/2020  MEDICARE YEARLY EXAM  04/02/2020  COLONOSCOPY  03/04/2021  
 DTaP/Tdap/Td series (2 - Td) 11/07/2027

## 2019-04-07 PROBLEM — R63.0 DECREASED APPETITE: Status: RESOLVED | Noted: 2019-03-16 | Resolved: 2019-04-07

## 2019-04-07 PROBLEM — R53.83 FATIGUE: Status: RESOLVED | Noted: 2019-03-16 | Resolved: 2019-04-07

## 2019-04-07 PROBLEM — R10.32 LEFT LOWER QUADRANT PAIN: Status: RESOLVED | Noted: 2019-03-16 | Resolved: 2019-04-07

## 2019-04-07 NOTE — PROGRESS NOTES
HPI:  
Haley Rodriguez is a 66y.o. year old male who presents today for a physical exam and for evaluation of of hypertension, hyperlipidemia, abnormal glucose, and diverticulosis. He was evaluated by LORI Ayala on 2/1/2019 for complaint of episodes of indigestion accompanied by dyspnea and mild dizziness. He denied any typical chest pain, palpitations, edema, orthopnea, or signs of thrombosis. He also denied an association with exertion, continuing to exercise on treadmill for 1.5 mile without symptoms. Evaluation included EKG showing sinus bradycardia at 54 bpm and no ischemic changes. CBC and CMP were normal. He underwent a pharmacologic nuclear stress test (2/6/2019) which was a negative, low risk study without evidence of ischemia or prior infarction and normal LV function (EF 79%). He was started on omeprazole, and noted improvement in his reflux symptoms. However, began to have left sided abdominal pain, similar to prior episodes of diverticulitis, so he was prescribed Cipro and Flagyl for 10 days. He was seen again on 3/12/2019 for persistent left sided abdominal pain, particularly in the morning. He stated that he was also having multiple bowel movements in the morning, appearing to be of normal consistency. He also reported that the discomfort improved after a bowel movement or passing flatus but never resolved. He denied any fever, chills, nausea, vomiting, melena, or hematochezia. He had lost nine pounds over the last year by watching his diet, and did not report decreased appetite and fatigue. He underwent an abdominal CT scan (3/19/2019) which showed no acute findings to explain his symptoms, significant distal colonic diverticulosis without diverticulitis, and an infrarenal AAA showing mild increase in caliber from 2.5 to 2.9 cm since a CT scan in 2010. He also was restarted on omeprazole and reports today that his abdominal pain has significantly improved.  He states that is unsure if it is the omeprazole that is helping since he has also started taking Metamucil. He states that he believes that most of his discomfort may have been due to gas. He is otherwise without complaints and feeling generally well. He has a history of hypertension, not requiring treatment with medication. He states that he monitors his blood pressure only occasionally at home, and reports most readings 120-130/ 70-80. He exercises regularly, going to the gym three times per week doing cardio for 30 minutes and light weights. He denies any chest pain, shortness of breath, lightheadedness, palpitations, edema, PND, or orthopnea. He also has a history of hyperlipidemia, treated with moderate intensity does atorvastatin. He has a history of diverticulosis, and has had multiple episodes of diverticulitis and the past. He had a screening colonoscopy in 3/2016 by Dr. Hien Harley which showed advanced left-sided diverticulosis, and polyps in the cecum and ascending colon (pathology: tubular adenomas). Follow up recommended for five years. He denies any abdominal pain, nausea, vomiting, melena, hematochezia, or change in bowel habits. He has a history of abnormal glucose, with fasting blood sugar ranging from 102-106 from [id number] with a HbA1c of 5.8 at that time. Since 2014, however, his blood sugar and HbA1c have been in the normal range. He reports that this has improved since he is no longer drinking soda and has been watching his diet and weight. He has a regular eye exams with Dr. Claudette Promise. He denies any polyuria, polydipsia, nocturia, or blurry vision, and has no history of retinopathy, neuropathy, or nephropathy. Past Medical History:  
Diagnosis Date  Abnormal glucose  Calculus of kidney  Colon polyps  Diverticulitis  Diverticulosis of colon 03/2016 Colonoscopy (3/2016) advanced left sided diverticulosis  Essential hypertension 3/25/2017  Hyperlipidemia Past Surgical History: Procedure Laterality Date  ENDOSCOPY, COLON, DIAGNOSTIC 2011, Dr. Carranza Files, colon polyps, was going yearly  HX BLEPHAROPLASTY  HX CATARACT REMOVAL  2012  
 both eyes  HX OTHER SURGICAL    
 cn  
 [place] COLONOSCOPY W/BIOPSY SINGLE/MULTIPLE  3- Dr. Liliane Mcburney Current Outpatient Medications Medication Sig  Omeprazole delayed release (PRILOSEC D/R) 20 mg tablet Take 1 Tab by mouth daily.  atorvastatin (LIPITOR) 20 mg tablet TAKE 1 TABLET DAILY  GLUC/DELANO-MSM#1/C/LEXIS/HARDEEP/BOR (OSTEO BI-FLEX TRIPLE STRENGTH PO) Take  by mouth.  cholecalciferol, vitamin D3, (VITAMIN D3) 2,000 unit tab Take 2,000 Units by mouth daily. No current facility-administered medications for this visit. Allergies and Intolerances:  
No Known Allergies Family History: His father  had CABG and  from an MI. His mother  at the age of 80. He has no family history of colon or prostate cancer. Family History Problem Relation Age of Onset Orma Damme Arthritis-osteo Mother  Elevated Lipids Mother  Heart Disease Father Social History: He  reports that he quit smoking about 30 years ago. He has never used smokeless tobacco. He smoked 1 ppd for 15 years, stopping in . He is  and has one daughter, Isidro Cuadra. He is a retired [employer]  and is self employed as an . Social History Substance and Sexual Activity Alcohol Use Yes  Alcohol/week: 0.6 oz  Types: 1 Glasses of wine per week Immunization History: 
Immunization History Administered Date(s) Administered  (RETIRED) Pneumococcal Vaccine (Unspecified Type) 2005  Influenza High Dose Vaccine PF 2013, 2016, 2017, 10/02/2018  Influenza Vaccine 10/31/2014, 10/11/2015  Influenza Vaccine Split 10/17/2011, 10/23/2012  Influenza Vaccine Whole 10/12/2010  Pneumococcal Conjugate (PCV-13) 2015  Pneumococcal Polysaccharide (PPSV-23) 01/01/2005  TD Vaccine 01/01/2009  Tdap 11/07/2017  Zoster 01/01/2009 Review of Systems: As above included in HPI. Otherwise 11 point review of systems negative including constitutional, skin, HENT, eyes, respiratory, cardiovascular, gastrointestinal, genitourinary, musculoskeletal, endocrine, hematologic, allergy, and neurologic. Physical:  
Vitals:  
BP: 134/82; repeat 134/78 left arm HR: (!) 56 WT: 165 lb (74.8 kg) BMI:  27.46 kg/m2 Exam:  
Patient appears in no apparent distress. Affect is appropriate. HEENT --Anicteric sclerae, tympanic membranes normal,  ear canals normal. 
PERRL, EOMI, conjunctiva and lids normal.  
Sinuses were nontender, turbinates normal, hearing normal.  Oropharynx without 
erythema, normal tongue, oral mucosa and tonsils. No cervical lymphadenopathy. No thyromegaly, JVD, or bruits. Carotid pulses 2+ with normal upstroke. Lungs --Clear to auscultation. No wheezing or rales. Heart --Regular rate and rhythm, no murmurs, rubs, gallops, or clicks. Chest wall --Nontender to palpation. PMI normal. 
Abdomen -- Soft and nontender, no hepatosplenomegaly or masses. Extremities -- Without cyanosis, clubbing, edema. 2+ pulses equally and bilaterally. Normal looking digits, ROM intact Neuro -- CN 2-12 intact, strength 5/5 with intact soft touch in all extremities Derm  no obvious abnormalities noted, no rash Review of Data: 
Labs: Hospital Outpatient Visit on 03/26/2019 Component Date Value Ref Range Status  WBC 03/26/2019 5.7  4.6 - 13.2 K/uL Final  
 RBC 03/26/2019 5.02  4.70 - 5.50 M/uL Final  
 HGB 03/26/2019 15.7  13.0 - 16.0 g/dL Final  
 HCT 03/26/2019 46.5  36.0 - 48.0 % Final  
 MCV 03/26/2019 92.6  74.0 - 97.0 FL Final  
 MCH 03/26/2019 31.3  24.0 - 34.0 PG Final  
 MCHC 03/26/2019 33.8  31.0 - 37.0 g/dL Final  
 RDW 03/26/2019 14.0  11.6 - 14.5 % Final  
  PLATELET 48/79/8574 561  135 - 420 K/uL Final  
 MPV 03/26/2019 9.6  9.2 - 11.8 FL Final  
 NEUTROPHILS 03/26/2019 69  40 - 73 % Final  
 LYMPHOCYTES 03/26/2019 18* 21 - 52 % Final  
 MONOCYTES 03/26/2019 10  3 - 10 % Final  
 EOSINOPHILS 03/26/2019 2  0 - 5 % Final  
 BASOPHILS 03/26/2019 1  0 - 2 % Final  
 ABS. NEUTROPHILS 03/26/2019 4.0  1.8 - 8.0 K/UL Final  
 ABS. LYMPHOCYTES 03/26/2019 1.0  0.9 - 3.6 K/UL Final  
 ABS. MONOCYTES 03/26/2019 0.6  0.05 - 1.2 K/UL Final  
 ABS. EOSINOPHILS 03/26/2019 0.1  0.0 - 0.4 K/UL Final  
 ABS. BASOPHILS 03/26/2019 0.0  0.0 - 0.1 K/UL Final  
 DF 03/26/2019 AUTOMATED    Final  
 Hemoglobin A1c 03/26/2019 5.5  4.2 - 5.6 % Final  
 Est. average glucose 03/26/2019 111  mg/dL Final  
 LIPID PROFILE 03/26/2019        Final  
 Cholesterol, total 03/26/2019 148  <200 MG/DL Final  
 Triglyceride 03/26/2019 116  <150 MG/DL Final  
 HDL Cholesterol 03/26/2019 40  40 - 60 MG/DL Final  
 LDL, calculated 03/26/2019 84.8  0 - 100 MG/DL Final  
 VLDL, calculated 03/26/2019 23.2  MG/DL Final  
 CHOL/HDL Ratio 03/26/2019 3.7  0 - 5.0   Final  
 Sodium 03/26/2019 142  136 - 145 mmol/L Final  
 Potassium 03/26/2019 4.0  3.5 - 5.5 mmol/L Final  
 Chloride 03/26/2019 110* 100 - 108 mmol/L Final  
 CO2 03/26/2019 26  21 - 32 mmol/L Final  
 Anion gap 03/26/2019 6  3.0 - 18 mmol/L Final  
 Glucose 03/26/2019 97  74 - 99 mg/dL Final  
 BUN 03/26/2019 11  7.0 - 18 MG/DL Final  
 Creatinine 03/26/2019 0.96  0.6 - 1.3 MG/DL Final  
 BUN/Creatinine ratio 03/26/2019 11* 12 - 20   Final  
 GFR est AA 03/26/2019 >60  >60 ml/min/1.73m2 Final  
 GFR est non-AA 03/26/2019 >60  >60 ml/min/1.73m2 Final  
 Calcium 03/26/2019 8.5  8.5 - 10.1 MG/DL Final  
 Bilirubin, total 03/26/2019 0.9  0.2 - 1.0 MG/DL Final  
 ALT (SGPT) 03/26/2019 26  16 - 61 U/L Final  
 AST (SGOT) 03/26/2019 22  15 - 37 U/L Final  
 Alk.  phosphatase 03/26/2019 76  45 - 117 U/L Final  
  Protein, total 03/26/2019 6.3* 6.4 - 8.2 g/dL Final  
 Albumin 03/26/2019 3.8  3.4 - 5.0 g/dL Final  
 Globulin 03/26/2019 2.5  2.0 - 4.0 g/dL Final  
 A-G Ratio 03/26/2019 1.5  0.8 - 1.7   Final  
 Color 03/26/2019 YELLOW    Final  
 Appearance 03/26/2019 CLEAR    Final  
 Specific gravity 03/26/2019 1.023  1.005 - 1.030   Final  
 pH (UA) 03/26/2019 5.0  5.0 - 8.0   Final  
 Protein 03/26/2019 NEGATIVE   NEG mg/dL Final  
 Glucose 03/26/2019 NEGATIVE   NEG mg/dL Final  
 Ketone 03/26/2019 NEGATIVE   NEG mg/dL Final  
 Bilirubin 03/26/2019 NEGATIVE   NEG   Final  
 Blood 03/26/2019 NEGATIVE   NEG   Final  
 Urobilinogen 03/26/2019 0.2  0.2 - 1.0 EU/dL Final  
 Nitrites 03/26/2019 NEGATIVE   NEG   Final  
 Leukocyte Esterase 03/26/2019 NEGATIVE   NEG   Final  
 WBC 03/26/2019 0 to 1  0 - 4 /hpf Final  
 RBC 03/26/2019 0  0 - 5 /hpf Final  
 Epithelial cells 03/26/2019 FEW  0 - 5 /lpf Final  
 Bacteria 03/26/2019 NEGATIVE   NEG /hpf Final  
 TSH 03/26/2019 2.10  0.36 - 3.74 uIU/mL Final  
 Prostate Specific Ag 03/26/2019 1.4  0.0 - 4.0 ng/mL Final  
 Vitamin D 25-Hydroxy 03/26/2019 45.7  30 - 100 ng/mL Final  
Hospital Outpatient Visit on 03/19/2019 Component Date Value Ref Range Status  Creatinine, POC 03/19/2019 0.9  0.6 - 1.3 MG/DL Final  
 GFRAA, POC 03/19/2019 >60  >60 ml/min/1.73m2 Final  
 GFRNA, POC 03/19/2019 >60  >60 ml/min/1.73m2 Final  
 
 
Health Maintenance: 
Screening:  
 Colorectal: colonoscopy (3/2016) tubular adenomas. Dr. Lalo Leal. Due 2021. Depression: none DM (HbA1c/FPG): FPG 97/ HbA1c 5.5 (3/2019) Hepatitis C: N/A Falls: none DEXA: N/A 
 PSA/SHANTELL: PSA 1.4 (3/2019) Glaucoma: regular eye exams with Dr. Mary West (last 3/2019) Smoking: distant past 
 Vitamin D: 45.7 (3/2019) Medicare Wellness: today Impression: 
Patient Active Problem List  
Diagnosis Code  Hyperlipidemia E78.5  Diverticulitis, Recurrent K57.92  
 Colon polyps K63.5  Onychomycosis B35.1  Abnormal glucose R73.09  
 Vitamin D insufficiency E55.9  Diverticulosis of large intestine without hemorrhage K57.30  Essential hypertension I10  Left lower quadrant pain R10.32  
 Decreased appetite R63.0  Fatigue R53.83 Plan: 1. Abdominal pain. Now resolved. Initially presented with pain and tenderness localized on left (LLQ>LUQ) and treated with omeprazole, which seemed to help reflux/indigestion symptoms but not the pain. Did not respond to a course of Cipro and Flagyl when treated for presumed diverticulitis. CT scan abdomen/pelvis negative for acute findings to explain symptoms. Restarted omeprazole 20 mg daily and also started taking Metamucil with improvement. Will continue to follow. 2. Hypertension. Essentially appears well controlled without medication. Reports well controlled at home, but only checking intermittently. Discussed monitoring more frequently and calling if >130/80. Renal function remains normal with creatinine 0.96 / eGFR >60. Continue to follow. 3. Hyperlipidemia. On moderate intensity dose atorvastatin 20 mg daily with LDL improved to 84 and HDL 40, indicative of good control. Emphasized importance of continued lifestyle modifications, including diet, exercise, and weight loss. 4. Abnormal glucose. Fasting glucose and HbA1c mildly elevated at last visit. However, decreased seven pounds over last year and both normalized today. Discussed importance of continued lifestyle modifications, including diet, exercise, and weight loss. Will continue to follow. 5. Diverticulosis. Extensive left sided disease on colonoscopy. Had not had episode of diverticulitis in over 5 years, and recent symptoms not likely related to diverticulitis given lack of improvement with antibiotics. Continue high fiber diet and follow. 6. Health maintenance. Already received influenza vaccine.  Also received Tdap. Shingrix vaccine expensive so holding off for now. Other immunizations up to date. Colonoscopy due 2021. Prostate cancer screening up to date. Patient wishing to continue following PSA. Vitamin D level now in normal range. Continue supplement. Continue regular eye exams with Dr. Judson Hernández. In addition, an annual Medicare wellness visit was done today. Patient understands recommendations and agrees with plan. Follow-up in 12 months.

## 2019-10-15 RX ORDER — ATORVASTATIN CALCIUM 20 MG/1
TABLET, FILM COATED ORAL
Qty: 90 TAB | Refills: 4 | Status: SHIPPED | OUTPATIENT
Start: 2019-10-15 | End: 2020-12-20

## 2020-03-31 ENCOUNTER — TELEPHONE (OUTPATIENT)
Dept: INTERNAL MEDICINE CLINIC | Age: 80
End: 2020-03-31

## 2020-03-31 ENCOUNTER — HOSPITAL ENCOUNTER (OUTPATIENT)
Dept: LAB | Age: 80
Discharge: HOME | End: 2020-03-31
Payer: MEDICARE

## 2020-03-31 ENCOUNTER — APPOINTMENT (OUTPATIENT)
Dept: INTERNAL MEDICINE CLINIC | Age: 80
End: 2020-03-31

## 2020-03-31 DIAGNOSIS — K57.30 DIVERTICULOSIS OF LARGE INTESTINE WITHOUT HEMORRHAGE: ICD-10-CM

## 2020-03-31 DIAGNOSIS — R73.01 IMPAIRED FASTING GLUCOSE: ICD-10-CM

## 2020-03-31 DIAGNOSIS — Z12.5 ENCOUNTER FOR SCREENING FOR MALIGNANT NEOPLASM OF PROSTATE: ICD-10-CM

## 2020-03-31 DIAGNOSIS — K57.92 DIVERTICULITIS: ICD-10-CM

## 2020-03-31 DIAGNOSIS — I10 ESSENTIAL HYPERTENSION: ICD-10-CM

## 2020-03-31 DIAGNOSIS — E78.5 HYPERLIPIDEMIA, UNSPECIFIED HYPERLIPIDEMIA TYPE: ICD-10-CM

## 2020-03-31 LAB
25(OH)D3 SERPL-MCNC: 42.2 NG/ML (ref 30–100)
ALBUMIN SERPL-MCNC: 3.8 G/DL (ref 3.4–5)
ALBUMIN/GLOB SERPL: 1.4 {RATIO} (ref 0.8–1.7)
ALP SERPL-CCNC: 69 U/L (ref 45–117)
ALT SERPL-CCNC: 29 U/L (ref 16–61)
ANION GAP SERPL CALC-SCNC: 7 MMOL/L (ref 3–18)
APPEARANCE UR: CLEAR
AST SERPL-CCNC: 17 U/L (ref 10–38)
BASOPHILS # BLD: 0 K/UL (ref 0–0.1)
BASOPHILS NFR BLD: 0 % (ref 0–2)
BILIRUB SERPL-MCNC: 1.2 MG/DL (ref 0.2–1)
BILIRUB UR QL: NEGATIVE
BUN SERPL-MCNC: 12 MG/DL (ref 7–18)
BUN/CREAT SERPL: 13 (ref 12–20)
CALCIUM SERPL-MCNC: 8.7 MG/DL (ref 8.5–10.1)
CHLORIDE SERPL-SCNC: 111 MMOL/L (ref 100–111)
CHOLEST SERPL-MCNC: 159 MG/DL
CO2 SERPL-SCNC: 26 MMOL/L (ref 21–32)
COLOR UR: YELLOW
CREAT SERPL-MCNC: 0.96 MG/DL (ref 0.6–1.3)
DIFFERENTIAL METHOD BLD: ABNORMAL
EOSINOPHIL # BLD: 0.1 K/UL (ref 0–0.4)
EOSINOPHIL NFR BLD: 2 % (ref 0–5)
ERYTHROCYTE [DISTWIDTH] IN BLOOD BY AUTOMATED COUNT: 13.9 % (ref 11.6–14.5)
EST. AVERAGE GLUCOSE BLD GHB EST-MCNC: 108 MG/DL
GLOBULIN SER CALC-MCNC: 2.7 G/DL (ref 2–4)
GLUCOSE SERPL-MCNC: 96 MG/DL (ref 74–99)
GLUCOSE UR STRIP.AUTO-MCNC: NEGATIVE MG/DL
HBA1C MFR BLD: 5.4 % (ref 4.2–5.6)
HCT VFR BLD AUTO: 45.2 % (ref 36–48)
HDLC SERPL-MCNC: 41 MG/DL (ref 40–60)
HDLC SERPL: 3.9 {RATIO} (ref 0–5)
HGB BLD-MCNC: 15.2 G/DL (ref 13–16)
HGB UR QL STRIP: NEGATIVE
KETONES UR QL STRIP.AUTO: NEGATIVE MG/DL
LDLC SERPL CALC-MCNC: 100.6 MG/DL (ref 0–100)
LEUKOCYTE ESTERASE UR QL STRIP.AUTO: NEGATIVE
LIPID PROFILE,FLP: ABNORMAL
LYMPHOCYTES # BLD: 0.9 K/UL (ref 0.9–3.6)
LYMPHOCYTES NFR BLD: 19 % (ref 21–52)
MCH RBC QN AUTO: 30.6 PG (ref 24–34)
MCHC RBC AUTO-ENTMCNC: 33.6 G/DL (ref 31–37)
MCV RBC AUTO: 90.9 FL (ref 74–97)
MONOCYTES # BLD: 0.4 K/UL (ref 0.05–1.2)
MONOCYTES NFR BLD: 10 % (ref 3–10)
NEUTS SEG # BLD: 3.1 K/UL (ref 1.8–8)
NEUTS SEG NFR BLD: 69 % (ref 40–73)
NITRITE UR QL STRIP.AUTO: NEGATIVE
PH UR STRIP: 6 [PH] (ref 5–8)
PLATELET # BLD AUTO: 190 K/UL (ref 135–420)
PMV BLD AUTO: 9.8 FL (ref 9.2–11.8)
POTASSIUM SERPL-SCNC: 4.1 MMOL/L (ref 3.5–5.5)
PROT SERPL-MCNC: 6.5 G/DL (ref 6.4–8.2)
PROT UR STRIP-MCNC: NEGATIVE MG/DL
PSA SERPL-MCNC: 1 NG/ML (ref 0–4)
RBC # BLD AUTO: 4.97 M/UL (ref 4.7–5.5)
SODIUM SERPL-SCNC: 144 MMOL/L (ref 136–145)
SP GR UR REFRACTOMETRY: 1.02 (ref 1–1.03)
TRIGL SERPL-MCNC: 87 MG/DL (ref ?–150)
TSH SERPL DL<=0.05 MIU/L-ACNC: 2.24 UIU/ML (ref 0.36–3.74)
UROBILINOGEN UR QL STRIP.AUTO: 1 EU/DL (ref 0.2–1)
VLDLC SERPL CALC-MCNC: 17.4 MG/DL
WBC # BLD AUTO: 4.5 K/UL (ref 4.6–13.2)

## 2020-03-31 PROCEDURE — 84153 ASSAY OF PSA TOTAL: CPT

## 2020-03-31 PROCEDURE — 36415 COLL VENOUS BLD VENIPUNCTURE: CPT

## 2020-03-31 PROCEDURE — 82306 VITAMIN D 25 HYDROXY: CPT

## 2020-03-31 PROCEDURE — 81003 URINALYSIS AUTO W/O SCOPE: CPT

## 2020-03-31 PROCEDURE — 83036 HEMOGLOBIN GLYCOSYLATED A1C: CPT

## 2020-03-31 PROCEDURE — 84443 ASSAY THYROID STIM HORMONE: CPT

## 2020-03-31 PROCEDURE — 80053 COMPREHEN METABOLIC PANEL: CPT

## 2020-03-31 PROCEDURE — 80061 LIPID PANEL: CPT

## 2020-03-31 PROCEDURE — 85025 COMPLETE CBC W/AUTO DIFF WBC: CPT

## 2020-04-01 NOTE — TELEPHONE ENCOUNTER
Please schedule virtual visit with patient to discuss labs drawn today. He doesn't seem to have anything scheduled.

## 2020-04-07 ENCOUNTER — VIRTUAL VISIT (OUTPATIENT)
Dept: INTERNAL MEDICINE CLINIC | Age: 80
End: 2020-04-07

## 2020-04-07 ENCOUNTER — TELEPHONE (OUTPATIENT)
Dept: INTERNAL MEDICINE CLINIC | Age: 80
End: 2020-04-07

## 2020-04-07 DIAGNOSIS — R73.01 IMPAIRED FASTING GLUCOSE: ICD-10-CM

## 2020-04-07 DIAGNOSIS — I10 ESSENTIAL HYPERTENSION: Primary | ICD-10-CM

## 2020-04-07 DIAGNOSIS — K57.92 DIVERTICULITIS: ICD-10-CM

## 2020-04-07 DIAGNOSIS — E78.5 HYPERLIPIDEMIA, UNSPECIFIED HYPERLIPIDEMIA TYPE: ICD-10-CM

## 2020-04-07 DIAGNOSIS — M25.561 ACUTE PAIN OF RIGHT KNEE: ICD-10-CM

## 2020-04-07 NOTE — TELEPHONE ENCOUNTER
Please schedule patient for appointment this summer for physical exam and Medicare wellness. No labs needed. Thank you.

## 2020-04-07 NOTE — PROGRESS NOTES
Xiao Renteria is a 78 y.o. male evaluated via telephone on 4/7/2020. Consent:  He and/or health care decision maker is aware that that he may receive a bill for this telephone service, depending on his insurance coverage, and has provided verbal consent to proceed: Yes      Documentation:  I communicated with the patient and/or health care decision maker about his current status and lab results. He reports that he is doing well. He reports that he developed right knee pain while navigating steps three months ago, and has been performing strengthening exercises and stretches with improvement. He states that he was taking Advil, but stopped with the recent pandemic. No longer taking anything for discomfort. Discussed lab results and informed of normal CBC, CMP, HbA1c, UA, PSA, TSH, Vitamin D. Lipid panel with  and HDL 41 on atorvastatin. Discussed that abdominal pain is no longer an issue and has discontinue omeprazole. Blood pressure this morning 125/81 and weight 164 pounds. Details of this discussion including any medical advice provided: Advised:  1. Watch diet more closely to limit saturated fats. 2. May use Tylenol or Tylenol arthritis for right knee pain. 3. Counseled patient regarding strict mitigation measures for COVID-19, including social distancing and diligent hand washing, as recommended by the CDC. Will schedule follow-up in 3-4 months to perform physical exam and MWV. I affirm this is a Patient Initiated Episode with an Established Patient who has not had a related appointment within my department in the past 7 days or scheduled within the next 24 hours.     Total Time: minutes: 11-20 minutes    Note: not billable if this call serves to triage the patient into an appointment for the relevant concern      Issac Vuong MD

## 2020-04-09 ENCOUNTER — TELEPHONE (OUTPATIENT)
Dept: INTERNAL MEDICINE CLINIC | Age: 80
End: 2020-04-09

## 2020-04-09 NOTE — TELEPHONE ENCOUNTER
Called and spoke with patient. Reports that feet slipped while walking down stairs last night, and he landed on his buttocks, sliding down 3-4 steps. He states that he did not realize he hit his arm until he noted swelling over his elbow. He describes it as fluid filled and soft, and states that there is no redness or warmth. He describes minimal if any pain, and no ecchymoses evident. No fever or chills. No head injury. Advised to continue to ice and use ibuprofen if needed. Discussed that likely olecranon bursitis related to injury, and advised to call back if becomes warm, red, painful, or develops fever or chills, which could be indicative of infection. Discussed that will otherwise likely resolve on its own.

## 2020-04-09 NOTE — TELEPHONE ENCOUNTER
Patient fell down the steps last night. Stating a bump the size of a golf ball popped up on his elbow this morning. He is icing it now. Wants to know if he should be concerned. Any other recommendations? Does he need to be seen?

## 2020-07-03 ENCOUNTER — TELEPHONE (OUTPATIENT)
Dept: INTERNAL MEDICINE CLINIC | Age: 80
End: 2020-07-03

## 2020-07-03 RX ORDER — AMOXICILLIN 500 MG/1
500 CAPSULE ORAL 3 TIMES DAILY
Qty: 30 CAP | Refills: 0 | Status: SHIPPED | OUTPATIENT
Start: 2020-07-03 | End: 2020-07-13

## 2020-07-03 NOTE — TELEPHONE ENCOUNTER
4-5 weeks pressure in face, intermittent nasal congestion, slight dizziness off and on;  Zyrtec not helping. No fever, /81 today. Recommend Sudafed and will sent Rx for amoxacillin.     RRM 10:00 7/3/20

## 2020-07-03 NOTE — TELEPHONE ENCOUNTER
The patient called July 3, 2020 at about 9:30 in the morning. He complains of a 4 to 5-week history of some nasal stuffiness, intermittent facial fullness, intermittent slight dizziness and he is pretty sure he has a sinus infection. Zyrtec has not helped. I recommended he use Sudafed intermittently as needed and told him I would call in a prescription for amoxicillin even though it is far from clear that he has a bacterial infection. Cezar Mcdonough MD FACP    Please note: This document has been produced using voice recognition software. Unrecognized errors in transcription may be present.

## 2020-07-05 ENCOUNTER — HOSPITAL ENCOUNTER (EMERGENCY)
Age: 80
Discharge: HOME | End: 2020-07-05
Attending: EMERGENCY MEDICINE
Payer: MEDICARE

## 2020-07-05 ENCOUNTER — APPOINTMENT (OUTPATIENT)
Dept: GENERAL RADIOLOGY | Age: 80
End: 2020-07-05
Attending: EMERGENCY MEDICINE
Payer: MEDICARE

## 2020-07-05 VITALS
DIASTOLIC BLOOD PRESSURE: 88 MMHG | HEIGHT: 65 IN | SYSTOLIC BLOOD PRESSURE: 125 MMHG | RESPIRATION RATE: 11 BRPM | HEART RATE: 61 BPM | WEIGHT: 163 LBS | OXYGEN SATURATION: 99 % | TEMPERATURE: 98.1 F | BODY MASS INDEX: 27.16 KG/M2

## 2020-07-05 DIAGNOSIS — R53.1 GENERALIZED WEAKNESS: Primary | ICD-10-CM

## 2020-07-05 LAB
ANION GAP SERPL CALC-SCNC: 6 MMOL/L (ref 3–18)
ATRIAL RATE: 63 BPM
BASOPHILS # BLD: 0 K/UL (ref 0–0.1)
BASOPHILS NFR BLD: 0 % (ref 0–2)
BNP SERPL-MCNC: 101 PG/ML (ref 0–1800)
BUN SERPL-MCNC: 11 MG/DL (ref 7–18)
BUN/CREAT SERPL: 12 (ref 12–20)
CALCIUM SERPL-MCNC: 9 MG/DL (ref 8.5–10.1)
CALCULATED P AXIS, ECG09: 73 DEGREES
CALCULATED R AXIS, ECG10: 59 DEGREES
CALCULATED T AXIS, ECG11: 53 DEGREES
CHLORIDE SERPL-SCNC: 109 MMOL/L (ref 100–111)
CO2 SERPL-SCNC: 25 MMOL/L (ref 21–32)
CREAT SERPL-MCNC: 0.94 MG/DL (ref 0.6–1.3)
DIAGNOSIS, 93000: NORMAL
DIFFERENTIAL METHOD BLD: ABNORMAL
EOSINOPHIL # BLD: 0.1 K/UL (ref 0–0.4)
EOSINOPHIL NFR BLD: 1 % (ref 0–5)
ERYTHROCYTE [DISTWIDTH] IN BLOOD BY AUTOMATED COUNT: 13.6 % (ref 11.6–14.5)
GLUCOSE SERPL-MCNC: 106 MG/DL (ref 74–99)
HCT VFR BLD AUTO: 45.8 % (ref 36–48)
HGB BLD-MCNC: 15.8 G/DL (ref 13–16)
LYMPHOCYTES # BLD: 1.2 K/UL (ref 0.9–3.6)
LYMPHOCYTES NFR BLD: 20 % (ref 21–52)
MCH RBC QN AUTO: 31 PG (ref 24–34)
MCHC RBC AUTO-ENTMCNC: 34.5 G/DL (ref 31–37)
MCV RBC AUTO: 89.8 FL (ref 74–97)
MONOCYTES # BLD: 0.4 K/UL (ref 0.05–1.2)
MONOCYTES NFR BLD: 7 % (ref 3–10)
NEUTS SEG # BLD: 4 K/UL (ref 1.8–8)
NEUTS SEG NFR BLD: 72 % (ref 40–73)
P-R INTERVAL, ECG05: 176 MS
PLATELET # BLD AUTO: 210 K/UL (ref 135–420)
PMV BLD AUTO: 9.1 FL (ref 9.2–11.8)
POTASSIUM SERPL-SCNC: 3.8 MMOL/L (ref 3.5–5.5)
Q-T INTERVAL, ECG07: 462 MS
QRS DURATION, ECG06: 88 MS
QTC CALCULATION (BEZET), ECG08: 472 MS
RBC # BLD AUTO: 5.1 M/UL (ref 4.7–5.5)
SODIUM SERPL-SCNC: 140 MMOL/L (ref 136–145)
TROPONIN I SERPL-MCNC: <0.02 NG/ML (ref 0–0.04)
VENTRICULAR RATE, ECG03: 63 BPM
WBC # BLD AUTO: 5.7 K/UL (ref 4.6–13.2)

## 2020-07-05 PROCEDURE — 93005 ELECTROCARDIOGRAM TRACING: CPT

## 2020-07-05 PROCEDURE — 85025 COMPLETE CBC W/AUTO DIFF WBC: CPT

## 2020-07-05 PROCEDURE — 71046 X-RAY EXAM CHEST 2 VIEWS: CPT

## 2020-07-05 PROCEDURE — 84484 ASSAY OF TROPONIN QUANT: CPT

## 2020-07-05 PROCEDURE — 83880 ASSAY OF NATRIURETIC PEPTIDE: CPT

## 2020-07-05 PROCEDURE — 80048 BASIC METABOLIC PNL TOTAL CA: CPT

## 2020-07-05 PROCEDURE — 99284 EMERGENCY DEPT VISIT MOD MDM: CPT

## 2020-07-05 NOTE — DISCHARGE INSTRUCTIONS
Patient Education        Weakness: Care Instructions  Your Care Instructions     Weakness is a lack of physical or muscle strength. You may feel that you need to make extra effort to move your arms, legs, or other muscles. Generalized weakness means that you feel weak in most areas of your body. Another type of weakness may affect just one muscle or group of muscles. You may feel weak and tired after you have done too much activity, such as taking an extra-long hike. This is not a serious problem. It often goes away on its own. Feeling weak can also be caused by medical conditions like thyroid problems, depression, or a virus. Sometimes the cause can be serious. Your doctor may want to do more tests to try to find the cause of the weakness. The doctor has checked you carefully, but problems can develop later. If you notice any problems or new symptoms, get medical treatment right away. Follow-up care is a key part of your treatment and safety. Be sure to make and go to all appointments, and call your doctor if you are having problems. It's also a good idea to know your test results and keep a list of the medicines you take. How can you care for yourself at home? · Rest when you feel tired. · Be safe with medicines. If your doctor prescribed medicine, take it exactly as prescribed. Call your doctor if you think you are having a problem with your medicine. You will get more details on the specific medicines your doctor prescribes. · Do not skip meals. Eating a balanced diet may increase your energy level. · Get some physical activity every day, but do not get too tired. When should you call for help? Call your doctor now or seek immediate medical care if:  · You have new or worse weakness. · You are dizzy or lightheaded, or you feel like you may faint. Watch closely for changes in your health, and be sure to contact your doctor if:  · You do not get better as expected. Where can you learn more?   Go to http://ann marie-alexander.info/  Enter V492 in the search box to learn more about \"Weakness: Care Instructions. \"  Current as of: June 26, 2019               Content Version: 12.5  © 7649-5557 [employer]. Care instructions adapted under license by [employer] (which disclaims liability or warranty for this information). If you have questions about a medical condition or this instruction, always ask your healthcare professional. [address] any warranty or liability for your use of this information.

## 2020-07-05 NOTE — ED TRIAGE NOTES
Pt c/o stuffy head, and feels like there is a band around his head that has been causing dizziness for 3 weeks. Also gets SOB with exertion. Called his doctor and was put on Amoxicillin for sinus infection on 7/3. Pt denies any pain and does not appear to be in any distress at this time.  Pt has hx of tinnitus which has gotten worse

## 2020-07-05 NOTE — ED PROVIDER NOTES
HPI patient complains of 3+ week history of weakness malaise and episodic shortness of breath. He denies any specific aches or pains. He says he just seems to be tired and lethargic recently. He decided to come to the emergency room today because he \"was worried about my heart\". He denies any specific chest pain. He denies any abdominal pain nausea vomiting or changes in bowel or bladder habits. He denies any past history of similar type symptoms. He admits that his weakness and malaise \"may be due to anxiety\". No further specifics given at this time.     Past Medical History:   Diagnosis Date    Abnormal glucose     Calculus of kidney     Colon polyps     Diverticulitis     Diverticulosis of colon 2016    Colonoscopy (3/2016) advanced left sided diverticulosis    Essential hypertension 3/25/2017    Hyperlipidemia        Past Surgical History:   Procedure Laterality Date    ENDOSCOPY, COLON, DIAGNOSTIC      2011, Dr. Aubrey Schaumann, colon polyps, was going yearly    HX BLEPHAROPLASTY      HX CATARACT REMOVAL  2012    both eyes    HX OTHER SURGICAL      cn    [place] COLONOSCOPY W/BIOPSY SINGLE/MULTIPLE  3-4-16    Dr. Marsha Bhardwaj         Family History:   Problem Relation Age of Onset    Arthritis-osteo Mother     Elevated Lipids Mother     Heart Disease Father        Social History     Socioeconomic History    Marital status:      Spouse name: Not on file    Number of children: Not on file    Years of education: Not on file    Highest education level: Not on file   Occupational History    Not on file   Social Needs    Financial resource strain: Not on file    Food insecurity     Worry: Not on file     Inability: Not on file    Transportation needs     Medical: Not on file     Non-medical: Not on file   Tobacco Use    Smoking status: Former Smoker     Last attempt to quit: 1988     Years since quittin.6    Smokeless tobacco: Never Used   Substance and Sexual Activity    Alcohol use: Yes     Alcohol/week: 1.0 standard drinks     Types: 1 Glasses of wine per week     Comment: glass of wine/week    Drug use: No    Sexual activity: Yes     Partners: Female   Lifestyle    Physical activity     Days per week: Not on file     Minutes per session: Not on file    Stress: Not on file   Relationships    Social connections     Talks on phone: Not on file     Gets together: Not on file     Attends [employer] service: Not on file     Active member of club or organization: Not on file     Attends meetings of clubs or organizations: Not on file     Relationship status: Not on file    Intimate partner violence     Fear of current or ex partner: Not on file     Emotionally abused: Not on file     Physically abused: Not on file     Forced sexual activity: Not on file   Other Topics Concern    Not on file   Social History Narrative    Not on file         ALLERGIES: Patient has no known allergies. Review of Systems   Constitutional: Positive for fatigue. HENT: Negative. Eyes: Negative. Respiratory: Positive for shortness of breath. Cardiovascular: Negative. Gastrointestinal: Negative. Genitourinary: Negative. Musculoskeletal: Negative. Skin: Negative. Neurological: Negative. Psychiatric/Behavioral: Negative. Vitals:    07/05/20 1018 07/05/20 1029   BP: (!) 178/101    Pulse: 63    Resp: 20    Temp:  98.1 °F (36.7 °C)   SpO2: 100%    Weight: 73.9 kg (163 lb)    Height: 5' 5\" (1.651 m)             Physical Exam  Vitals signs and nursing note reviewed. Constitutional:       Appearance: He is well-developed. HENT:      Head: Normocephalic and atraumatic. Eyes:      Pupils: Pupils are equal, round, and reactive to light. Neck:      Musculoskeletal: Neck supple. Cardiovascular:      Rate and Rhythm: Normal rate and regular rhythm. Pulmonary:      Effort: Pulmonary effort is normal.      Breath sounds: Normal breath sounds.    Abdominal:      Palpations: Abdomen is soft. Musculoskeletal: Normal range of motion. Skin:     General: Skin is warm and dry. Neurological:      Mental Status: He is alert and oriented to person, place, and time. MDM     EKG was read by myself at 10:19 AM showing normal sinus rhythm at a rate of 63 with no acute changes. Procedures        Reviewed the results of the ER evaluation with the patient. He will follow-up with his primary care physician for further evaluation.   Patient understands and agrees with the disposition follow-up plan.  , Amelia Bravo MD 11:55 AM

## 2020-07-08 ENCOUNTER — TELEPHONE (OUTPATIENT)
Dept: INTERNAL MEDICINE CLINIC | Age: 80
End: 2020-07-08

## 2020-07-08 ENCOUNTER — OFFICE VISIT (OUTPATIENT)
Dept: INTERNAL MEDICINE CLINIC | Age: 80
End: 2020-07-08

## 2020-07-08 VITALS
WEIGHT: 165 LBS | TEMPERATURE: 97.5 F | OXYGEN SATURATION: 99 % | HEIGHT: 65 IN | DIASTOLIC BLOOD PRESSURE: 81 MMHG | SYSTOLIC BLOOD PRESSURE: 130 MMHG | HEART RATE: 58 BPM | BODY MASS INDEX: 27.49 KG/M2 | RESPIRATION RATE: 16 BRPM

## 2020-07-08 DIAGNOSIS — E78.5 HYPERLIPIDEMIA, UNSPECIFIED HYPERLIPIDEMIA TYPE: ICD-10-CM

## 2020-07-08 DIAGNOSIS — Z12.11 SCREEN FOR COLON CANCER: ICD-10-CM

## 2020-07-08 DIAGNOSIS — I10 ESSENTIAL HYPERTENSION: ICD-10-CM

## 2020-07-08 DIAGNOSIS — Z71.89 ACP (ADVANCE CARE PLANNING): ICD-10-CM

## 2020-07-08 DIAGNOSIS — E55.9 VITAMIN D DEFICIENCY, UNSPECIFIED: ICD-10-CM

## 2020-07-08 DIAGNOSIS — K57.92 DIVERTICULITIS: ICD-10-CM

## 2020-07-08 DIAGNOSIS — R73.01 IMPAIRED FASTING GLUCOSE: ICD-10-CM

## 2020-07-08 DIAGNOSIS — K57.30 DIVERTICULOSIS OF LARGE INTESTINE WITHOUT HEMORRHAGE: ICD-10-CM

## 2020-07-08 DIAGNOSIS — Z13.39 SCREENING FOR ALCOHOLISM: ICD-10-CM

## 2020-07-08 DIAGNOSIS — Z00.00 MEDICARE ANNUAL WELLNESS VISIT, SUBSEQUENT: ICD-10-CM

## 2020-07-08 DIAGNOSIS — Z13.31 SCREENING FOR DEPRESSION: ICD-10-CM

## 2020-07-08 DIAGNOSIS — H81.20 VESTIBULAR NEURONITIS, UNSPECIFIED LATERALITY: Primary | ICD-10-CM

## 2020-07-08 DIAGNOSIS — Z86.39 HISTORY OF VITAMIN D DEFICIENCY: ICD-10-CM

## 2020-07-08 RX ORDER — MECLIZINE HYDROCHLORIDE 25 MG/1
25 TABLET ORAL
Qty: 30 TAB | Refills: 0 | Status: SHIPPED | OUTPATIENT
Start: 2020-07-08 | End: 2020-07-18

## 2020-07-08 NOTE — PATIENT INSTRUCTIONS
Vertigo: Care Instructions Your Care Instructions Vertigo is the feeling that you or your surroundings are moving when there is no actual movement. It is often described as a feeling of spinning, whirling, falling, or tilting. Vertigo may make you vomit or feel nauseated. You may have trouble standing or walking and may lose your balance. Vertigo is often related to an inner ear problem, but it can have other more serious causes. If vertigo continues, you may need more tests to find its cause. Follow-up care is a key part of your treatment and safety. Be sure to make and go to all appointments, and call your doctor if you are having problems. It's also a good idea to know your test results and keep a list of the medicines you take. How can you care for yourself at home? · Do not lie flat on your back. Prop yourself up slightly. This may reduce the spinning feeling. Keep your eyes open. · Move slowly so that you do not fall. · If your doctor recommends medicine, take it exactly as directed. · Do not drive while you are having vertigo. Certain exercises, called Villagran-Daroff exercises, can help decrease vertigo. To do Villagran-Daroff exercises: · Sit on the edge of a bed or sofa and quickly lie down on the side that causes the worst vertigo. Lie on your side with your ear down. · Stay in this position for at least 30 seconds or until the vertigo goes away. · Sit up. If this causes vertigo, wait for it to stop. · Repeat the procedure on the other side. · Repeat this 10 times. Do these exercises 2 times a day until the vertigo is gone. When should you call for help? [id number] anytime you think you may need emergency care. For example, call if: 
· You passed out (lost consciousness). · You have symptoms of a stroke. These may include: 
? Sudden numbness, tingling, weakness, or loss of movement in your face, arm, or leg, especially on only one side of your body. ? Sudden vision changes. ? Sudden trouble speaking. ? Sudden confusion or trouble understanding simple statements. ? Sudden problems with walking or balance. ? A sudden, severe headache that is different from past headaches. Call your doctor now or seek immediate medical care if: · Vertigo occurs with a fever, a headache, or ringing in your ears. · You have new or increased nausea and vomiting. Watch closely for changes in your health, and be sure to contact your doctor if: · Vertigo gets worse or happens more often. · Vertigo has not gotten better after 2 weeks. Where can you learn more? Go to http://ann marieAncancoalexander.info/ Enter N273 in the search box to learn more about \"Vertigo: Care Instructions. \" Current as of: July 29, 2019               Content Version: 12.5 © 5613-8326 [employer]. Care instructions adapted under license by [employer] (which disclaims liability or warranty for this information). If you have questions about a medical condition or this instruction, always ask your healthcare professional. [place] [address] any warranty or liability for your use of this information. Vertigo: Exercises Introduction Here are some examples of exercises for you to try. The exercises may be suggested for a condition or for rehabilitation. Start each exercise slowly. Ease off the exercises if you start to have pain. You will be told when to start these exercises and which ones will work best for you. How to do the exercises Exercise 1 1. Stand with a chair in front of you and a wall behind you. If you begin to fall, you may use them for support. 2. Stand with your feet together and your arms at your sides. 3. Move your head up and down 10 times. Exercise 2 1. Move your head side to side 10 times. Exercise 3 1. Move your head diagonally up and down 10 times. Exercise 4 1. Move your head diagonally up and down 10 times on the other side. Follow-up care is a key part of your treatment and safety. Be sure to make and go to all appointments, and call your doctor if you are having problems. It's also a good idea to know your test results and keep a list of the medicines you take. Where can you learn more? Go to http://ann marie-alexander.info/ Enter F349 in the search box to learn more about \"Vertigo: Exercises. \" Current as of: July 29, 2019               Content Version: 12.5 © 5420-4402 [employer]. Care instructions adapted under license by [employer] (which disclaims liability or warranty for this information). If you have questions about a medical condition or this instruction, always ask your healthcare professional. [address] any warranty or liability for your use of this information. Epley Maneuver for Vertigo: Exercises Your Care Instructions The Epley Maneuver is a series of movements your doctor may use to treat your vertigo. Here are the steps for the exercises. Your doctor or physical therapist will guide you through the movements. A single 10- to 15-minute session often is all that's needed. Crystal debris (canaliths) cause the vertigo. When your head is moved into different positions, the debris moves freely. This may cause your symptoms to stop. How to do the exercises Step 1  
1. You will sit on the doctor's exam table. Your legs will be out in front of you. The doctor or physical therapist will turn your head so that it is custodial between looking straight ahead and looking to the side that causes the worst vertigo. 2. Without changing your head position, he or she will guide you back quickly. Your shoulders will be on the table. Your head will hang over the edge of the table. At this point, the side of your head that is causing the worst vertigo will face the floor. You'll stay in this position for 30 seconds or until your symptoms stop. Step 2 1. Then, the doctor or physical therapist will turn your head to the other side. You don't need to lift your head. The other side of your head will face the floor. You will stay in this position for 30 seconds or until your symptoms stop. Step 3 1. The doctor or physical therapist will help you roll your body in the same direction that your head is facing. You will lie on your side. (For example, if you are looking to your right, you will roll onto your right side.) The side that causes the worst symptoms should be facing up. You'll stay in this position for another 30 seconds or until your symptoms stop. Step 4  
1. The doctor or physical therapist will then help you to sit back up. Your legs will hang off the table on the same side that you were facing. Follow-up care is a key part of your treatment and safety. Be sure to make and go to all appointments, and call your doctor if you are having problems. It's also a good idea to know your test results and keep a list of the medicines you take. Where can you learn more? Go to http://ann marie-alexander.info/ Enter Y419 in the search box to learn more about \"Epley Maneuver for Vertigo: Exercises. \" Current as of: July 29, 2019               Content Version: 12.5 © 1454-0227 [employer]. Care instructions adapted under license by [employer] (which disclaims liability or warranty for this information). If you have questions about a medical condition or this instruction, always ask your healthcare professional. [place] [address] any warranty or liability for your use of this information. Medicare Wellness Visit, Male The best way to improve and maintain good health is to have a healthy lifestyle by eating a well-balanced diet, exercising regularly, limiting alcohol and stopping smoking.  
 
Regular visits with your physician or non-physician health care provider also support your good health. Preventive screening tests can find health problems before they become diseases or illnesses. Preventive services such as immunizations prevent serious infections. All people over age 72 should have a Pneumovax and a Prevnar-13 shot to prevent potentially life threatening infections with the pneumococcus bacteria, a common cause of pneumonia. These are once in a lifetime unless you and your provider decide differently. All people over 65 should have a yearly influenza vaccine or \"flu\" shot. This does not prevent infection with cold viruses but has been proven to prevent hospitalization and death from influenza. Although Medicare part B \"regular Medicare\" currently only covers tetanus vaccination in the context of an injury, a tetanus vaccine (Tdap or Td) is recommended every 10 years. A shingles vaccine is recommended once in a lifetime after age 61. The Shingles vaccine is also not covered by Medicare part B. Note, however, that both the Shingles vaccine and Tdap/Td are generally covered by secondary carriers. Please check your coverage and out of pocket expenses. Consider contacting your local health department because it may stock these vaccines for a reasonable charge. We currently have documentation of the following immunization history for you: 
Immunization History Administered Date(s) Administered  (RETIRED) Pneumococcal Vaccine (Unspecified Type) 01/01/2005  Influenza High Dose Vaccine PF 11/01/2013, 11/02/2016, 09/23/2017, 10/02/2018  Influenza Vaccine 10/31/2014, 10/11/2015  Influenza Vaccine Split 10/17/2011, 10/23/2012  Influenza Vaccine Whole 10/12/2010  Pneumococcal Conjugate (PCV-13) 01/05/2015  Pneumococcal Polysaccharide (PPSV-23) 01/01/2005  TD Vaccine 01/01/2009  Tdap 11/07/2017  Zoster 01/01/2009 Screening for infection with Hepatitis C is recommended for anyone born between [birth year] through [address]. The table at the bottom of this document indicates the status of this and other screening services. Screening for diabetes mellitus with a blood sugar test (glucose) should be done at least every 3 years until age 79. You and your health care provider may decide whether to continue screening after age 79. The most recent blood glucose we have on file for you is:  
Lab Results Component Value Date/Time Glucose 106 (H) 07/05/2020 10:45 AM  
 
 
Glaucoma is a disease of the eye due to increased ocular pressure that can lead to blindness. People with risk factors for glaucoma ( race, diabetes, family history) should be screened at least every 2 years by an eye professional. This may be covered annually if indicated as determined by you and your doctor. Cardiovascular screening tests that check for elevated lipids or cholesterol (fatty part of blood) which can lead to heart disease and strokes should be done every 4-6 years through age 79. You and your health care provider may decide whether to continue screening after age 79. The most recent lipid panel we have on file for you is:  
Lab Results Component Value Date/Time Cholesterol, total 159 03/31/2020 08:32 AM  
 HDL Cholesterol 41 03/31/2020 08:32 AM  
 LDL, calculated 100.6 (H) 03/31/2020 08:32 AM  
 VLDL, calculated 17.4 03/31/2020 08:32 AM  
 Triglyceride 87 03/31/2020 08:32 AM  
 CHOL/HDL Ratio 3.9 03/31/2020 08:32 AM  
 
 
Colorectal cancer screening that evaluates for blood or polyps in your colon for people with average risk should be done yearly as a stool test, every five years as a flexible sigmoidoscope or every 10 years as a colonoscopy up to age 76. You and your health care provider may decide whether to continue screening after age 76.  
 
Men up to age 76 may elect to screen for prostate cancer with a blood test called a PSA at certain intervals, depending on their personal and family history. This decision is between the patient and his provider. The most recent PSA values we have on file for you are: 
Lab Results Component Value Date/Time  
 Prostate Specific Ag 1.0 03/31/2020 08:32 AM  
 Prostate Specific Ag 1.4 03/26/2019 08:32 AM  
 Prostate Specific Ag 1.2 03/20/2018 09:20 AM  
 Prostate Specific Ag 1.5 11/24/2014 08:18 AM  
 Prostate Specific Ag 1.8 10/23/2013 07:55 AM  
 Prostate Specific Ag 2.4 10/15/2012 03:20 PM  
 
 
If you have been a smoker or had family history of abdominal aortic aneurysms, you and your provider may decide to schedule an ultrasound test of your aorta. Our records show this was done on:  n/a People who have smoked the equivalent of 1 pack per day for 30 years or more may benefit from screening for lung cancer with a yearly low dose CT scan until they have been non smokers for 15 years or competing health conditions render this unlikely to be beneficial. Our records show: n/a Your Medicare Wellness Exam is recommended annually. Here is a list of your current Health Maintenance items with a due date: 
Health Maintenance Topic Date Due  Shingrix Vaccine Age 50> (1 of 2) 04/27/1990  Pneumococcal 65+ years (2 of 2 - PPSV23) 01/05/2016  GLAUCOMA SCREENING Q2Y  03/16/2020  Influenza Age 5 to Adult  08/01/2020  Lipid Screen  03/31/2021  Medicare Yearly Exam  07/09/2021  
 DTaP/Tdap/Td series (2 - Td) 11/07/2027 Medicare Wellness Visit, Male The best way to live healthy is to have a lifestyle where you eat a well-balanced diet, exercise regularly, limit alcohol use, and quit all forms of tobacco/nicotine, if applicable. Regular preventive services are another way to keep healthy. Preventive services (vaccines, screening tests, monitoring & exams) can help personalize your care plan, which helps you manage your own care. Screening tests can find health problems at the earliest stages, when they are easiest to treat. [facility] follows the current, evidence-based guidelines published by the [facility] States Matty Saxena ([facility]) when recommending preventive services for our patients. Because we follow these guidelines, sometimes recommendations change over time as research supports it. (For example, a prostate screening blood test is no longer routinely recommended for men with no symptoms). Of course, you and your doctor may decide to screen more often for some diseases, based on your risk and co-morbidities (chronic disease you are already diagnosed with). Preventive services for you include: - Medicare offers their members a free annual wellness visit, which is time for you and your primary care provider to discuss and plan for your preventive service needs. Take advantage of this benefit every year! 
-All adults over age 72 should receive the recommended pneumonia vaccines. Current USPSTF guidelines recommend a series of two vaccines for the best pneumonia protection.  
-All adults should have a flu vaccine yearly and tetanus vaccine every 10 years. 
-All adults age 48 and older should receive the shingles vaccines (series of two vaccines). -All adults age 38-68 who are overweight should have a diabetes screening test once every three years.  
-Other screening tests & preventive services for persons with diabetes include: an eye exam to screen for diabetic retinopathy, a kidney function test, a foot exam, and stricter control over your cholesterol.  
-Cardiovascular screening for adults with routine risk involves an electrocardiogram (ECG) at intervals determined by the provider.  
-Colorectal cancer screening should be done for adults age 54-65 with no increased risk factors for colorectal cancer. There are a number of acceptable methods of screening for this type of cancer. Each test has its own benefits and drawbacks.  Discuss with your provider what is most appropriate for you during your annual wellness visit. The different tests include: colonoscopy (considered the best screening method), a fecal occult blood test, a fecal DNA test, and sigmoidoscopy. 
-All adults born between [facility] should be screened once for Hepatitis C. 
-An Abdominal Aortic Aneurysm (AAA) Screening is recommended for men age 73-68 who has ever smoked in their lifetime. Here is a list of your current Health Maintenance items (your personalized list of preventive services) with a due date: 
Health Maintenance Due Topic Date Due  Shingles Vaccine (1 of 2) 04/27/1990  Pneumococcal Vaccine (2 of 2 - PPSV23) 01/05/2016  Glaucoma Screening   03/16/2020

## 2020-07-08 NOTE — ACP (ADVANCE CARE PLANNING)
Advance Care Planning       Advance Care Planning (ACP) Physician/NP/PA (Provider) Conversation        Date of ACP Conversation: 7/8/2020    Conversation Conducted with:   Patient with Decision Making [address] Maker:    Current Designated Health Care Decision Maker:   (If there is a [address] named in the [address]" box in the ACP activity, but it is not visible above, be sure to open that field and then select the health care decision maker relationship (ie \"primary\") in the blank space to the right of the name.)    Note: Assess and validate information in current ACP documents, as indicated. If no Authorized Decision Maker has previously been identified, then patient chooses [place]:  \"Who would you like to name as your primary health care decision-maker? \"    Name: Daniela George   Relationship: wife Phone number: [phone]  Cristela Sherman this person be reached easily? \" YES  \"Who would you like to name as your back-up decision maker? \"   Name: Esteban Anne  Relationship: daughter Phone number: [phone]  Cristela Sherman this person be reached easily? \" YES    Note: If the relationship of these Decision-Makers to the patient does NOT follow your state's Next of Kin hierarchy, recommend that patient complete ACP document that meets state-specific requirements to allow them to act on the patient's behalf when appropriate. Care Preferences:    Hospitalization: \"If your health worsens and it becomes clear that your chance of recovery is unlikely, what would your preference be regarding hospitalization? \"  If the patient would want hospitalization, answer \"yes\". If the patient would prefer comfort-focused treatment without hospitalization, answer \"no\". yes      Ventilation:   \"If you were in your present state of health and suddenly became very ill and were unable to breathe on your own, what would your preference be about the use of a ventilator (breathing machine) if it was available to you? \"    If patient would desire the use of a ventilator (breathing machine), answer \"yes\", if not answer \"no\":yes    \"If your health worsens and it becomes clear that your chance of recovery is unlikely, what would your preference be about the use of a ventilator (breathing machine) if it was available to you? \"   yes      Resuscitation:  \"CPR works best to restart the heart when there is a sudden event, like a heart attack, in someone who is otherwise healthy. Unfortunately, CPR does not typically restart the heart for people who have serious health conditions or who are very sick. \"    \"In the event your heart stopped as a result of an underlying serious health condition, would you want attempts to be made to restart your heart (answer \"yes\" for attempt to resuscitate) or would you prefer a natural death (answer \"no\" for do not attempt to resuscitate)? \"   no    NOTE: If the patient has a valid advance directive AND provides care preference(s) that are inconsistent with that prior directive, advise the patient to consider either: creating a new advance directive that complies with state-specific requirements; or, if that is not possible, orally revoking that prior directive in accordance with state-specific requirements, which must be documented in the EHR. Conversation Outcomes / Follow-Up Plan: Patient has an existing advance directive on file, signed 3/27/2018 . It designates his wife and daughter  as his healthcare agents and expresses that he does not wish life prolonging procedures for end of life care. It was reviewed with him and still reflects her wishes. However, with regard to shaan COVID-19, he states that he would wish the interventions as outlined above.               Length of Voluntary ACP Conversation in minutes:  16 minutes      Gabo Yang MD

## 2020-07-08 NOTE — TELEPHONE ENCOUNTER
Please request recent eye exam from Dr. Randall Nice . Patient reports being seen in 3/2020 . Thank you.

## 2020-07-08 NOTE — PROGRESS NOTES
This is the Subsequent Medicare Annual Wellness Exam, performed 12 months or more after the Initial AWV or the last Subsequent AWV    I have reviewed the patient's medical history in detail and updated the computerized patient record. History     Patient Active Problem List   Diagnosis Code    Hyperlipidemia E78.5    Diverticulitis, Recurrent K57.92    Colon polyps K63.5    Onychomycosis B35.1    Impaired fasting glucose R73.01    Diverticulosis of large intestine without hemorrhage K57.30    Essential hypertension I10    Acute pain of right knee M25.561    Vestibular neuronitis H81.20    History of vitamin D deficiency Z86.39     Past Medical History:   Diagnosis Date    Abnormal glucose     Calculus of kidney     Colon polyps     Diverticulitis     Diverticulosis of colon 03/2016    Colonoscopy (3/2016) advanced left sided diverticulosis    Essential hypertension 3/25/2017    Hyperlipidemia       Past Surgical History:   Procedure Laterality Date    ENDOSCOPY, COLON, DIAGNOSTIC      Jan 2011, Dr. Hilario Garibay, colon polyps, was going yearly    HX BLEPHAROPLASTY      HX CATARACT REMOVAL  4/2012    both eyes    HX OTHER SURGICAL      cn    [place] COLONOSCOPY W/BIOPSY SINGLE/MULTIPLE  3-4-16    Dr. Steve Klein     Current Outpatient Medications   Medication Sig Dispense Refill    meclizine (ANTIVERT) 25 mg tablet Take 1 Tab by mouth three (3) times daily as needed for Dizziness for up to 10 days. 30 Tab 0    amoxicillin (AMOXIL) 500 mg capsule Take 1 Cap by mouth three (3) times daily for 10 days. 30 Cap 0    atorvastatin (LIPITOR) 20 mg tablet TAKE 1 TABLET DAILY 90 Tab 4    cholecalciferol, vitamin D3, (VITAMIN D3) 2,000 unit tab Take 2,000 Units by mouth daily.        No Known Allergies    Family History   Problem Relation Age of Onset    Arthritis-osteo Mother     Elevated Lipids Mother     Heart Disease Father      Social History     Tobacco Use    Smoking status: Former Smoker     Last attempt to quit: 1988     Years since quittin.6    Smokeless tobacco: Never Used   Substance Use Topics    Alcohol use: Yes     Alcohol/week: 1.0 standard drinks     Types: 1 Glasses of wine per week     Comment: glass of wine/week       Depression Risk Factor Screening:     3 most recent PHQ Screens 2020   Little interest or pleasure in doing things Not at all   Feeling down, depressed, irritable, or hopeless Not at all   Total Score PHQ 2 0       Alcohol Risk Factor Screening (MALE > 65): Do you average more 1 drink per night or more than 7 drinks a week: No    In the past three months have you have had more than 4 drinks containing alcohol on one occasion: No      Functional Ability and Level of Safety:   Hearing: Hearing is good. The patient wears hearing aids. Activities of Daily Living: The home contains: no safety equipment. Patient does total self care     Ambulation: with no difficulty     Fall Risk:  Fall Risk Assessment, last 12 mths 2020   Able to walk? Yes   Fall in past 12 months? Yes   Fall with injury? Yes   Number of falls in past 12 months 1   Fall Risk Score 2     Abuse Screen:  Patient is not abused       Cognitive Screening   Has your family/caregiver stated any concerns about your memory: no     Cognitive Screening: none performed    Patient Care Team   Patient Care Team:  Preston Montemayor MD as PCP - General (Internal Medicine)  Preston Montemayor MD as PCP - REHABILITATION [facility]  Vianey Chilel MD (Ophthalmology)  Cecilia Hwang MD (Plastic Surgery)    Assessment/Plan   Education and counseling provided:  Are appropriate based on today's review and evaluation  End-of-Life planning (with patient's consent)  Influenza Vaccine  Colorectal cancer screening tests  Cardiovascular screening blood test  Screening for glaucoma  Diabetes screening test  Shingrix vaccine    Diagnoses and all orders for this visit:    1.  Vestibular neuronitis, unspecified laterality  -     CBC WITH AUTOMATED DIFF; Future  -     HEMOGLOBIN A1C WITH EAG; Future  -     LIPID PANEL; Future  -     MAGNESIUM; Future  -     METABOLIC PANEL, COMPREHENSIVE; Future  -     MICROALBUMIN, UR, RAND W/ MICROALB/CREAT RATIO; Future  -     TSH 3RD GENERATION; Future  -     URINALYSIS W/MICROSCOPIC; Future  -     VITAMIN D, 25 HYDROXY; Future    2. Essential hypertension  -     CBC WITH AUTOMATED DIFF; Future  -     HEMOGLOBIN A1C WITH EAG; Future  -     LIPID PANEL; Future  -     MAGNESIUM; Future  -     METABOLIC PANEL, COMPREHENSIVE; Future  -     MICROALBUMIN, UR, RAND W/ MICROALB/CREAT RATIO; Future  -     TSH 3RD GENERATION; Future  -     URINALYSIS W/MICROSCOPIC; Future  -     VITAMIN D, 25 HYDROXY; Future    3. Hyperlipidemia, unspecified hyperlipidemia type  -     CBC WITH AUTOMATED DIFF; Future  -     HEMOGLOBIN A1C WITH EAG; Future  -     LIPID PANEL; Future  -     MAGNESIUM; Future  -     METABOLIC PANEL, COMPREHENSIVE; Future  -     MICROALBUMIN, UR, RAND W/ MICROALB/CREAT RATIO; Future  -     TSH 3RD GENERATION; Future  -     URINALYSIS W/MICROSCOPIC; Future  -     VITAMIN D, 25 HYDROXY; Future    4. Impaired fasting glucose  -     CBC WITH AUTOMATED DIFF; Future  -     HEMOGLOBIN A1C WITH EAG; Future  -     LIPID PANEL; Future  -     MAGNESIUM; Future  -     METABOLIC PANEL, COMPREHENSIVE; Future  -     MICROALBUMIN, UR, RAND W/ MICROALB/CREAT RATIO; Future  -     TSH 3RD GENERATION; Future  -     URINALYSIS W/MICROSCOPIC; Future  -     VITAMIN D, 25 HYDROXY; Future    5. Diverticulosis of large intestine without hemorrhage  -     CBC WITH AUTOMATED DIFF; Future  -     HEMOGLOBIN A1C WITH EAG; Future  -     LIPID PANEL; Future  -     MAGNESIUM; Future  -     METABOLIC PANEL, COMPREHENSIVE; Future  -     MICROALBUMIN, UR, RAND W/ MICROALB/CREAT RATIO; Future  -     TSH 3RD GENERATION; Future  -     URINALYSIS W/MICROSCOPIC; Future  -     VITAMIN D, 25 HYDROXY; Future    6. Diverticulitis, Recurrent  -     CBC WITH AUTOMATED DIFF; Future  -     HEMOGLOBIN A1C WITH EAG; Future  -     LIPID PANEL; Future  -     MAGNESIUM; Future  -     METABOLIC PANEL, COMPREHENSIVE; Future  -     MICROALBUMIN, UR, RAND W/ MICROALB/CREAT RATIO; Future  -     TSH 3RD GENERATION; Future  -     URINALYSIS W/MICROSCOPIC; Future  -     VITAMIN D, 25 HYDROXY; Future    7. Medicare annual wellness visit, subsequent    8. Screening for alcoholism  -     SD ANNUAL ALCOHOL SCREEN 15 MIN    9. Screening for depression  -     DEPRESSION SCREEN ANNUAL    10. Screen for colon cancer    11. ACP (advance care planning)    12. History of vitamin D deficiency  -     CBC WITH AUTOMATED DIFF; Future  -     HEMOGLOBIN A1C WITH EAG; Future  -     LIPID PANEL; Future  -     MAGNESIUM; Future  -     METABOLIC PANEL, COMPREHENSIVE; Future  -     MICROALBUMIN, UR, RAND W/ MICROALB/CREAT RATIO; Future  -     TSH 3RD GENERATION; Future  -     URINALYSIS W/MICROSCOPIC; Future  -     VITAMIN D, 25 HYDROXY; Future    13. Vitamin D deficiency, unspecified   -     VITAMIN D, 25 HYDROXY; Future    Other orders  -     meclizine (ANTIVERT) 25 mg tablet; Take 1 Tab by mouth three (3) times daily as needed for Dizziness for up to 10 days.         Health Maintenance Due   Topic Date Due    Shingrix Vaccine Age 49> (1 of 2) 04/27/1990    Pneumococcal 65+ years (2 of 2 - PPSV23) 01/05/2016    GLAUCOMA SCREENING Q2Y  03/16/2020

## 2020-07-12 PROBLEM — Z86.39 HISTORY OF VITAMIN D DEFICIENCY: Status: ACTIVE | Noted: 2020-07-12

## 2020-07-12 PROBLEM — H81.20 VESTIBULAR NEURONITIS: Status: ACTIVE | Noted: 2020-07-12

## 2020-07-12 NOTE — PROGRESS NOTES
HPI:   Rasta Mueller is a [de-identified]y.o. year old male who presents today for post-ED follow-up. He has a history of hypertension, hyperlipidemia, abnormal fasting glucose, and diverticulosis. He reports that over the last 3-4 weeks, he has noted headache, fatigue, increasing tinnitus, and lightheadedness with nausea. He also reports intermittent sinus drainage, but denies any fever, chills, cough, or dyspnea. On 7/3/2020, he contacted on call Dr. Bro Gonzalez and complained of nasal congestion with intermittent dizziness. He was instructed to use Sudafed, and was prescribed amoxicillin. However, he became concerned that his dizziness became worse, and presented to the [facility] ED on 7/5/2020. AN EKG was performed, showing sinus rhythm at 63 bpm, normal intervals, and without ischemic changes. Labs showed Hb 15.8/ Hct 45.8, WBC 5.7, creatinine 0.94/ eGFR >60, K 3.8, troponin negative, andNT pro-. He was discharged and presents today for follow-up. He describes his dizziness as a \"spinning sensation\", and admits to worsening tinnitus and nausea. He also describes clenching his teeth often, and acknowledges that he is particularly worried due to the pandemic. He wears hearing aids every day and does not describe worsening hearing. He denies any fever or chills, and does not feel that the antibiotics have had any effect thus far. He continues to exercise every morning. He is otherwise without new complaints. He has a history of hypertension, not requiring treatment with medication. He states that he monitors his blood pressure only occasionally at home, and reports most readings 120-130/ 70-80. He exercises regularly, going to the gym three times per week doing cardio for 30 minutes and light weights. He denies any chest pain, shortness of breath, lightheadedness, palpitations, edema, PND, or orthopnea. He also has a history of hyperlipidemia, treated with moderate intensity does atorvastatin.  He was evaluated by PA David Mcwilliams on 2/1/2019 for complaint of episodes of indigestion accompanied by dyspnea and mild dizziness. He denied any typical chest pain, palpitations, edema, orthopnea, or signs of thrombosis. He also denied an association with exertion, continuing to exercise on treadmill for 1.5 mile without symptoms. Evaluation included EKG showing sinus bradycardia at 54 bpm and no ischemic changes. CBC and CMP were normal. He underwent a pharmacologic nuclear stress test (2/6/2019) which was a negative, low risk study without evidence of ischemia or prior infarction and normal LV function (EF 79%). He has a history of diverticulosis, and has had multiple episodes of diverticulitis and the past. He had a screening colonoscopy in 3/2016 by Dr. Adal Huang which showed advanced left-sided diverticulosis, and polyps in the cecum and ascending colon (pathology: tubular adenomas). Follow up recommended for five years. In 2/2019, he presented with left sided abdominal pain, similar to prior episodes of diverticulitis, so he was prescribed Cipro and Flagyl for 10 days. He was seen again on 3/12/2019 for persistent left sided abdominal pain, but denied any fever, chills, nausea, vomiting, melena, or hematochezia. He had lost nine pounds over the last year by watching his diet, and did not report decreased appetite and fatigue. He underwent an abdominal CT scan (3/19/2019) which showed no acute findings to explain his symptoms, significant distal colonic diverticulosis without diverticulitis, and an infrarenal AAA showing mild increase in caliber from 2.5 to 2.9 cm since a CT scan in 2010. He also was restarted on omeprazole with significant improvement. He denies any abdominal pain, nausea, vomiting, melena, hematochezia, or change in bowel habits. He has a history of abnormal glucose, with fasting blood sugar ranging from 102-106 from [id number] with a HbA1c of 5.8 at that time.  Since 2014, however, his blood sugar and HbA1c have been in the normal range. He reports that this has improved since he is no longer drinking soda and has been watching his diet and weight. He has a regular eye exams with Dr. Compa Jackson. He denies any polyuria, polydipsia, nocturia, or blurry vision, and has no history of retinopathy, neuropathy, or nephropathy. Past Medical History:   Diagnosis Date    Abnormal glucose     Calculus of kidney     Colon polyps     Diverticulitis     Diverticulosis of colon 2016    Colonoscopy (3/2016) advanced left sided diverticulosis    Essential hypertension 3/25/2017    Hyperlipidemia      Past Surgical History:   Procedure Laterality Date    ENDOSCOPY, COLON, DIAGNOSTIC      2011, Dr. Lesley Rizvi, colon polyps, was going yearly    HX BLEPHAROPLASTY      HX CATARACT REMOVAL  2012    both eyes    HX OTHER SURGICAL      cn    CA COLONOSCOPY W/BIOPSY SINGLE/MULTIPLE  3-4-16    Dr. Scooby Gardner     Current Outpatient Medications   Medication Sig    meclizine (ANTIVERT) 25 mg tablet Take 1 Tab by mouth three (3) times daily as needed for Dizziness for up to 10 days.  amoxicillin (AMOXIL) 500 mg capsule Take 1 Cap by mouth three (3) times daily for 10 days.  atorvastatin (LIPITOR) 20 mg tablet TAKE 1 TABLET DAILY    cholecalciferol, vitamin D3, (VITAMIN D3) 2,000 unit tab Take 2,000 Units by mouth daily. No current facility-administered medications for this visit. Allergies and Intolerances:   No Known Allergies     Family History: His father  had CABG and  from an MI. His mother  at the age of 80. He has no family history of colon or prostate cancer. Family History   Problem Relation Age of Onset    Arthritis-osteo Mother     Elevated Lipids Mother     Heart Disease Father         Social History:   He  reports that he quit smoking about 31 years ago. He has never used smokeless tobacco. He smoked 1 ppd for 15 years, stopping in .  He is  and has one daughter, Zamzam Thompson. He is a retired [employer]  and is self employed as an . Social History     Substance and Sexual Activity   Alcohol Use Yes    Alcohol/week: 1.0 standard drinks    Types: 1 Glasses of wine per week    Comment: glass of wine/week     Immunization History:  Immunization History   Administered Date(s) Administered    (RETIRED) Pneumococcal Vaccine (Unspecified Type) 01/01/2005    Influenza High Dose Vaccine PF 11/01/2013, 11/02/2016, 09/23/2017, 10/02/2018    Influenza Vaccine 10/31/2014, 10/11/2015    Influenza Vaccine Split 10/17/2011, 10/23/2012    Influenza Vaccine Whole 10/12/2010    Pneumococcal Conjugate (PCV-13) 01/05/2015    Pneumococcal Polysaccharide (PPSV-23) 01/01/2005    TD Vaccine 01/01/2009    Tdap 11/07/2017    Zoster 01/01/2009       Review of Systems:   As above included in HPI. Otherwise 11 point review of systems negative including constitutional, skin, HENT, eyes, respiratory, cardiovascular, gastrointestinal, genitourinary, musculoskeletal, endocrine, hematologic, allergy, and neurologic. Physical:   Vitals:   BP: 130/81  HR: (!) 58  WT: 165 lb (74.8 kg)  BMI:  27.46 kg/m2    Exam:   Patient appears in no apparent distress. Affect is appropriate. HEENT --Anicteric sclerae, tympanic membranes normal,  ear canals normal.  PERRL, EOMI, conjunctiva and lids normal.   Sinuses were nontender, turbinates normal, hearing normal.  Oropharynx without  erythema, normal tongue, oral mucosa and tonsils. No cervical lymphadenopathy. No thyromegaly, JVD, or bruits. Carotid pulses 2+ with normal upstroke. Lungs --Clear to auscultation. No wheezing or rales. Heart --Regular rate and rhythm, no murmurs, rubs, gallops, or clicks. Abdomen -- Soft and nontender, no hepatosplenomegaly or masses. Extremities -- Without  edema.  Normal looking digits, ROM intact    Review of Data:  Labs:  Admission on 07/05/2020, Discharged on 07/05/2020   Component Date Value Ref Range Status    Ventricular Rate 07/05/2020 63  BPM Final    Atrial Rate 07/05/2020 63  BPM Final    P-R Interval 07/05/2020 176  ms Final    QRS Duration 07/05/2020 88  ms Final    Q-T Interval 07/05/2020 462  ms Final    QTC Calculation (Bezet) 07/05/2020 472  ms Final    Calculated P Axis 07/05/2020 73  degrees Final    Calculated R Axis 07/05/2020 59  degrees Final    Calculated T Axis 07/05/2020 53  degrees Final    Diagnosis 07/05/2020    Final                    Value:Normal sinus rhythm  Septal infarct (cited on or before 06-FEB-2019)  Abnormal ECG  When compared with ECG of 06-FEB-2019 10:31,  Nonspecific T wave abnormality no longer evident in Inferior leads  Confirmed by Sergio Talbot ([id number]) on 7/5/2020 9:36:32 PM      WBC 07/05/2020 5.7  4.6 - 13.2 K/uL Final    RBC 07/05/2020 5.10  4.70 - 5.50 M/uL Final    HGB 07/05/2020 15.8  13.0 - 16.0 g/dL Final    HCT 07/05/2020 45.8  36.0 - 48.0 % Final    MCV 07/05/2020 89.8  74.0 - 97.0 FL Final    MCH 07/05/2020 31.0  24.0 - 34.0 PG Final    MCHC 07/05/2020 34.5  31.0 - 37.0 g/dL Final    RDW 07/05/2020 13.6  11.6 - 14.5 % Final    PLATELET 98/56/9929 221  135 - 420 K/uL Final    MPV 07/05/2020 9.1* 9.2 - 11.8 FL Final    NEUTROPHILS 07/05/2020 72  40 - 73 % Final    LYMPHOCYTES 07/05/2020 20* 21 - 52 % Final    MONOCYTES 07/05/2020 7  3 - 10 % Final    EOSINOPHILS 07/05/2020 1  0 - 5 % Final    BASOPHILS 07/05/2020 0  0 - 2 % Final    ABS. NEUTROPHILS 07/05/2020 4.0  1.8 - 8.0 K/UL Final    ABS. LYMPHOCYTES 07/05/2020 1.2  0.9 - 3.6 K/UL Final    ABS. MONOCYTES 07/05/2020 0.4  0.05 - 1.2 K/UL Final    ABS. EOSINOPHILS 07/05/2020 0.1  0.0 - 0.4 K/UL Final    ABS.  BASOPHILS 07/05/2020 0.0  0.0 - 0.1 K/UL Final    DF 07/05/2020 AUTOMATED    Final    Sodium 07/05/2020 140  136 - 145 mmol/L Final    Potassium 07/05/2020 3.8  3.5 - 5.5 mmol/L Final    Chloride 07/05/2020 109  100 - 111 mmol/L Final    CO2 07/05/2020 25  21 - 32 mmol/L Final    Anion gap 07/05/2020 6  3.0 - 18 mmol/L Final    Glucose 07/05/2020 106* 74 - 99 mg/dL Final    BUN 07/05/2020 11  7.0 - 18 MG/DL Final    Creatinine 07/05/2020 0.94  0.6 - 1.3 MG/DL Final    BUN/Creatinine ratio 07/05/2020 12  12 - 20   Final    GFR est AA 07/05/2020 >60  >60 ml/min/1.73m2 Final    GFR est non-AA 07/05/2020 >60  >60 ml/min/1.73m2 Final    Calcium 07/05/2020 9.0  8.5 - 10.1 MG/DL Final    Troponin-I, QT 07/05/2020 <0.02  0.0 - 0.045 NG/ML Final    NT pro-BNP 07/05/2020 101  0 - 1,800 PG/ML Final       Health Maintenance:  Screening:    Colorectal: colonoscopy (3/2016) tubular adenomas. Dr. Jaylon Gurrola. Due 2021. Depression: none   DM (HbA1c/FPG): FPG 96/ HbA1c 5.4 (3/2020)   Hepatitis C: N/A   Falls: none   DEXA: N/A   PSA/SHANTELL: PSA 1.0 (3/2020)   Glaucoma: regular eye exams with Dr. 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 Vertigo. Patient describes worsening sinus drainage, tinnitus, and dizziness with nausea over the last several weeks. Description consistent with vertigo, and likely vestibular neuritis given worsening tinnitus during this time. Advised to increase fluid intake and will prescribe meclizine to use as needed. Discussed options of ENT evaluation +/- referral for balance therapy, but patient wishing to hold off at this time due to the pandemic. Advised to complete course of amoxicillin as has a few days left. Will give handout of Epley maneuver and exercises. Will call for worsening symptoms. 2. Hypertension. Essentially well controlled without medication. Reports well controlled at home,ranging 120-133/68-72. Renal function remains normal with creatinine 0.94/ eGFR >60. Continue to follow. 3. Hyperlipidemia. On moderate intensity dose atorvastatin 20 mg daily with  and HDL 41, indicative of reasonable control. Emphasized importance of continued lifestyle modifications, including diet, exercise, and weight loss. 4. Abnormal fasting glucose. Fasting glucose and HbA1c mildly elevated in past. However, decreased seven pounds and has been maintaining weight over last year, and both remain normalized. Discussed importance of continued lifestyle modifications, including diet, exercise, and weight loss. Will continue to follow. 5. Diverticulosis. Extensive left sided disease on colonoscopy. Had not had episode of diverticulitis in over 5 years, and symptoms in 3/2019 not likely related to diverticulitis given lack of improvement with antibiotics. Continue high fiber diet and follow. 6. Health maintenance. Already received influenza vaccine. Also received Tdap. Shingrix vaccine expensive so holding off for now. Other immunizations up to date. Colonoscopy due 2021. Prostate cancer screening up to date. Patient wishing to continue following PSA. Vitamin D level now in normal range. Continue supplement. Continue regular eye exams with Dr. Adal Matos. In addition, an annual Medicare wellness visit was done today. Patient understands recommendations and agrees with plan. Follow-up in 12 months.

## 2020-12-20 RX ORDER — ATORVASTATIN CALCIUM 20 MG/1
TABLET, FILM COATED ORAL
Qty: 90 TAB | Refills: 3 | Status: SHIPPED | OUTPATIENT
Start: 2020-12-20 | End: 2021-11-26

## 2021-04-02 ENCOUNTER — APPOINTMENT (OUTPATIENT)
Dept: INTERNAL MEDICINE CLINIC | Age: 81
End: 2021-04-02

## 2021-04-02 ENCOUNTER — HOSPITAL ENCOUNTER (OUTPATIENT)
Dept: LAB | Age: 81
Discharge: HOME | End: 2021-04-02
Payer: MEDICARE

## 2021-04-02 ENCOUNTER — TELEPHONE (OUTPATIENT)
Dept: INTERNAL MEDICINE CLINIC | Age: 81
End: 2021-04-02

## 2021-04-02 DIAGNOSIS — H81.20 VESTIBULAR NEURONITIS, UNSPECIFIED LATERALITY: ICD-10-CM

## 2021-04-02 DIAGNOSIS — E78.5 HYPERLIPIDEMIA, UNSPECIFIED HYPERLIPIDEMIA TYPE: ICD-10-CM

## 2021-04-02 DIAGNOSIS — I10 ESSENTIAL HYPERTENSION: ICD-10-CM

## 2021-04-02 DIAGNOSIS — R73.01 IMPAIRED FASTING GLUCOSE: ICD-10-CM

## 2021-04-02 DIAGNOSIS — K57.30 DIVERTICULOSIS OF LARGE INTESTINE WITHOUT HEMORRHAGE: ICD-10-CM

## 2021-04-02 DIAGNOSIS — E55.9 VITAMIN D DEFICIENCY, UNSPECIFIED: ICD-10-CM

## 2021-04-02 DIAGNOSIS — Z86.39 HISTORY OF VITAMIN D DEFICIENCY: ICD-10-CM

## 2021-04-02 DIAGNOSIS — K57.92 DIVERTICULITIS: ICD-10-CM

## 2021-04-02 DIAGNOSIS — R35.0 URINARY FREQUENCY: Primary | ICD-10-CM

## 2021-04-02 LAB
25(OH)D3 SERPL-MCNC: 35 NG/ML (ref 30–100)
ALBUMIN SERPL-MCNC: 3.8 G/DL (ref 3.4–5)
ALBUMIN/GLOB SERPL: 1.3 {RATIO} (ref 0.8–1.7)
ALP SERPL-CCNC: 82 U/L (ref 45–117)
ALT SERPL-CCNC: 29 U/L (ref 16–61)
ANION GAP SERPL CALC-SCNC: 7 MMOL/L (ref 3–18)
APPEARANCE UR: CLEAR
AST SERPL-CCNC: 21 U/L (ref 10–38)
BACTERIA URNS QL MICRO: ABNORMAL /HPF
BASOPHILS # BLD: 0 K/UL (ref 0–0.1)
BASOPHILS NFR BLD: 0 % (ref 0–2)
BILIRUB SERPL-MCNC: 1.8 MG/DL (ref 0.2–1)
BILIRUB UR QL: NEGATIVE
BUN SERPL-MCNC: 8 MG/DL (ref 7–18)
BUN/CREAT SERPL: 10 (ref 12–20)
CALCIUM SERPL-MCNC: 9.1 MG/DL (ref 8.5–10.1)
CHLORIDE SERPL-SCNC: 112 MMOL/L (ref 100–111)
CHOLEST SERPL-MCNC: 150 MG/DL
CO2 SERPL-SCNC: 26 MMOL/L (ref 21–32)
COLOR UR: YELLOW
CREAT SERPL-MCNC: 0.82 MG/DL (ref 0.6–1.3)
CREAT UR-MCNC: 203 MG/DL (ref 30–125)
DIFFERENTIAL METHOD BLD: ABNORMAL
EOSINOPHIL # BLD: 0.1 K/UL (ref 0–0.4)
EOSINOPHIL NFR BLD: 1 % (ref 0–5)
ERYTHROCYTE [DISTWIDTH] IN BLOOD BY AUTOMATED COUNT: 13.6 % (ref 11.6–14.5)
EST. AVERAGE GLUCOSE BLD GHB EST-MCNC: 108 MG/DL
GLOBULIN SER CALC-MCNC: 2.9 G/DL (ref 2–4)
GLUCOSE SERPL-MCNC: 93 MG/DL (ref 74–99)
GLUCOSE UR STRIP.AUTO-MCNC: NEGATIVE MG/DL
HBA1C MFR BLD: 5.4 % (ref 4.2–5.6)
HCT VFR BLD AUTO: 44.2 % (ref 36–48)
HDLC SERPL-MCNC: 43 MG/DL (ref 40–60)
HDLC SERPL: 3.5 {RATIO} (ref 0–5)
HGB BLD-MCNC: 15 G/DL (ref 13–16)
HGB UR QL STRIP: ABNORMAL
KETONES UR QL STRIP.AUTO: NEGATIVE MG/DL
LDLC SERPL CALC-MCNC: 93.6 MG/DL (ref 0–100)
LEUKOCYTE ESTERASE UR QL STRIP.AUTO: ABNORMAL
LIPID PROFILE,FLP: NORMAL
LYMPHOCYTES # BLD: 1 K/UL (ref 0.9–3.6)
LYMPHOCYTES NFR BLD: 14 % (ref 21–52)
MAGNESIUM SERPL-MCNC: 2 MG/DL (ref 1.6–2.6)
MCH RBC QN AUTO: 31 PG (ref 24–34)
MCHC RBC AUTO-ENTMCNC: 33.9 G/DL (ref 31–37)
MCV RBC AUTO: 91.3 FL (ref 74–97)
MICROALBUMIN UR-MCNC: 4.82 MG/DL (ref 0–3)
MICROALBUMIN/CREAT UR-RTO: 24 MG/G (ref 0–30)
MONOCYTES # BLD: 0.6 K/UL (ref 0.05–1.2)
MONOCYTES NFR BLD: 8 % (ref 3–10)
MUCOUS THREADS URNS QL MICRO: ABNORMAL /LPF
NEUTS SEG # BLD: 5.7 K/UL (ref 1.8–8)
NEUTS SEG NFR BLD: 77 % (ref 40–73)
NITRITE UR QL STRIP.AUTO: NEGATIVE
PH UR STRIP: 5 [PH] (ref 5–8)
PLATELET # BLD AUTO: 231 K/UL (ref 135–420)
PMV BLD AUTO: 10 FL (ref 9.2–11.8)
POTASSIUM SERPL-SCNC: 4.5 MMOL/L (ref 3.5–5.5)
PROT SERPL-MCNC: 6.7 G/DL (ref 6.4–8.2)
PROT UR STRIP-MCNC: NEGATIVE MG/DL
RBC # BLD AUTO: 4.84 M/UL (ref 4.7–5.5)
SODIUM SERPL-SCNC: 145 MMOL/L (ref 136–145)
SP GR UR REFRACTOMETRY: 1.02 (ref 1–1.03)
TRIGL SERPL-MCNC: 67 MG/DL (ref ?–150)
TSH SERPL DL<=0.05 MIU/L-ACNC: 1.33 UIU/ML (ref 0.36–3.74)
UROBILINOGEN UR QL STRIP.AUTO: 0.2 EU/DL (ref 0.2–1)
VLDLC SERPL CALC-MCNC: 13.4 MG/DL
WBC # BLD AUTO: 7.5 K/UL (ref 4.6–13.2)
WBC URNS QL MICRO: ABNORMAL /HPF (ref 0–5)

## 2021-04-02 PROCEDURE — 81001 URINALYSIS AUTO W/SCOPE: CPT

## 2021-04-02 PROCEDURE — 80053 COMPREHEN METABOLIC PANEL: CPT

## 2021-04-02 PROCEDURE — 83036 HEMOGLOBIN GLYCOSYLATED A1C: CPT

## 2021-04-02 PROCEDURE — 80061 LIPID PANEL: CPT

## 2021-04-02 PROCEDURE — 85025 COMPLETE CBC W/AUTO DIFF WBC: CPT

## 2021-04-02 PROCEDURE — 82043 UR ALBUMIN QUANTITATIVE: CPT

## 2021-04-02 PROCEDURE — 84443 ASSAY THYROID STIM HORMONE: CPT

## 2021-04-02 PROCEDURE — 82306 VITAMIN D 25 HYDROXY: CPT

## 2021-04-02 PROCEDURE — 83735 ASSAY OF MAGNESIUM: CPT

## 2021-04-02 RX ORDER — SULFAMETHOXAZOLE AND TRIMETHOPRIM 800; 160 MG/1; MG/1
1 TABLET ORAL 2 TIMES DAILY
Qty: 14 TAB | Refills: 0 | Status: SHIPPED | OUTPATIENT
Start: 2021-04-02 | End: 2021-04-09

## 2021-04-02 NOTE — TELEPHONE ENCOUNTER
Billy to verify if urine specimen was received by the lab and  stated nothing has been received as of yet however we can go ahead and fax order. Spoke with Lab mary  to verify if urine specimen was received and tech stated nothing was received for patient however order can be faxed.

## 2021-04-02 NOTE — TELEPHONE ENCOUNTER
Pt calling, says she was here this morning and did a urine test. Wants to know if that can be checked for a uti? Says he is now having frequency, no burning. Says he did not report this morning. Says this afternoon it seems to have increased.

## 2021-04-02 NOTE — TELEPHONE ENCOUNTER
Urinalysis abnormal with trace blood, moderate LE, 21-35 WBC, and 1+ bacteria. Urine culture has been ordered. Will treat empirically while awaiting culture results. Called and spoke with patient. Reports having only urinary frequency, and denies any fever, abdominal or flank pain. Will begin Bactrim DS 1 po bid x 7 days while awaiting culture results. Script sent to [facility].

## 2021-04-13 ENCOUNTER — OFFICE VISIT (OUTPATIENT)
Dept: INTERNAL MEDICINE CLINIC | Age: 81
End: 2021-04-13
Payer: MEDICARE

## 2021-04-13 DIAGNOSIS — E78.5 HYPERLIPIDEMIA, UNSPECIFIED HYPERLIPIDEMIA TYPE: ICD-10-CM

## 2021-04-13 DIAGNOSIS — E55.9 VITAMIN D DEFICIENCY, UNSPECIFIED: ICD-10-CM

## 2021-04-13 DIAGNOSIS — I10 ESSENTIAL HYPERTENSION: ICD-10-CM

## 2021-04-13 DIAGNOSIS — K57.30 DIVERTICULOSIS OF LARGE INTESTINE WITHOUT HEMORRHAGE: ICD-10-CM

## 2021-04-13 DIAGNOSIS — Z12.11 COLON CANCER SCREENING: ICD-10-CM

## 2021-04-13 DIAGNOSIS — R47.9 TRANSIENT SPEECH DISTURBANCE: Primary | ICD-10-CM

## 2021-04-13 DIAGNOSIS — H81.20 VESTIBULAR NEURONITIS, UNSPECIFIED LATERALITY: ICD-10-CM

## 2021-04-13 DIAGNOSIS — Z86.39 HISTORY OF VITAMIN D DEFICIENCY: ICD-10-CM

## 2021-04-13 DIAGNOSIS — B35.1 ONYCHOMYCOSIS: ICD-10-CM

## 2021-04-13 DIAGNOSIS — R73.01 IMPAIRED FASTING GLUCOSE: ICD-10-CM

## 2021-04-13 DIAGNOSIS — E66.3 OVERWEIGHT (BMI 25.0-29.9): ICD-10-CM

## 2021-04-13 DIAGNOSIS — R42 VERTIGO: ICD-10-CM

## 2021-04-13 DIAGNOSIS — Z12.5 PROSTATE CANCER SCREENING: ICD-10-CM

## 2021-04-13 DIAGNOSIS — J30.89 NON-SEASONAL ALLERGIC RHINITIS, UNSPECIFIED TRIGGER: ICD-10-CM

## 2021-04-13 DIAGNOSIS — R47.01 EXPRESSIVE APHASIA: ICD-10-CM

## 2021-04-13 DIAGNOSIS — Z23 ENCOUNTER FOR IMMUNIZATION: ICD-10-CM

## 2021-04-13 PROCEDURE — G0463 HOSPITAL OUTPT CLINIC VISIT: HCPCS | Performed by: INTERNAL MEDICINE

## 2021-04-13 PROCEDURE — 99215 OFFICE O/P EST HI 40 MIN: CPT | Performed by: INTERNAL MEDICINE

## 2021-04-13 PROCEDURE — 1100F PTFALLS ASSESS-DOCD GE2>/YR: CPT | Performed by: INTERNAL MEDICINE

## 2021-04-13 PROCEDURE — G8510 SCR DEP NEG, NO PLAN REQD: HCPCS | Performed by: INTERNAL MEDICINE

## 2021-04-13 PROCEDURE — 90732 PPSV23 VACC 2 YRS+ SUBQ/IM: CPT | Performed by: INTERNAL MEDICINE

## 2021-04-13 PROCEDURE — G8752 SYS BP LESS 140: HCPCS | Performed by: INTERNAL MEDICINE

## 2021-04-13 PROCEDURE — G8419 CALC BMI OUT NRM PARAM NOF/U: HCPCS | Performed by: INTERNAL MEDICINE

## 2021-04-13 PROCEDURE — 3288F FALL RISK ASSESSMENT DOCD: CPT | Performed by: INTERNAL MEDICINE

## 2021-04-13 PROCEDURE — G8536 NO DOC ELDER MAL SCRN: HCPCS | Performed by: INTERNAL MEDICINE

## 2021-04-13 PROCEDURE — G8427 DOCREV CUR MEDS BY ELIG CLIN: HCPCS | Performed by: INTERNAL MEDICINE

## 2021-04-13 PROCEDURE — G8754 DIAS BP LESS 90: HCPCS | Performed by: INTERNAL MEDICINE

## 2021-04-13 NOTE — PATIENT INSTRUCTIONS
Begin aspirin 81 mg daily. Schedule brain MRI and carotid duplex scan at [phone]. Heart-Healthy Diet: Care Instructions Your Care Instructions A heart-healthy diet has lots of vegetables, fruits, nuts, beans, and whole grains, and is low in salt. It limits foods that are high in saturated fat, such as meats, cheeses, and fried foods. It may be hard to change your diet, but even small changes can lower your risk of heart attack and heart disease. Follow-up care is a key part of your treatment and safety. Be sure to make and go to all appointments, and call your doctor if you are having problems. It's also a good idea to know your test results and keep a list of the medicines you take. How can you care for yourself at home? Watch your portions · Learn what a serving is. A \"serving\" and a \"portion\" are not always the same thing. Make sure that you are not eating larger portions than are recommended. For example, a serving of pasta is ½ cup. A serving size of meat is 2 to 3 ounces. A 3-ounce serving is about the size of a deck of cards. Measure serving sizes until you are good at [place]" them. Keep in mind that restaurants often serve portions that are 2 or 3 times the size of one serving. · To keep your energy level up and keep you from feeling hungry, eat often but in smaller portions. · Eat only the number of calories you need to stay at a healthy weight. If you need to lose weight, eat fewer calories than your body burns (through exercise and other physical activity). Eat more fruits and vegetables · Eat a variety of fruit and vegetables every day. Dark green, deep orange, red, or yellow fruits and vegetables are especially good for you. Examples include spinach, carrots, peaches, and berries. · Keep carrots, celery, and other veggies handy for snacks. Buy fruit that is in season and store it where you can see it so that you will be tempted to eat it.  
· Cook dishes that have a lot of veggies in them, such as stir-fries and soups. Limit saturated and trans fat · Read food labels, and try to avoid saturated and trans fats. They increase your risk of heart disease. · Use olive or canola oil when you cook. · Bake, broil, grill, or steam foods instead of frying them. · Choose lean meats instead of high-fat meats such as hot dogs and sausages. Cut off all visible fat when you prepare meat. · Eat fish, skinless poultry, and meat alternatives such as soy products instead of high-fat meats. Soy products, such as tofu, may be especially good for your heart. · Choose low-fat or fat-free milk and dairy products. Eat foods high in fiber · Eat a variety of grain products every day. Include whole-grain foods that have lots of fiber and nutrients. Examples of whole-grain foods include oats, whole wheat bread, and brown rice. · Buy whole-grain breads and cereals, instead of white bread or pastries. Limit salt and sodium · Limit how much salt and sodium you eat to help lower your blood pressure. · Taste food before you salt it. Add only a little salt when you think you need it. With time, your taste buds will adjust to less salt. · Eat fewer snack items, fast foods, and other high-salt, processed foods. Check food labels for the amount of sodium in packaged foods. · Choose low-sodium versions of canned goods (such as soups, vegetables, and beans). Limit sugar · Limit drinks and foods with added sugar. These include candy, desserts, and soda pop. Limit alcohol · Limit alcohol to no more than 2 drinks a day for men and 1 drink a day for women. Too much alcohol can cause health problems. When should you call for help? Watch closely for changes in your health, and be sure to contact your doctor if: 
  · You would like help planning heart-healthy meals. Where can you learn more? Go to http://www.lockett.com/ Enter V137 in the search box to learn more about \"Heart-Healthy Diet: Care Instructions. \" Current as of: August 22, 2019               Content Version: 12.6 © 4393-9099 [employer]. Care instructions adapted under license by [employer] (which disclaims liability or warranty for this information). If you have questions about a medical condition or this instruction, always ask your healthcare professional. [address] any warranty or liability for your use of this information. High Cholesterol: Care Instructions Your Care Instructions Cholesterol is a type of fat in your blood. It is needed for many body functions, such as making new cells. Cholesterol is made by your body. It also comes from food you eat. High cholesterol means that you have too much of the fat in your blood. This raises your risk of a heart attack and stroke. LDL and HDL are part of your total cholesterol. LDL is the \"bad\" cholesterol. High LDL can raise your risk for heart disease, heart attack, and stroke. HDL is the \"good\" cholesterol. It helps clear bad cholesterol from the body. High HDL is linked with a lower risk of heart disease, heart attack, and stroke. Your cholesterol levels help your doctor find out your risk for having a heart attack or stroke. You and your doctor can talk about whether you need to lower your risk and what treatment is best for you. A heart-healthy lifestyle along with medicines can help lower your cholesterol and your risk. The way you choose to lower your risk will depend on how high your risk is for heart attack and stroke. It will also depend on how you feel about taking medicines. Follow-up care is a key part of your treatment and safety. Be sure to make and go to all appointments, and call your doctor if you are having problems. It's also a good idea to know your test results and keep a list of the medicines you take. How can you care for yourself at home? · Eat a variety of foods every day.  Good choices include fruits, vegetables, whole grains (like oatmeal), dried beans and peas, nuts and seeds, soy products (like tofu), and fat-free or low-fat dairy products. · Replace butter, margarine, and hydrogenated or partially hydrogenated oils with olive and canola oils. (Canola oil margarine without trans fat is fine.) · Replace red meat with fish, poultry, and soy protein (like tofu). · Limit processed and packaged foods like chips, crackers, and cookies. · Bake, broil, or steam foods. Don't ga them. · Be physically active. Get at least 30 minutes of exercise on most days of the week. Walking is a good choice. You also may want to do other activities, such as running, swimming, cycling, or playing tennis or team sports. · Stay at a healthy weight or lose weight by making the changes in eating and physical activity listed above. Losing just a small amount of weight, even 5 to 10 pounds, can reduce your risk for having a heart attack or stroke. · Do not smoke. When should you call for help? Watch closely for changes in your health, and be sure to contact your doctor if: 
  · You need help making lifestyle changes. · You have questions about your medicine. Where can you learn more? Go to http://www.gray.com/ Enter V690 in the search box to learn more about \"High Cholesterol: Care Instructions. \" Current as of: December 16, 2019               Content Version: 12.6 © 2832-1439 [employer]. Care instructions adapted under license by [employer] (which disclaims liability or warranty for this information). If you have questions about a medical condition or this instruction, always ask your healthcare professional. [place] [address] any warranty or liability for your use of this information.

## 2021-04-13 NOTE — PROGRESS NOTES
1. Have you been to the ER, urgent care clinic or hospitalized since your last visit? NO.     2. Have you seen or consulted any other health care providers outside of the [address] [place] [address] since your last visit (Include any pap smears or colon screening)? NO      Sarthak Lou 1940 male who presents for routine immunizations. Patient denies any symptoms , reactions or allergies that would exclude them from being immunized today. Risks and adverse reactions were discussed and the VIS was given to them. All questions were addressed. Order placed for PPSV23 by Isha Vaz- given in left deltoid  Patient was observed for 15 min post injection. There were no reactions observed.     Adriana Craig LPN

## 2021-04-16 VITALS
TEMPERATURE: 97.3 F | RESPIRATION RATE: 16 BRPM | DIASTOLIC BLOOD PRESSURE: 70 MMHG | BODY MASS INDEX: 27.16 KG/M2 | OXYGEN SATURATION: 99 % | HEART RATE: 66 BPM | HEIGHT: 65 IN | SYSTOLIC BLOOD PRESSURE: 124 MMHG | WEIGHT: 163 LBS

## 2021-04-16 PROBLEM — J30.9 ALLERGIC RHINITIS: Status: ACTIVE | Noted: 2021-04-16

## 2021-04-16 PROBLEM — R42 VERTIGO: Status: ACTIVE | Noted: 2021-04-16

## 2021-04-16 PROBLEM — E66.3 OVERWEIGHT (BMI 25.0-29.9): Status: ACTIVE | Noted: 2021-04-16

## 2021-04-16 PROBLEM — R47.9 TRANSIENT SPEECH DISTURBANCE: Status: ACTIVE | Noted: 2021-04-16

## 2021-04-16 NOTE — PROGRESS NOTES
HPI:   Cj Gonzalez is a [de-identified]y.o. year old male who presents today for a physical exam. He has a history of hypertension, hyperlipidemia, abnormal fasting glucose, and diverticulosis. He reports that he is doing reasonably well. He completed the [employer] [facility] COVID-19 vaccine series. He reports an episode in 12/2020 while a passenger in a car where he was unable to speak for 1-2 minutes. He states that he was aware that the episode was occurring, but he could not formulate words or a sentence. He states that following the episode, he returned to his baseline. He denies any visual changes or focal deficits during the episode. He also denies any palpitations or recurrence. He does report having another episode of vertigo and nausea in 3/2021 which lasted for several days. He states that it seemed to improve with meclizine. He does describe worsening allergy symptoms with post-nasal drainage. He also states that he has now resumed exercising at [employer] three days per week. He is otherwise without new complaints and feeling generally well. On 7/3/2020, he contacted on call Dr. Becca Mcmillan and complained of nasal congestion with intermittent dizziness. He was instructed to use Sudafed, and was prescribed amoxicillin. However, he became concerned that his dizziness became worse, and presented to the [facility] ED on 7/5/2020. AN EKG was performed, showing sinus rhythm at 63 bpm, normal intervals, and without ischemic changes. Labs showed Hb 15.8/ Hct 45.8, WBC 5.7, creatinine 0.94/ eGFR >60, K 3.8, troponin negative, and NT pro-. He was discharged. He described his dizziness as a \"spinning sensation\", and admitted to worsening tinnitus and nausea. He was prescribed meclizine with improvement. He has a history of hypertension, not requiring treatment with medication. He states that he monitors his blood pressure only occasionally at home, and reports most readings 120-130/ 70-80.  He exercises regularly, going to the gym three times per week doing cardio for 30 minutes and light weights. He denies any chest pain, shortness of breath, lightheadedness, palpitations, edema, PND, or orthopnea. He also has a history of hyperlipidemia, treated with moderate intensity does atorvastatin. He was evaluated by LORI Bales on 2/1/2019 for complaint of episodes of indigestion accompanied by dyspnea and mild dizziness. He denied any typical chest pain, palpitations, edema, orthopnea, or signs of thrombosis. He also denied an association with exertion, continuing to exercise on treadmill for 1.5 mile without symptoms. Evaluation included EKG showing sinus bradycardia at 54 bpm and no ischemic changes. CBC and CMP were normal. He underwent a pharmacologic nuclear stress test (2/6/2019) which was a negative, low risk study without evidence of ischemia or prior infarction and normal LV function (EF 79%). He has a history of diverticulosis, and has had multiple episodes of diverticulitis and the past. He had a screening colonoscopy in 3/2016 by Dr. Anthony Vasquez which showed advanced left-sided diverticulosis, and polyps in the cecum and ascending colon (pathology: tubular adenomas). Follow up recommended for five years. In 2/2019, he presented with left sided abdominal pain, similar to prior episodes of diverticulitis, so he was prescribed Cipro and Flagyl for 10 days. He was seen again on 3/12/2019 for persistent left sided abdominal pain, but denied any fever, chills, nausea, vomiting, melena, or hematochezia. He had lost nine pounds over the last year by watching his diet, and did not report decreased appetite and fatigue. He underwent an abdominal CT scan (3/19/2019) which showed no acute findings to explain his symptoms, significant distal colonic diverticulosis without diverticulitis, and an infrarenal AAA showing mild increase in caliber from 2.5 to 2.9 cm since a CT scan in 2010.  He also was restarted on omeprazole with significant improvement. He denies any abdominal pain, nausea, vomiting, melena, hematochezia, or change in bowel habits. He has a history of abnormal glucose, with fasting blood sugar ranging from 102-106 from 1800-3622 with a HbA1c of 5.8 at that time. Since , however, his blood sugar and HbA1c have been in the normal range. He reports that this has improved since he is no longer drinking soda and has been watching his diet and weight. He has a regular eye exams with Dr. Amber Retana. He denies any polyuria, polydipsia, nocturia, or blurry vision, and has no history of retinopathy, neuropathy, or nephropathy. Past Medical History:   Diagnosis Date    Abnormal glucose     Calculus of kidney     Colon polyps     Diverticulitis     Diverticulosis of colon 2016    Colonoscopy (3/2016) advanced left sided diverticulosis    Essential hypertension 3/25/2017    Hyperlipidemia      Past Surgical History:   Procedure Laterality Date    ENDOSCOPY, COLON, DIAGNOSTIC      2011, Dr. Citlali Milian, colon polyps, was going yearly    HX BLEPHAROPLASTY      HX CATARACT REMOVAL  2012    both eyes    HX OTHER SURGICAL      cn    [place]ONOSCOPY W/BIOPSY SINGLE/MULTIPLE  3-4-16    Dr. Raulito Seth     Current Outpatient Medications   Medication Sig    efinaconazole (JUBLIA) angeline topical solution Apply one drop to the surface and one drop at the end of affected toenails once daily. Spread with applicator brush. Treat for 48 weeks.  atorvastatin (LIPITOR) 20 mg tablet TAKE 1 TABLET DAILY    cholecalciferol, vitamin D3, (VITAMIN D3) 2,000 unit tab Take 2,000 Units by mouth daily. No current facility-administered medications for this visit. Allergies and Intolerances:   No Known Allergies     Family History: His father  had CABG and  from an MI. His mother  at the age of 80. He has no family history of colon or prostate cancer.   Family History   Problem Relation Age of Onset    Arthritis-osteo Mother    [place] Elevated Lipids Mother     Heart Disease Father         Social History:   He  reports that he quit smoking about 32 years ago. He has never used smokeless tobacco. He smoked 1 ppd for 15 years, stopping in 1988. He is  and has one daughter, Vivian Carter. He is a retired [employer]  and is self employed as an . Social History     Substance and Sexual Activity   Alcohol Use Yes    Alcohol/week: 1.0 standard drinks    Types: 1 Glasses of wine per week    Comment: glass of wine/week     Immunization History:  Immunization History   Administered Date(s) Administered    (RETIRED) Pneumococcal Vaccine (Unspecified Type) 01/01/2005    COVID-19, PFIZER, MRNA, LNP-S, PF, 30MCG/0.3ML DOSE 02/02/2021, 02/23/2021    Influenza High Dose Vaccine PF 11/01/2013, 11/02/2016, 09/23/2017, 10/02/2018    Influenza Vaccine 10/31/2014, 10/11/2015    Influenza Vaccine Split 10/17/2011, 10/23/2012    Influenza Vaccine Whole 10/12/2010    Pneumococcal Conjugate (PCV-13) 01/05/2015    Pneumococcal Polysaccharide (PPSV-23) 01/01/2005, 04/13/2021    TD Vaccine 01/01/2009    Tdap 11/07/2017    Zoster 01/01/2009       Review of Systems:   As above included in HPI. Otherwise 11 point review of systems negative including constitutional, skin, HENT, eyes, respiratory, cardiovascular, gastrointestinal, genitourinary, musculoskeletal, endocrine, hematologic, allergy, and neurologic. Physical:   Visit Vitals  /70   Pulse 66   Temp 97.3 °F (36.3 °C) (Temporal)   Resp 16   Ht 5' 5\" (1.651 m)   Wt 163 lb (73.9 kg)   SpO2 99%   BMI 27.12 kg/m²       Exam:   Patient appears in no apparent distress. Affect is appropriate. HEENT --Anicteric sclerae, tympanic membranes normal,  ear canals normal.  PERRL, EOMI, conjunctiva and lids normal.   Sinuses were nontender, turbinates normal, hearing normal.  Oropharynx without  erythema, normal tongue, oral mucosa and tonsils. No cervical lymphadenopathy.   No thyromegaly, JVD, or bruits. Carotid pulses 2+ with normal upstroke. Lungs --Clear to auscultation. No wheezing or rales. Heart --Regular rate and rhythm, no murmurs, rubs, gallops, or clicks. Abdomen -- Soft and nontender, no hepatosplenomegaly or masses. Extremities -- Without  edema. Normal looking digits, ROM intact; right great toenail with onychomycosis. Neuro -- CN III-XII intact, muscle strength 5/5; no focal deficits. Review of Data:  Labs:  Hospital Outpatient Visit on 04/02/2021   Component Date Value Ref Range Status    WBC 04/02/2021 7.5  4.6 - 13.2 K/uL Final    RBC 04/02/2021 4.84  4.70 - 5.50 M/uL Final    HGB 04/02/2021 15.0  13.0 - 16.0 g/dL Final    HCT 04/02/2021 44.2  36.0 - 48.0 % Final    MCV 04/02/2021 91.3  74.0 - 97.0 FL Final    MCH 04/02/2021 31.0  24.0 - 34.0 PG Final    MCHC 04/02/2021 33.9  31.0 - 37.0 g/dL Final    RDW 04/02/2021 13.6  11.6 - 14.5 % Final    PLATELET 44/09/9107 768  135 - 420 K/uL Final    MPV 04/02/2021 10.0  9.2 - 11.8 FL Final    NEUTROPHILS 04/02/2021 77* 40 - 73 % Final    LYMPHOCYTES 04/02/2021 14* 21 - 52 % Final    MONOCYTES 04/02/2021 8  3 - 10 % Final    EOSINOPHILS 04/02/2021 1  0 - 5 % Final    BASOPHILS 04/02/2021 0  0 - 2 % Final    ABS. NEUTROPHILS 04/02/2021 5.7  1.8 - 8.0 K/UL Final    ABS. LYMPHOCYTES 04/02/2021 1.0  0.9 - 3.6 K/UL Final    ABS. MONOCYTES 04/02/2021 0.6  0.05 - 1.2 K/UL Final    ABS. EOSINOPHILS 04/02/2021 0.1  0.0 - 0.4 K/UL Final    ABS.  BASOPHILS 04/02/2021 0.0  0.0 - 0.1 K/UL Final    DF 04/02/2021 AUTOMATED    Final    Hemoglobin A1c 04/02/2021 5.4  4.2 - 5.6 % Final    Est. average glucose 04/02/2021 108  mg/dL Final    LIPID PROFILE 04/02/2021        Final    Cholesterol, total 04/02/2021 150  <200 MG/DL Final    Triglyceride 04/02/2021 67  <150 MG/DL Final    HDL Cholesterol 04/02/2021 43  40 - 60 MG/DL Final    LDL, calculated 04/02/2021 93.6  0 - 100 MG/DL Final    VLDL, calculated 04/02/2021 13.4  MG/DL Final    CHOL/HDL Ratio 04/02/2021 3.5  0 - 5.0   Final    Magnesium 04/02/2021 2.0  1.6 - 2.6 mg/dL Final    Sodium 04/02/2021 145  136 - 145 mmol/L Final    Potassium 04/02/2021 4.5  3.5 - 5.5 mmol/L Final    Chloride 04/02/2021 112* 100 - 111 mmol/L Final    CO2 04/02/2021 26  21 - 32 mmol/L Final    Anion gap 04/02/2021 7  3.0 - 18 mmol/L Final    Glucose 04/02/2021 93  74 - 99 mg/dL Final    BUN 04/02/2021 8  7.0 - 18 MG/DL Final    Creatinine 04/02/2021 0.82  0.6 - 1.3 MG/DL Final    BUN/Creatinine ratio 04/02/2021 10* 12 - 20   Final    GFR est AA 04/02/2021 >60  >60 ml/min/1.73m2 Final    GFR est non-AA 04/02/2021 >60  >60 ml/min/1.73m2 Final    Calcium 04/02/2021 9.1  8.5 - 10.1 MG/DL Final    Bilirubin, total 04/02/2021 1.8* 0.2 - 1.0 MG/DL Final    ALT (SGPT) 04/02/2021 29  16 - 61 U/L Final    AST (SGOT) 04/02/2021 21  10 - 38 U/L Final    Alk.  phosphatase 04/02/2021 82  45 - 117 U/L Final    Protein, total 04/02/2021 6.7  6.4 - 8.2 g/dL Final    Albumin 04/02/2021 3.8  3.4 - 5.0 g/dL Final    Globulin 04/02/2021 2.9  2.0 - 4.0 g/dL Final    A-G Ratio 04/02/2021 1.3  0.8 - 1.7   Final    Microalbumin,urine random 04/02/2021 4.82* 0 - 3.0 MG/DL Final    Creatinine, urine 04/02/2021 203.00* 30 - 125 mg/dL Final    Microalbumin/Creat ratio (mg/g cre* 04/02/2021 24  0 - 30 mg/g Final    TSH 04/02/2021 1.33  0.36 - 3.74 uIU/mL Final    Vitamin D 25-Hydroxy 04/02/2021 35.0  30 - 100 ng/mL Final    Color 04/02/2021 YELLOW    Final    Appearance 04/02/2021 CLEAR    Final    Specific gravity 04/02/2021 1.022  1.005 - 1.030   Final    pH (UA) 04/02/2021 5.0  5.0 - 8.0   Final    Protein 04/02/2021 Negative  NEG mg/dL Final    Glucose 04/02/2021 Negative  NEG mg/dL Final    Ketone 04/02/2021 Negative  NEG mg/dL Final    Bilirubin 04/02/2021 Negative  NEG   Final    Blood 04/02/2021 TRACE* NEG   Final    Urobilinogen 04/02/2021 0.2  0.2 - 1.0 EU/dL Final  Nitrites 04/02/2021 Negative  NEG   Final    Leukocyte Esterase 04/02/2021 MODERATE* NEG   Final    WBC 04/02/2021 21 to 35  0 - 5 /hpf Final    Bacteria 04/02/2021 1+* NEG /hpf Final    Mucus 04/02/2021 2+* NEG /lpf Final       Health Maintenance:  Screening:    Colorectal: colonoscopy (3/2016) tubular adenomas. Dr. Sergio Mccloud. Due 3/2021. Depression: none   DM (HbA1c/FPG): FPG 93/ HbA1c 5.4 (3/2021)   Hepatitis C: N/A   Falls: none   DEXA: N/A   PSA/SHANTELL: PSA 1.0 (3/2020)   Glaucoma: regular eye exams with Dr. 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 9) E66.3       Plan:  1. Transient speech disturbance. Reports an episode in 12/2020 of difficulty speaking for approximately two minutes. Aware of deficit but could not formulate words. Denies any visual changes or focal deficits associated with episode. Denies any history of palpitations. Denies recurrence. Will proceed with brain MRI and carotid duplex scan given concerning symptoms for possible TIA. Advised patient to begin aspirin 81 mg daily. On statin. Further recommendations after review of imaging. Advise patient that if symptoms recur, he should go directly to the ED. 2. Vertigo. Patient reported worsening tinnintus and vertigo at last visit in 7/2020 suggestive of vestibular neuritis. Treated with meclizine with improvement.  Reports recurrent episode in 3/2021, and reported improvement over several days with meclizine. Given new onset over last 10 months and new TIA symptoms, will obtain contrast when performing brain MRI so as to evaluate IAC and rule out auditory nerve lesion. Will continue to monitor. 3. Hypertension. Remains well controlled without medication. Renal function remains normal with creatinine 0.82/ eGFR >60. Continue to follow. 4. Hyperlipidemia. On moderate intensity dose atorvastatin 20 mg daily with LDL 93 and HDL 43, indicative of reasonable control. However, goal if evidence of PAD found woould be LDL <70. Will consider increasing dose of atorvastatin to 40 mg daily after reviewing imaging studies for possible TIA. Emphasized importance of continued lifestyle modifications, including diet and exercise. 5. Abnormal fasting glucose. Fasting glucose and HbA1c mildly elevated in past. However, decreased seven pounds and has been maintaining weight over last year. Both remain normalized. Discussed importance of continued lifestyle modifications, including diet, exercise, and weight loss. Will continue to follow. 6. Allergic rhinitis. Patient reporting increased post-nasal drainage. Advised to begin Zyrtec and Flonase. Follow. 7. UTI. Patient reported urinary frequency, and urinalysis consistent with infection on pre-visit labs. Treatment empirically with Bactrim x 7 days. Urine culture ordered, but not performed. Patient reports resolution of symptoms today. 8. Diverticulosis. Extensive left sided disease on colonoscopy. Had not had episode of diverticulitis in over 5 years, and symptoms in 3/2019 not likely related to diverticulitis given lack of improvement with antibiotics. Continue high fiber diet and follow. 9. Onychomycosis, right great toe. Will treat with efinaconazole topical solution for 48 weeks. Advised to call if no improvement. 10. Overweight. Emphasized importance of continued lifestyle modifications, including diet and exercise. Will continue to follow. 11. Health maintenance. Already received influenza vaccine. Also received Tdap. Shingrix vaccine too expensive so holding off for now. Completed PfizerCOVID 19 vaccine series. Will give Pneumovax 23 today as due for repeat. Other immunizations up to date. Colonoscopy due 3/2021. Will place referral for Dr. Tabitha Brooks. Prostate cancer screening up to date. Patient wishing to continue following PSA. Vitamin D level remains in normal range. Continue maintenance dose supplement. Continue regular eye exams with Dr. Amy Tom. Medicare wellness visit up to date. Total time: 45 minutes spent with the patient on counseling, answering questions and/or coordination of care. Complex medical review performed, including review of medical history, lab results, and testing. Complicated management plan formulated. Patient understands recommendations and agrees with plan. Follow-up in 12 months.

## 2021-05-04 ENCOUNTER — HOSPITAL ENCOUNTER (OUTPATIENT)
Age: 81
Discharge: HOME | End: 2021-05-04
Attending: INTERNAL MEDICINE
Payer: MEDICARE

## 2021-05-04 ENCOUNTER — HOSPITAL ENCOUNTER (OUTPATIENT)
Dept: VASCULAR SURGERY | Age: 81
Discharge: HOME | End: 2021-05-04
Attending: INTERNAL MEDICINE
Payer: MEDICARE

## 2021-05-04 LAB — CREAT UR-MCNC: 0.8 MG/DL (ref 0.6–1.3)

## 2021-05-04 PROCEDURE — 74011636320 HC RX REV CODE- 636/320: Performed by: INTERNAL MEDICINE

## 2021-05-04 PROCEDURE — A9575 INJ GADOTERATE MEGLUMI 0.1ML: HCPCS | Performed by: INTERNAL MEDICINE

## 2021-05-04 PROCEDURE — 70553 MRI BRAIN STEM W/O & W/DYE: CPT

## 2021-05-04 PROCEDURE — 93880 EXTRACRANIAL BILAT STUDY: CPT

## 2021-05-04 PROCEDURE — 82565 ASSAY OF CREATININE: CPT

## 2021-05-04 RX ADMIN — GADOTERATE MEGLUMINE 16 ML: 376.9 INJECTION INTRAVENOUS at 11:13

## 2021-05-05 ENCOUNTER — TELEPHONE (OUTPATIENT)
Dept: INTERNAL MEDICINE CLINIC | Age: 81
End: 2021-05-05

## 2021-05-05 DIAGNOSIS — R47.9 TRANSIENT SPEECH DISTURBANCE: Primary | ICD-10-CM

## 2021-05-05 LAB
LEFT BULB EDV: 17.6 CM/S
LEFT BULB PSV: 68.1 CM/S
LEFT CCA DIST DIAS: 21.5 CM/S
LEFT CCA DIST SYS: 77.1 CM/S
LEFT CCA MID DIAS: 23.68 CM/S
LEFT CCA MID SYS: 83.22 CM/S
LEFT CCA PROX DIAS: 25.4 CM/S
LEFT CCA PROX SYS: 91.4 CM/S
LEFT ECA DIAS: 13.7 CM/S
LEFT ECA SYS: 66.8 CM/S
LEFT ICA DIST DIAS: 25.3 CM/S
LEFT ICA DIST SYS: 61.3 CM/S
LEFT ICA MID DIAS: 26.7 CM/S
LEFT ICA MID SYS: 62.9 CM/S
LEFT ICA PROX DIAS: 31.8 CM/S
LEFT ICA PROX SYS: 78.4 CM/S
LEFT ICA/CCA SYS: 1.02
LEFT SUBCLAVIAN DIAS: 0 CM/S
LEFT SUBCLAVIAN SYS: 88.9 CM/S
LEFT VERTEBRAL DIAS: 11.1 CM/S
LEFT VERTEBRAL SYS: 28.9 CM/S
RIGHT BULB EDV: 15 CM/S
RIGHT BULB PSV: 57.7 CM/S
RIGHT CCA DIST DIAS: 21.5 CM/S
RIGHT CCA DIST SYS: 75.8 CM/S
RIGHT CCA MID DIAS: 21.48 CM/S
RIGHT CCA MID SYS: 82.31 CM/S
RIGHT CCA PROX DIAS: 17.6 CM/S
RIGHT CCA PROX SYS: 77.1 CM/S
RIGHT ECA DIAS: 8.5 CM/S
RIGHT ECA SYS: 61.6 CM/S
RIGHT ICA DIST DIAS: 35.7 CM/S
RIGHT ICA DIST SYS: 83.6 CM/S
RIGHT ICA MID DIAS: 29.3 CM/S
RIGHT ICA MID SYS: 83.6 CM/S
RIGHT ICA PROX DIAS: 22.8 CM/S
RIGHT ICA PROX SYS: 64.2 CM/S
RIGHT ICA/CCA SYS: 1.1
RIGHT SUBCLAVIAN DIAS: 0 CM/S
RIGHT SUBCLAVIAN SYS: 73 CM/S
RIGHT VERTEBRAL DIAS: 11.29 CM/S
RIGHT VERTEBRAL SYS: 33.8 CM/S

## 2021-05-05 NOTE — TELEPHONE ENCOUNTER
MRI Results (most recent):  Results from Orders Only encounter on 04/13/21   MRI BRAIN W WO CONT    Narrative EXAM: MRI BRAIN W WO CONT    CLINICAL INDICATION/HISTORY: Speech difficulty expressive aphasia with vertigo    TECHNIQUE: Multisequence multiplanar MR imaging acquired through the brain. Contrast used: 20 cc Gadavist    COMPARISON: None    FINDINGS:    Parenchyma: There is no evidence of an acute infarction the hemispheres  symmetric normal    Deep white matter demonstrates some prominent perivascular spaces in the insula  basal ganglia and into the frontal regions all normal variant    There is a very minimal amount of gliotic change the most conspicuous lesion  seen image 20 series 4 in the frontal region left side these are minimal changes  nonspecific. Postcontrast images were obtained demonstrating no evidence of meningeal  enhancement parenchyma and involves any acute abnormal pathologic enhancement    The venous anatomy is well displayed on this exam appears entirely patent No  acute infarction. No acute hemorrhage. No mass lesion. No pathologic  enhancement. CSF spaces: Ventricles and cisterns remain midline in position    IAC regions: This portion of the exam includes the routine as well as  specialized thin section coronal and axial images pre and postcontrast    Internal auditory canal contents of the 7th and 8th nerves are normal root entry  zone cisternal spaces are normal    Cochlea semicircular canals are symmetric and normal. No abnormal enhancement is  identified in the middle ear structures. The mastoids are free of any retained  fluid. Parasellar region: Unremarkable    Vasculature: Appropriate flow voids within the major skull base vasculature.     Cervicomedullary junction: Patent    Orbits: Bilateral cataract surgeries    Paranasal sinuses: Clear           Impression There is no evidence of an acute infarction     There is also very minimal gliotic changes identified nonspecific presumably on  the basis of some small vessel disease although very mild for patient's age    Specialized views of the IACs demonstrate no evidence of tumor obvious  inflammation. Carotid duplex scan (5/5/2021)   mild (< 50%) stenosis of the bilateral internal carotid arteries. Called and discussed MRI and carotid duplex results with patient. Advised that if episode recurs, would recommend ED evaluation. On statin and aspirin. Patient concerned that having increased bleeding since taking aspirin. Advised that may decrease dose to 2 days per week.

## 2021-09-01 ENCOUNTER — CLINICAL SUPPORT (OUTPATIENT)
Dept: SURGERY | Age: 81
End: 2021-09-01

## 2021-09-01 VITALS
HEIGHT: 65 IN | TEMPERATURE: 97.3 F | WEIGHT: 163 LBS | HEART RATE: 60 BPM | OXYGEN SATURATION: 98 % | RESPIRATION RATE: 17 BRPM | BODY MASS INDEX: 27.16 KG/M2

## 2021-09-01 DIAGNOSIS — Z01.818 PRE-OP TESTING: Primary | ICD-10-CM

## 2021-09-01 DIAGNOSIS — Z86.010 HISTORY OF COLONIC POLYPS: ICD-10-CM

## 2021-09-01 DIAGNOSIS — Z12.11 COLON CANCER SCREENING: ICD-10-CM

## 2021-09-01 NOTE — PROGRESS NOTES
Review of Systems   Constitutional: Negative. HENT: Negative. Eyes: Negative. Respiratory: Negative. Cardiovascular: Positive for palpitations. Negative for chest pain, orthopnea, claudication, leg swelling and PND. Gastrointestinal: Negative. Genitourinary: Negative. Musculoskeletal: Positive for joint pain. Negative for back pain, falls, myalgias and neck pain. Knee pain   Skin: Negative. Neurological: Negative. Endo/Heme/Allergies: Negative for environmental allergies and polydipsia. Bruises/bleeds easily. Colon Screen    Patient: Tammie Avila MRN: [id number]  SSN: [id number]    YOB: 1940  Age: 80 y.o. Sex: male        Subjective:   Tammie Avila was referred by his PCP, Andrez Crane MD.  Patient referred for colonoscopy for   Personal history of colon polyps (screening only). Patient denies rectal pain or bleeding. Abdominal surgeries as described below, specifically none. Family history as described below, specifically none. Last colonoscopy was 5 years ago with Dr. Dahlia Gerardo. Patient was recalled for 5 years.     No Known Allergies    Past Medical History:   Diagnosis Date    Abnormal glucose     Calculus of kidney     Colon polyps     Diverticulitis 02/2019    Diverticulosis of colon 03/2016    Colonoscopy (3/2016) advanced left sided diverticulosis    Essential hypertension 3/25/2017    Hyperlipidemia     Vertigo 07/2020     Past Surgical History:   Procedure Laterality Date    ENDOSCOPY, COLON, DIAGNOSTIC      Jan 2011, Dr. Joan Sherman, colon polyps, was going yearly    HX BLEPHAROPLASTY      HX CATARACT REMOVAL  4/2012    both eyes    HX OTHER SURGICAL      [place]    [place] COLONOSCOPY W/BIOPSY SINGLE/MULTIPLE  3-4-16    Dr. Dahlia Gerardo      Family History   Problem Relation Age of Onset    Arthritis-osteo Mother     Elevated Lipids Mother     Heart Disease Father      Social History     Tobacco Use    Smoking status: Former Smoker Quit date: 1988     Years since quittin.7    Smokeless tobacco: Never Used   Substance Use Topics    Alcohol use: Yes     Alcohol/week: 1.0 standard drinks     Types: 1 Glasses of wine per week     Comment: glass of wine/week      Prior to Admission medications    Medication Sig Start Date End Date Taking? Authorizing Provider   glucosamine/chondr bingham A sod (OSTEO BI-FLEX PO) Take  by mouth. Yes Provider, Historical   triprolidine/pseudoephedrine (ANTIHISTAMINE PO) Take  by mouth. Yes Provider, Historical   psyllium (METAMUCIL) powd Take  by mouth. Yes Provider, Historical   atorvastatin (LIPITOR) 20 mg tablet TAKE 1 TABLET DAILY 20  Yes Curtis Ojeda MD   cholecalciferol, vitamin D3, (VITAMIN D3) 2,000 unit tab Take 2,000 Units by mouth daily. Yes Provider, Historical   efinaconazole (JUBLIA) angeline topical solution Apply one drop to the surface and one drop at the end of affected toenails once daily. Spread with applicator brush. Treat for 48 weeks. Patient not taking: Reported on 2021   Curtis Ojeda MD          Risks colonoscopy described- colon injury, missed lesion, anesthesia problems, bleeding       Shell Cooley, LPN  [id number]  11:20 AM

## 2021-11-26 RX ORDER — ATORVASTATIN CALCIUM 20 MG/1
TABLET, FILM COATED ORAL
Qty: 90 TABLET | Refills: 3 | Status: SHIPPED | OUTPATIENT
Start: 2021-11-26

## 2021-11-29 NOTE — PERIOP NOTES
PRE-SURGICAL INSTRUCTIONS        Patient's Name:  May Cisse VYAPUYOUNG Date:  11/29/2021            Covid Testing Date and Time:    Surgery Date:  12/15/2021                1. Do NOT eat or drink anything, including candy, gum, or ice chips after midnight on 12/15, unless you have specific instructions from your surgeon or anesthesia provider to do so.  2. You may brush your teeth before coming to the hospital.  3. No smoking 24 hours prior to the day of surgery. 4. No alcohol 24 hours prior to the day of surgery. 5. No recreational drugs for one week prior to the day of surgery. 6. Leave all valuables, including money/purse, at home. 7. Remove all jewelry, nail polish, acrylic nails, and makeup (including mascara); no lotions powders, deodorant, or perfume/cologne/after shave on the skin. 8. Follow instruction for Hibiclens washes and CHG wipes from surgeon's office. 9. Glasses/contact lenses and dentures may be worn to the hospital.  They will be removed prior to surgery. 10. Call your doctor if symptoms of a cold or illness develop within 24-48 hours prior to your surgery. 11.  If you are having an outpatient procedure, please make arrangements for a responsible ADULT TO [address] [place] [address] and stay with you for 24 hours after your surgery. 12. ONE VISITOR in the hospital at this time for outpatient procedures. Exceptions may be made for surgical admissions, per nursing unit guidelines      Special Instructions:      Bring list of CURRENT medications. Bring any pertinent legal medical records. Take these medications the morning of surgery with a sip of water:  Per office    Complete bowel prep per MD instructions. On the day of surgery, come in the main entrance of [facility]. Let the  at the desk know you are there for surgery. A staff member will come escort you to the surgical area on the second floor.     If you have any questions or concerns, please do not hesitate to call:     (Prior to the day of surgery) [facility] department:  [phone]   (Day of surgery) Pre-Op department:  [phone]    These surgical instructions were reviewed with the aptient during the [facility] phone call.

## 2021-12-14 ENCOUNTER — ANESTHESIA EVENT (OUTPATIENT)
Dept: ENDOSCOPY | Age: 81
End: 2021-12-14
Payer: MEDICARE

## 2021-12-15 ENCOUNTER — HOSPITAL ENCOUNTER (OUTPATIENT)
Age: 81
Setting detail: OUTPATIENT SURGERY
Discharge: HOME | End: 2021-12-15
Attending: COLON & RECTAL SURGERY | Admitting: COLON & RECTAL SURGERY
Payer: MEDICARE

## 2021-12-15 ENCOUNTER — ANESTHESIA (OUTPATIENT)
Dept: ENDOSCOPY | Age: 81
End: 2021-12-15
Payer: MEDICARE

## 2021-12-15 VITALS
OXYGEN SATURATION: 97 % | HEART RATE: 57 BPM | HEIGHT: 66 IN | BODY MASS INDEX: 26.36 KG/M2 | RESPIRATION RATE: 20 BRPM | TEMPERATURE: 97 F | SYSTOLIC BLOOD PRESSURE: 134 MMHG | WEIGHT: 164 LBS | DIASTOLIC BLOOD PRESSURE: 81 MMHG

## 2021-12-15 PROCEDURE — 77030008565 HC TBNG SUC IRR ERBE -B: Performed by: COLON & RECTAL SURGERY

## 2021-12-15 PROCEDURE — 74011250636 HC RX REV CODE- 250/636: Performed by: NURSE ANESTHETIST, CERTIFIED REGISTERED

## 2021-12-15 PROCEDURE — 00811 ANES LWR INTST NDSC NOS: CPT | Performed by: NURSE ANESTHETIST, CERTIFIED REGISTERED

## 2021-12-15 PROCEDURE — 88305 TISSUE EXAM BY PATHOLOGIST: CPT

## 2021-12-15 PROCEDURE — 77030021593 HC FCPS BIOP ENDOSC BSC -A: Performed by: COLON & RECTAL SURGERY

## 2021-12-15 PROCEDURE — 99100 ANES PT EXTEME AGE<1 YR&>70: CPT | Performed by: ANESTHESIOLOGY

## 2021-12-15 PROCEDURE — 74011250637 HC RX REV CODE- 250/637: Performed by: NURSE ANESTHETIST, CERTIFIED REGISTERED

## 2021-12-15 PROCEDURE — 99100 ANES PT EXTEME AGE<1 YR&>70: CPT | Performed by: NURSE ANESTHETIST, CERTIFIED REGISTERED

## 2021-12-15 PROCEDURE — 77030013992 HC SNR POLYP ENDOSC BSC -B: Performed by: COLON & RECTAL SURGERY

## 2021-12-15 PROCEDURE — 74011000250 HC RX REV CODE- 250: Performed by: NURSE ANESTHETIST, CERTIFIED REGISTERED

## 2021-12-15 PROCEDURE — 76060000032 HC ANESTHESIA 0.5 TO 1 HR: Performed by: COLON & RECTAL SURGERY

## 2021-12-15 PROCEDURE — 45380 COLONOSCOPY AND BIOPSY: CPT | Performed by: COLON & RECTAL SURGERY

## 2021-12-15 PROCEDURE — 2709999900 HC NON-CHARGEABLE SUPPLY: Performed by: COLON & RECTAL SURGERY

## 2021-12-15 PROCEDURE — 45385 COLONOSCOPY W/LESION REMOVAL: CPT | Performed by: COLON & RECTAL SURGERY

## 2021-12-15 PROCEDURE — C1729 CATH, DRAINAGE: HCPCS | Performed by: COLON & RECTAL SURGERY

## 2021-12-15 PROCEDURE — 00811 ANES LWR INTST NDSC NOS: CPT | Performed by: ANESTHESIOLOGY

## 2021-12-15 PROCEDURE — 76040000007: Performed by: COLON & RECTAL SURGERY

## 2021-12-15 RX ORDER — LIDOCAINE HYDROCHLORIDE 10 MG/ML
0.1 INJECTION, SOLUTION EPIDURAL; INFILTRATION; INTRACAUDAL; PERINEURAL AS NEEDED
Status: DISCONTINUED | OUTPATIENT
Start: 2021-12-15 | End: 2021-12-15 | Stop reason: HOSPADM

## 2021-12-15 RX ORDER — FAMOTIDINE 20 MG/1
20 TABLET, FILM COATED ORAL ONCE
Status: COMPLETED | OUTPATIENT
Start: 2021-12-15 | End: 2021-12-15

## 2021-12-15 RX ORDER — SODIUM CHLORIDE 0.9 % (FLUSH) 0.9 %
5-40 SYRINGE (ML) INJECTION AS NEEDED
Status: CANCELLED | OUTPATIENT
Start: 2021-12-15

## 2021-12-15 RX ORDER — SODIUM CHLORIDE, SODIUM LACTATE, POTASSIUM CHLORIDE, CALCIUM CHLORIDE 600; 310; 30; 20 MG/100ML; MG/100ML; MG/100ML; MG/100ML
75 INJECTION, SOLUTION INTRAVENOUS CONTINUOUS
Status: DISCONTINUED | OUTPATIENT
Start: 2021-12-15 | End: 2021-12-15 | Stop reason: HOSPADM

## 2021-12-15 RX ORDER — SODIUM CHLORIDE 0.9 % (FLUSH) 0.9 %
5-40 SYRINGE (ML) INJECTION EVERY 8 HOURS
Status: CANCELLED | OUTPATIENT
Start: 2021-12-15

## 2021-12-15 RX ORDER — SODIUM CHLORIDE, SODIUM LACTATE, POTASSIUM CHLORIDE, CALCIUM CHLORIDE 600; 310; 30; 20 MG/100ML; MG/100ML; MG/100ML; MG/100ML
50 INJECTION, SOLUTION INTRAVENOUS CONTINUOUS
Status: CANCELLED | OUTPATIENT
Start: 2021-12-15

## 2021-12-15 RX ORDER — PROPOFOL 10 MG/ML
INJECTION, EMULSION INTRAVENOUS AS NEEDED
Status: DISCONTINUED | OUTPATIENT
Start: 2021-12-15 | End: 2021-12-15 | Stop reason: HOSPADM

## 2021-12-15 RX ORDER — LIDOCAINE HYDROCHLORIDE 20 MG/ML
INJECTION, SOLUTION EPIDURAL; INFILTRATION; INTRACAUDAL; PERINEURAL AS NEEDED
Status: DISCONTINUED | OUTPATIENT
Start: 2021-12-15 | End: 2021-12-15 | Stop reason: HOSPADM

## 2021-12-15 RX ADMIN — PROPOFOL 40 MG: 10 INJECTION, EMULSION INTRAVENOUS at 11:03

## 2021-12-15 RX ADMIN — PROPOFOL 30 MG: 10 INJECTION, EMULSION INTRAVENOUS at 11:29

## 2021-12-15 RX ADMIN — PROPOFOL 50 MG: 10 INJECTION, EMULSION INTRAVENOUS at 11:15

## 2021-12-15 RX ADMIN — PROPOFOL 40 MG: 10 INJECTION, EMULSION INTRAVENOUS at 11:00

## 2021-12-15 RX ADMIN — SODIUM CHLORIDE, SODIUM LACTATE, POTASSIUM CHLORIDE, AND CALCIUM CHLORIDE: 600; 310; 30; 20 INJECTION, SOLUTION INTRAVENOUS at 10:45

## 2021-12-15 RX ADMIN — PROPOFOL 50 MG: 10 INJECTION, EMULSION INTRAVENOUS at 11:22

## 2021-12-15 RX ADMIN — PROPOFOL 80 MG: 10 INJECTION, EMULSION INTRAVENOUS at 10:58

## 2021-12-15 RX ADMIN — FAMOTIDINE 20 MG: 20 TABLET ORAL at 10:32

## 2021-12-15 RX ADMIN — PROPOFOL 40 MG: 10 INJECTION, EMULSION INTRAVENOUS at 11:06

## 2021-12-15 RX ADMIN — PROPOFOL 50 MG: 10 INJECTION, EMULSION INTRAVENOUS at 11:09

## 2021-12-15 RX ADMIN — LIDOCAINE HYDROCHLORIDE 100 MG: 20 INJECTION, SOLUTION EPIDURAL; INFILTRATION; INTRACAUDAL; PERINEURAL at 11:06

## 2021-12-15 NOTE — ANESTHESIA POSTPROCEDURE EVALUATION
Procedure(s):  COLONOSCOPY with Polypectomies. MAC    Anesthesia Post Evaluation      Multimodal analgesia: multimodal analgesia used between 6 hours prior to anesthesia start to PACU discharge  Patient location during evaluation: bedside  Patient participation: complete - patient participated  Level of consciousness: awake  Pain score: 0  Pain management: adequate  Airway patency: patent  Anesthetic complications: no  Cardiovascular status: stable  Respiratory status: acceptable  Hydration status: acceptable  Post anesthesia nausea and vomiting:  controlled  Final Post Anesthesia Temperature Assessment:  Normothermia (36.0-37.5 degrees C)      INITIAL Post-op Vital signs:   Vitals Value Taken Time   /70 12/15/21 1200   Temp 35.9 °C (96.7 °F) 12/15/21 1200   Pulse 54 12/15/21 1207   Resp 9 12/15/21 1207   SpO2 100 % 12/15/21 1207   Vitals shown include unvalidated device data.

## 2021-12-15 NOTE — ANESTHESIA PREPROCEDURE EVALUATION
Relevant Problems   CARDIOVASCULAR   (+) Essential hypertension       Anesthetic History   No history of anesthetic complications            Review of Systems / Medical History  Patient summary reviewed and pertinent labs reviewed    Pulmonary  Within defined limits                 Neuro/Psych         TIA    Comments: VERTIGO Cardiovascular    Hypertension: well controlled          Hyperlipidemia    Exercise tolerance: >4 METS  Comments: NEG NST   GI/Hepatic/Renal  Within defined limits              Endo/Other  Within defined limits           Other Findings              Physical Exam    Airway  Mallampati: II  TM Distance: > 6 cm  Neck ROM: normal range of motion   Mouth opening: Normal     Cardiovascular  Regular rate and rhythm,  S1 and S2 normal,  no murmur, click, rub, or gallop             Dental  No notable dental hx       Pulmonary  Breath sounds clear to auscultation               Abdominal  GI exam deferred       Other Findings            Anesthetic Plan    ASA: 2  Anesthesia type: MAC          Induction: Intravenous  Anesthetic plan and risks discussed with: Patient

## 2021-12-15 NOTE — H&P
HPI: Poonam Goins is a 80 y.o. male presenting with chief complain of history of polyps    Past Medical History:   Diagnosis Date    Abnormal glucose     Calculus of kidney     Colon polyps     Diverticulitis 2019    Diverticulosis of colon 2016    Colonoscopy (3/2016) advanced left sided diverticulosis    Essential hypertension 3/25/2017    pt denies    Hyperlipidemia     Vertigo 2020       Past Surgical History:   Procedure Laterality Date    ENDOSCOPY, COLON, DIAGNOSTIC      2011, Dr. Cecilio Suarez, colon polyps, was going yearly    HX BLEPHAROPLASTY      HX CATARACT REMOVAL  2012    both eyes    HX OTHER SURGICAL      cn    [place] COLONOSCOPY W/BIOPSY SINGLE/MULTIPLE  3-4-16    Dr. Nick Chen       Family History   Problem Relation Age of Onset    OSTEOARTHRITIS Mother     Elevated Lipids Mother     Heart Disease Father        Social History     Socioeconomic History    Marital status:    Tobacco Use    Smoking status: Former Smoker     Quit date: 1988     Years since quittin.0    Smokeless tobacco: Never Used   Substance and Sexual Activity    Alcohol use: Yes     Alcohol/week: 1.0 standard drink     Types: 1 Glasses of wine per week     Comment: glass of wine/week    Drug use: No    Sexual activity: Yes     Partners: Female       Review of Systems - neg    Outpatient Medications Marked as Taking for the 12/15/21 encounter [facility] Encounter)   Medication Sig Dispense Refill    OTHER Neuriva daily for memory      atorvastatin (LIPITOR) 20 mg tablet TAKE 1 TABLET DAILY 90 Tablet 3    glucosamine/chondr bingham A sod (OSTEO BI-FLEX PO) Take  by mouth daily.  triprolidine/pseudoephedrine (ANTIHISTAMINE PO) Take  by mouth daily.  psyllium (METAMUCIL) powd Take  by mouth. 3 capsules      cholecalciferol, vitamin D3, (VITAMIN D3) 2,000 unit tab Take 2,000 Units by mouth daily.          No Known Allergies    Vitals:    21 1502 12/15/21 0909   BP:  (!) 154/78 Pulse:  60   Resp:  18   Temp:  97.7 °F (36.5 °C)   SpO2:  100%   Weight: 73.9 kg (163 lb) 74.4 kg (164 lb)   Height:  5' 6\" (1.676 m)       Physical Exam  Constitutional:       Appearance: He is well-developed. HENT:      Head: Normocephalic and atraumatic. Eyes:      Conjunctiva/sclera: Conjunctivae normal.   Abdominal:      General: There is no distension. Palpations: Abdomen is soft. There is no mass. Tenderness: There is no abdominal tenderness. Musculoskeletal:         General: Normal range of motion. Lymphadenopathy:      Cervical: No cervical adenopathy. Skin:     General: Skin is warm and dry. Neurological:      Sensory: No sensory deficit. Psychiatric:         Speech: Speech normal.         Assessment / Plan    colonoscopy    The diagnoses and plan were discussed with the patient. All questions answered. Plan of care agreed to by all concerned.

## 2021-12-15 NOTE — DISCHARGE INSTRUCTIONS
Colonoscopy: What to Expect at [address] [place] [address]  After you have a colonoscopy, you will stay at the clinic for 1 to 2 hours until the medicines wear off. Then you can go home. But you will need to arrange for a ride. Your doctor will tell you when you can eat and do your other usual activities. Your doctor will talk to you about when you will need your next colonoscopy. Your doctor can help you decide how often you need to be checked. This will depend on the results of your test and your risk for colorectal cancer. After the test, you may be bloated or have gas pains. You may need to pass gas. If a biopsy was done or a polyp was removed, you may have streaks of blood in your stool (feces) for a few days. This care sheet gives you a general idea about how long it will take for you to recover. But each person recovers at a different pace. Follow the steps below to get better as quickly as possible. How can you care for yourself at home? Activity  · Rest when you feel tired. · You can do your normal activities when it feels okay to do so. Diet  · Follow your doctor's directions for eating. · Unless your doctor has told you not to, drink plenty of fluids. This helps to replace the fluids that were lost during the colon prep. · Do not drink alcohol. Medicines  · If polyps were removed or a biopsy was done during the test, your doctor may tell you not to take aspirin or other anti-inflammatory medicines for a few days. These include ibuprofen (Advil, Motrin) and naproxen (Aleve). Other instructions  · For your safety, do not drive or operate machinery until the medicine wears off and you can think clearly. Your doctor may tell you not to drive or operate machinery until the day after your test.  · Do not sign legal documents or make major decisions until the medicine wears off and you can think clearly. The anesthesia can make it hard for you to fully understand what you are agreeing to.   Follow-up care is a key part of your treatment and safety. Be sure to make and go to all appointments, and call your doctor if you are having problems. It's also a good idea to know your test results and keep a list of the medicines you take. When should you call for help? Call 911 anytime you think you may need emergency care. For example, call if:  · You passed out (lost consciousness). · You pass maroon or bloody stools. · You have severe belly pain. Call your doctor now or seek immediate medical care if:  · Your stools are black and tarlike. · Your stools have streaks of blood, but you did not have a biopsy or any polyps removed. · You have belly pain, or your belly is swollen and firm. · You vomit. · You have a fever. · You are very dizzy. Watch closely for changes in your health, and be sure to contact your doctor if you have any problems. Where can you learn more? Go to MIGSIF.be  Enter E264 in the search box to learn more about \"Colonoscopy: What to Expect at Home. \"   © 6545-6216 Healthwise, Incorporated. Care instructions adapted under license by [facility] (which disclaims liability or warranty for this information). This care instruction is for use with your licensed healthcare professional. If you have questions about a medical condition or this instruction, always ask your healthcare professional. [place] [address] any warranty or liability for your use of this information. Content Version: 48.3.046837; Current as of: November 14, 2014      DISCHARGE SUMMARY from Nurse     POST-PROCEDURE INSTRUCTIONS:    Call your Physician if you:  ? Observe any excess bleeding. ? Develop a temperature over 100.5o F.  ? Experience abdominal, shoulder or chest pain. ? Notice any signs of decreased circulation or nerve impairment to an extremity such as a change in color, persistent numbness, tingling, coldness or increase in pain. ?  Vomit blood or you have nausea and vomiting lasting longer than 4 hours. ? Are unable to take medications. ? Are unable to urinate within 8 hours after discharge following general anesthesia or intravenous sedation. For the next 24 hours after receiving general anesthesia or intravenous sedation, or while taking prescription Narcotics, limit your activities:  ? Do NOT drive a motor vehicle, operate hazard machinery or power tools, or perform tasks that require coordination. The medication you received during your procedure may have some effect on your mental awareness. ? Do NOT make important personal or business decisions. The medication you received during your procedure may have some effect on your mental awareness. ? Do NOT drink alcoholic beverages. These drinks do not mix well with the medications that have been given to you. ? Upon discharge from the hospital, you must be accompanied by a responsible adult. ? Resume your diet as directed by your physician. ? Resume medications as your physician has prescribed. ? Please give a list of your current medications to your Primary Care Provider. ? Please update this list whenever your medications are discontinued, doses are changed, or new medications (including over-the-counter products) are added. ? Please carry medication information at all times in case of emergency situations. These are general instructions for a healthy lifestyle:    No smoking/ No tobacco products/ Avoid exposure to second hand smoke.  Surgeon General's Warning:  Quitting smoking now greatly reduces serious risk to your health. Obesity, smoking, and a sedentary lifestyle greatly increase your risk for illness.    A healthy diet, regular physical exercise & weight monitoring are important for maintaining a healthy lifestyle   You may be retaining fluid if you have a history of heart failure or if you experience any of the following symptoms:  Weight gain of 3 pounds or more overnight or 5 pounds in a week, increased swelling in our hands or feet or shortness of breath while lying flat in bed. Please call your doctor as soon as you notice any of these symptoms; do not wait until your next office visit. Colorectal Screening   Colorectal cancer almost always develops from precancerous polyps (abnormal growths) in the colon or rectum. Screening tests can find precancerous polyps, so that they can be removed before they turn into cancer. Screening tests can also find colorectal cancer early, when treatment works best.  [facility] Speak with your physician about when you should begin screening and how often you should be tested. Additional Information    Educational references and/or instructions provided during this visit included:    See attached. APPOINTMENTS:    Per MD Instruction. Discharge information has been reviewed with the patient. The patient verbalized understanding. Patient Education        Colon Polyps: Care Instructions  Your Care Instructions     Colon polyps are growths in the colon or the rectum. The cause of most colon polyps is not known, and most people who get them do not have any problems. But a certain kind can turn into cancer. For this reason, regular testing for colon polyps is important for people as they get older. It is also important for anyone who has an increased risk for colon cancer. Polyps are usually found through routine colon cancer screening tests. Although most colon polyps are not cancerous, they are usually removed and then tested for cancer. Screening for colon cancer saves lives because the cancer can usually be cured if it is caught early. If you have a polyp that is the type that can turn into cancer, you may need more tests to examine your entire colon. The doctor will remove any other polyps that he or she finds, and you will be tested more often. Follow-up care is a key part of your treatment and safety.  Be sure to make and go to all appointments, and call your doctor if you are having problems. It's also a good idea to know your test results and keep a list of the medicines you take. How can you care for yourself at home? Regular exams to look for colon polyps are the best way to prevent polyps from turning into colon cancer. These can include stool tests, sigmoidoscopy, colonoscopy, and CT colonography. Talk with your doctor about a testing schedule that is right for you. To prevent polyps  There is no home treatment that can prevent colon polyps. But these steps may help lower your risk for cancer. · Stay active. Being active can help you get to and stay at a healthy weight. Try to exercise on most days of the week. Walking is a good choice. · Eat well. Choose a variety of vegetables, fruits, legumes (such as peas and beans), fish, poultry, and whole grains. · Do not smoke. If you need help quitting, talk to your doctor about stop-smoking programs and medicines. These can increase your chances of quitting for good. · If you drink alcohol, limit how much you drink. Limit alcohol to 2 drinks a day for men and 1 drink a day for women. When should you call for help? Call your doctor now or seek immediate medical care if:    · You have severe belly pain.     · Your stools are maroon or very bloody. Watch closely for changes in your health, and be sure to contact your doctor if:    · You have a fever.     · You have nausea or vomiting.     · You have a change in bowel habits (new constipation or diarrhea).     · Your symptoms get worse or are not improving as expected. Where can you learn more? Go to http://www.lockett.com/  Enter C571 in the search box to learn more about \"Colon Polyps: Care Instructions. \"  Current as of: December 17, 2020               Content Version: 13.0  © 8505-5436 Healthwise, Incorporated.    Care instructions adapted under license by [employer] (which disclaims liability or warranty for this information). If you have questions about a medical condition or this instruction, always ask your healthcare professional. [place] [address] any warranty or liability for your use of this information.

## 2021-12-15 NOTE — OP NOTES
[facility]  [address], [address] [facility],[address]   [place], [address]  [phone]                    Colonoscopy Procedure Note      Xiao Renteria  1940  [id number]                Date of Procedure: 12/15/2021    Preoperative diagnosis: Colon cancer Screening:  Z12.11    Postoperative diagnosis: Transverse Colon Polyp x 1, Ascending Colon Polyps x 2, Ileocecal Valve Bx's x 2, Sigmoid Polyps x 2    :  Cely Encinas MD    Assistant(s): End[facility] Techn[facility]  Endoscopy RN-1: Vani Rollins RN    Sedation: MAC    Complications: None    Implants: None    Procedure Details:  Prior to the procedure, a history and physical were performed. The patients medications, allergies and sensitivities were reviewed and all questions were answered. After informed consent was obtained for the procedure, with all risks and benefits of procedure explained. The patient was taken to the endoscopy suite and placed in the left lateral decubitus position. Patient identification and proposed procedure were verified prior to the procedure by the nurse and I. After sequential anesthesia administered by anesthesiologist, a digital rectal exam was performed and was normal.  The Olympus video colonoscope was introduced through the anus and advanced to terminal ileum. The quality of preparation was good. The colonoscope was slowly withdrawn and the mucosa examined for any abnormalities. Cecal withdrawal time was greater than 6 minutes. The patient tolerated the procedure well. There were no complications.       Findings/Interventions:   Polyps - #1, 5 mm in size, located in the cecum, removed by cold biopsy and sent for pathology, - #2, 5 mm in size, located in the cecum, removed by cold biopsy and sent for pathology, - #3, 7 mm in size, located in the ascending colon, removed by snare cautery and retrieved for pathology, - #4, 5 mm in size, located in the ascending colon, removed by snare cautery and retrieved for pathology, - #5, 5 mm in size, located in the transverse colon, removed by snare cautery and retrieved for pathology, - #6, 7 mm in size, located in the sigmoid, removed by snare cautery and retrieved for pathology, - #7, 5 mm in size, located in the sigmoid, removed by cold biopsy and sent for pathology    EBL: none    Recommendations: -Repeat colonoscopy in 3 years.    NO aspirin for 5 days     Discharge Disposition:  Home  Chris Pretty MD  12/15/2021  11:35 AM

## 2022-01-07 ENCOUNTER — TELEPHONE (OUTPATIENT)
Dept: SURGERY | Age: 82
End: 2022-01-07

## 2022-01-07 NOTE — TELEPHONE ENCOUNTER
----- Message from Diana Beasley MD sent at 12/17/2021 12:46 PM EST -----  Benign polyp(s). Repeat colonoscopy in 3 year(s) as planned. Notified patient of pathology results. Tickler placed in recall for 3 years. Patient understands.

## 2022-03-18 PROBLEM — E66.3 OVERWEIGHT (BMI 25.0-29.9): Status: ACTIVE | Noted: 2021-04-16

## 2022-03-18 PROBLEM — R42 VERTIGO: Status: ACTIVE | Noted: 2021-04-16

## 2022-03-18 PROBLEM — J30.9 ALLERGIC RHINITIS: Status: ACTIVE | Noted: 2021-04-16

## 2022-03-18 PROBLEM — H81.20 VESTIBULAR NEURONITIS: Status: ACTIVE | Noted: 2020-07-12

## 2022-03-19 PROBLEM — Z86.39 HISTORY OF VITAMIN D DEFICIENCY: Status: ACTIVE | Noted: 2020-07-12

## 2022-03-19 PROBLEM — R47.9 TRANSIENT SPEECH DISTURBANCE: Status: ACTIVE | Noted: 2021-04-16

## 2022-03-19 PROBLEM — M25.561 ACUTE PAIN OF RIGHT KNEE: Status: ACTIVE | Noted: 2020-04-07

## 2022-03-20 PROBLEM — I10 ESSENTIAL HYPERTENSION: Status: ACTIVE | Noted: 2017-03-25

## 2022-03-20 PROBLEM — K57.30 DIVERTICULOSIS OF LARGE INTESTINE WITHOUT HEMORRHAGE: Status: ACTIVE | Noted: 2017-03-25

## 2022-04-14 ENCOUNTER — APPOINTMENT (OUTPATIENT)
Dept: INTERNAL MEDICINE CLINIC | Age: 82
End: 2022-04-14

## 2022-04-14 ENCOUNTER — HOSPITAL ENCOUNTER (OUTPATIENT)
Dept: LAB | Age: 82
Discharge: HOME | End: 2022-04-14
Payer: MEDICARE

## 2022-04-14 DIAGNOSIS — Z86.39 HISTORY OF VITAMIN D DEFICIENCY: ICD-10-CM

## 2022-04-14 DIAGNOSIS — I10 ESSENTIAL HYPERTENSION: ICD-10-CM

## 2022-04-14 DIAGNOSIS — E66.3 OVERWEIGHT (BMI 25.0-29.9): ICD-10-CM

## 2022-04-14 DIAGNOSIS — R42 VERTIGO: ICD-10-CM

## 2022-04-14 DIAGNOSIS — R47.9 TRANSIENT SPEECH DISTURBANCE: ICD-10-CM

## 2022-04-14 DIAGNOSIS — B35.1 ONYCHOMYCOSIS: ICD-10-CM

## 2022-04-14 DIAGNOSIS — J30.89 NON-SEASONAL ALLERGIC RHINITIS, UNSPECIFIED TRIGGER: ICD-10-CM

## 2022-04-14 DIAGNOSIS — H81.20 VESTIBULAR NEURONITIS, UNSPECIFIED LATERALITY: ICD-10-CM

## 2022-04-14 DIAGNOSIS — E78.5 HYPERLIPIDEMIA, UNSPECIFIED HYPERLIPIDEMIA TYPE: ICD-10-CM

## 2022-04-14 DIAGNOSIS — K57.30 DIVERTICULOSIS OF LARGE INTESTINE WITHOUT HEMORRHAGE: ICD-10-CM

## 2022-04-14 DIAGNOSIS — R73.01 IMPAIRED FASTING GLUCOSE: ICD-10-CM

## 2022-04-14 DIAGNOSIS — Z12.11 COLON CANCER SCREENING: ICD-10-CM

## 2022-04-14 DIAGNOSIS — Z12.5 PROSTATE CANCER SCREENING: ICD-10-CM

## 2022-04-14 DIAGNOSIS — E55.9 VITAMIN D DEFICIENCY, UNSPECIFIED: ICD-10-CM

## 2022-04-14 DIAGNOSIS — Z23 ENCOUNTER FOR IMMUNIZATION: ICD-10-CM

## 2022-04-14 LAB
25(OH)D3 SERPL-MCNC: 50.6 NG/ML (ref 30–100)
ALBUMIN SERPL-MCNC: 3.8 G/DL (ref 3.4–5)
ALBUMIN/GLOB SERPL: 1.5 {RATIO} (ref 0.8–1.7)
ALP SERPL-CCNC: 78 U/L (ref 45–117)
ALT SERPL-CCNC: 34 U/L (ref 16–61)
ANION GAP SERPL CALC-SCNC: 5 MMOL/L (ref 3–18)
APPEARANCE UR: CLEAR
AST SERPL-CCNC: 20 U/L (ref 10–38)
BACTERIA URNS QL MICRO: ABNORMAL /HPF
BASOPHILS # BLD: 0.1 K/UL (ref 0–0.1)
BASOPHILS NFR BLD: 1 % (ref 0–2)
BILIRUB SERPL-MCNC: 1.1 MG/DL (ref 0.2–1)
BILIRUB UR QL: NEGATIVE
BUN SERPL-MCNC: 12 MG/DL (ref 7–18)
BUN/CREAT SERPL: 13 (ref 12–20)
CALCIUM SERPL-MCNC: 9.2 MG/DL (ref 8.5–10.1)
CHLORIDE SERPL-SCNC: 110 MMOL/L (ref 100–111)
CHOLEST SERPL-MCNC: 156 MG/DL
CO2 SERPL-SCNC: 29 MMOL/L (ref 21–32)
COLOR UR: YELLOW
CREAT SERPL-MCNC: 0.94 MG/DL (ref 0.6–1.3)
DIFFERENTIAL METHOD BLD: NORMAL
EOSINOPHIL # BLD: 0.1 K/UL (ref 0–0.4)
EOSINOPHIL NFR BLD: 3 % (ref 0–5)
EPITH CASTS URNS QL MICRO: ABNORMAL /LPF (ref 0–5)
ERYTHROCYTE [DISTWIDTH] IN BLOOD BY AUTOMATED COUNT: 13.5 % (ref 11.6–14.5)
EST. AVERAGE GLUCOSE BLD GHB EST-MCNC: 105 MG/DL
GLOBULIN SER CALC-MCNC: 2.6 G/DL (ref 2–4)
GLUCOSE SERPL-MCNC: 94 MG/DL (ref 74–99)
GLUCOSE UR STRIP.AUTO-MCNC: NEGATIVE MG/DL
HBA1C MFR BLD: 5.3 % (ref 4.2–5.6)
HCT VFR BLD AUTO: 45.4 % (ref 36–48)
HDLC SERPL-MCNC: 47 MG/DL (ref 40–60)
HDLC SERPL: 3.3 {RATIO} (ref 0–5)
HGB BLD-MCNC: 15.3 G/DL (ref 13–16)
HGB UR QL STRIP: NEGATIVE
IMM GRANULOCYTES # BLD AUTO: 0 K/UL (ref 0–0.04)
IMM GRANULOCYTES NFR BLD AUTO: 0 % (ref 0–0.5)
KETONES UR QL STRIP.AUTO: ABNORMAL MG/DL
LDLC SERPL CALC-MCNC: 94 MG/DL (ref 0–100)
LEUKOCYTE ESTERASE UR QL STRIP.AUTO: ABNORMAL
LIPID PROFILE,FLP: NORMAL
LYMPHOCYTES # BLD: 1.2 K/UL (ref 0.9–3.6)
LYMPHOCYTES NFR BLD: 22 % (ref 21–52)
MAGNESIUM SERPL-MCNC: 2.2 MG/DL (ref 1.6–2.6)
MCH RBC QN AUTO: 31 PG (ref 24–34)
MCHC RBC AUTO-ENTMCNC: 33.7 G/DL (ref 31–37)
MCV RBC AUTO: 92.1 FL (ref 78–100)
MONOCYTES # BLD: 0.5 K/UL (ref 0.05–1.2)
MONOCYTES NFR BLD: 9 % (ref 3–10)
NEUTS SEG # BLD: 3.6 K/UL (ref 1.8–8)
NEUTS SEG NFR BLD: 65 % (ref 40–73)
NITRITE UR QL STRIP.AUTO: NEGATIVE
NRBC # BLD: 0 K/UL (ref 0–0.01)
NRBC BLD-RTO: 0 PER 100 WBC
PH UR STRIP: 5.5 [PH] (ref 5–8)
PLATELET # BLD AUTO: 199 K/UL (ref 135–420)
PMV BLD AUTO: 9.7 FL (ref 9.2–11.8)
POTASSIUM SERPL-SCNC: 4.3 MMOL/L (ref 3.5–5.5)
PROT SERPL-MCNC: 6.4 G/DL (ref 6.4–8.2)
PROT UR STRIP-MCNC: NEGATIVE MG/DL
PSA SERPL-MCNC: 2.4 NG/ML (ref 0–4)
RBC # BLD AUTO: 4.93 M/UL (ref 4.35–5.65)
RBC #/AREA URNS HPF: ABNORMAL /HPF (ref 0–5)
SODIUM SERPL-SCNC: 144 MMOL/L (ref 136–145)
SP GR UR REFRACTOMETRY: 1.02 (ref 1–1.03)
TRIGL SERPL-MCNC: 75 MG/DL (ref ?–150)
TSH SERPL DL<=0.05 MIU/L-ACNC: 1.87 UIU/ML (ref 0.36–3.74)
UROBILINOGEN UR QL STRIP.AUTO: 1 EU/DL (ref 0.2–1)
VLDLC SERPL CALC-MCNC: 15 MG/DL
WBC # BLD AUTO: 5.5 K/UL (ref 4.6–13.2)
WBC URNS QL MICRO: ABNORMAL /HPF (ref 0–4)

## 2022-04-14 PROCEDURE — 36415 COLL VENOUS BLD VENIPUNCTURE: CPT

## 2022-04-14 PROCEDURE — 84443 ASSAY THYROID STIM HORMONE: CPT

## 2022-04-14 PROCEDURE — 85025 COMPLETE CBC W/AUTO DIFF WBC: CPT

## 2022-04-14 PROCEDURE — 81001 URINALYSIS AUTO W/SCOPE: CPT

## 2022-04-14 PROCEDURE — 80061 LIPID PANEL: CPT

## 2022-04-14 PROCEDURE — 83735 ASSAY OF MAGNESIUM: CPT

## 2022-04-14 PROCEDURE — 80053 COMPREHEN METABOLIC PANEL: CPT

## 2022-04-14 PROCEDURE — 84153 ASSAY OF PSA TOTAL: CPT

## 2022-04-14 PROCEDURE — 82306 VITAMIN D 25 HYDROXY: CPT

## 2022-04-14 PROCEDURE — 83036 HEMOGLOBIN GLYCOSYLATED A1C: CPT

## 2022-04-20 NOTE — PROGRESS NOTES
1. \"Have you been to the ER, urgent care clinic since your last visit? Hospitalized since your last visit? \" No    2. \"Have you seen or consulted any other health care providers outside of the [address] [place] [address] since your last visit? \" No     3. For patients aged 39-70: Has the patient had a colonoscopy / FIT/ Cologuard? NA - based on age      If the patient is female:    4. For patients aged 41-77: Has the patient had a mammogram within the past 2 years? NA - based on age or sex      11. For patients aged 21-65: Has the patient had a pap smear?  NA - based on age or sex

## 2022-04-21 ENCOUNTER — OFFICE VISIT (OUTPATIENT)
Dept: INTERNAL MEDICINE CLINIC | Age: 82
End: 2022-04-21
Payer: MEDICARE

## 2022-04-21 VITALS
WEIGHT: 169 LBS | TEMPERATURE: 97.2 F | HEART RATE: 62 BPM | RESPIRATION RATE: 16 BRPM | BODY MASS INDEX: 27.16 KG/M2 | HEIGHT: 66 IN | SYSTOLIC BLOOD PRESSURE: 122 MMHG | OXYGEN SATURATION: 99 % | DIASTOLIC BLOOD PRESSURE: 76 MMHG

## 2022-04-21 DIAGNOSIS — I10 ESSENTIAL HYPERTENSION: Primary | ICD-10-CM

## 2022-04-21 DIAGNOSIS — E66.3 OVERWEIGHT (BMI 25.0-29.9): ICD-10-CM

## 2022-04-21 DIAGNOSIS — R47.9 TRANSIENT SPEECH DISTURBANCE: ICD-10-CM

## 2022-04-21 DIAGNOSIS — Z00.00 MEDICARE ANNUAL WELLNESS VISIT, SUBSEQUENT: ICD-10-CM

## 2022-04-21 DIAGNOSIS — E55.9 VITAMIN D DEFICIENCY, UNSPECIFIED: ICD-10-CM

## 2022-04-21 DIAGNOSIS — R73.01 IMPAIRED FASTING GLUCOSE: ICD-10-CM

## 2022-04-21 DIAGNOSIS — J30.89 NON-SEASONAL ALLERGIC RHINITIS, UNSPECIFIED TRIGGER: ICD-10-CM

## 2022-04-21 DIAGNOSIS — H81.20 VESTIBULAR NEURONITIS, UNSPECIFIED LATERALITY: ICD-10-CM

## 2022-04-21 DIAGNOSIS — Z12.5 PROSTATE CANCER SCREENING: ICD-10-CM

## 2022-04-21 DIAGNOSIS — E78.5 HYPERLIPIDEMIA, UNSPECIFIED HYPERLIPIDEMIA TYPE: ICD-10-CM

## 2022-04-21 DIAGNOSIS — Z13.31 SCREENING FOR DEPRESSION: ICD-10-CM

## 2022-04-21 DIAGNOSIS — Z71.89 ADVANCED DIRECTIVES, COUNSELING/DISCUSSION: ICD-10-CM

## 2022-04-21 PROCEDURE — G8754 DIAS BP LESS 90: HCPCS | Performed by: INTERNAL MEDICINE

## 2022-04-21 PROCEDURE — 1101F PT FALLS ASSESS-DOCD LE1/YR: CPT | Performed by: INTERNAL MEDICINE

## 2022-04-21 PROCEDURE — G8536 NO DOC ELDER MAL SCRN: HCPCS | Performed by: INTERNAL MEDICINE

## 2022-04-21 PROCEDURE — 99497 ADVNCD CARE PLAN 30 MIN: CPT | Performed by: INTERNAL MEDICINE

## 2022-04-21 PROCEDURE — 99214 OFFICE O/P EST MOD 30 MIN: CPT | Performed by: INTERNAL MEDICINE

## 2022-04-21 PROCEDURE — G8752 SYS BP LESS 140: HCPCS | Performed by: INTERNAL MEDICINE

## 2022-04-21 PROCEDURE — G8419 CALC BMI OUT NRM PARAM NOF/U: HCPCS | Performed by: INTERNAL MEDICINE

## 2022-04-21 PROCEDURE — G0439 PPPS, SUBSEQ VISIT: HCPCS | Performed by: INTERNAL MEDICINE

## 2022-04-21 PROCEDURE — G0463 HOSPITAL OUTPT CLINIC VISIT: HCPCS | Performed by: INTERNAL MEDICINE

## 2022-04-21 PROCEDURE — G8427 DOCREV CUR MEDS BY ELIG CLIN: HCPCS | Performed by: INTERNAL MEDICINE

## 2022-04-21 PROCEDURE — G8510 SCR DEP NEG, NO PLAN REQD: HCPCS | Performed by: INTERNAL MEDICINE

## 2022-04-21 RX ORDER — GUAIFENESIN 100 MG/5ML
81 LIQUID (ML) ORAL DAILY
COMMUNITY

## 2022-04-21 NOTE — PROGRESS NOTES
This is the Subsequent Medicare Annual Wellness Exam, performed 12 months or more after the Initial AWV or the last Subsequent AWV    I have reviewed the patient's medical history in detail and updated the computerized patient record. Assessment/Plan   Education and counseling provided:  Are appropriate based on today's review and evaluation  End-of-Life planning (with patient's consent)  Influenza Vaccine  Colorectal cancer screening tests  Cardiovascular screening blood test  Screening for glaucoma  Diabetes screening test  COVID 19 booster dose    1. Essential hypertension  -     CBC WITH AUTOMATED DIFF; Future  -     METABOLIC PANEL, COMPREHENSIVE; Future  -     URINALYSIS W/MICROSCOPIC; Future  2. Hyperlipidemia, unspecified hyperlipidemia type  -     CBC WITH AUTOMATED DIFF; Future  -     LIPID PANEL; Future  -     METABOLIC PANEL, COMPREHENSIVE; Future  -     URINALYSIS W/MICROSCOPIC; Future  3. Impaired fasting glucose  -     CBC WITH AUTOMATED DIFF; Future  -     HEMOGLOBIN A1C WITH EAG; Future  -     METABOLIC PANEL, COMPREHENSIVE; Future  4. Vestibular neuronitis, unspecified laterality  5. Transient speech disturbance  6. Non-seasonal allergic rhinitis, unspecified trigger  7. Overweight (BMI 25.0-29.9)  8. Vitamin D deficiency, unspecified   -     VITAMIN D, 25 HYDROXY; Future  9. Medicare annual wellness visit, subsequent  -     ADVANCE CARE PLANNING FIRST 30 MINS  10. Advanced directives, counseling/discussion  -     ADVANCE CARE PLANNING FIRST 27 MINS  11. Screening for depression  12. Prostate cancer screening  -     PSA SCREENING (SCREENING);  Future       Depression Risk Factor Screening     3 most recent PHQ Screens 4/21/2022   Little interest or pleasure in doing things Not at all   Feeling down, depressed, irritable, or hopeless Not at all   Total Score PHQ 2 0       Alcohol & Drug Abuse Risk Screen    Do you average more than 1 drink per night or more than 7 drinks a week: No    In the past three months have you have had more than 4 drinks containing alcohol on one occasion: No          Functional Ability and Level of Safety    Hearing: Hearing is good. Activities of Daily Living: The home contains: no safety equipment. Patient does total self care      Ambulation: with no difficulty     Fall Risk:  Fall Risk Assessment, last 12 mths 4/21/2022   Able to walk? Yes   Fall in past 12 months? 1   Do you feel unsteady? 1   Are you worried about falling 1   Number of falls in past 12 months 1   Fall with injury? 1      Abuse Screen:  Patient is not abused       Cognitive Screening    Has your family/caregiver stated any concerns about your memory: no     Cognitive Screening: none performed today    Health Maintenance Due     Health Maintenance Due   Topic Date Due    Shingrix Vaccine Age 49> (1 of 2) Never done       Patient Care Team   Patient Care Team:  Catracho Jordan MD as PCP - General (Internal Medicine)  Catracho Jordan MD as PCP - [address] [place] [address] [facility]  Gracia Denson MD (Ophthalmology)  Pancho Burt MD as Surgeon (Colon and Rectal Surgery)    History     Patient Active Problem List   Diagnosis Code    Hyperlipidemia E78.5    History of diverticulitis Z87.19    Colon polyps K63.5    Impaired fasting glucose R73.01    Diverticulosis of large intestine without hemorrhage K57.30    Essential hypertension I10    Vestibular neuronitis H81.20    History of vitamin D deficiency Z86.39    Transient speech disturbance R47.9    Allergic rhinitis J30.9    Overweight (BMI 25.0-29. 9) E66.3     Past Medical History:   Diagnosis Date    Abnormal glucose     Calculus of kidney     Colon polyps     Diverticulitis 02/2019    Diverticulosis of colon 03/2016    Colonoscopy (3/2016) advanced left sided diverticulosis    Essential hypertension 3/25/2017    pt denies    Hyperlipidemia     Vertigo 07/2020      Past Surgical History:   Procedure Laterality Date    COLONOSCOPY N/A 12/15/2021    COLONOSCOPY with Polypectomies performed by Maria T Luna MD at [facility]   [place], DIAGNOSTIC      2011, Dr. Scott Tabares, colon polyps, was going yearly    HX BLEPHAROPLASTY      HX CATARACT REMOVAL  2012    both eyes    HX OTHER SURGICAL      cn    NV COLONOSCOPY W/BIOPSY SINGLE/MULTIPLE  3--16    Dr. Ayah Negron     Current Outpatient Medications   Medication Sig Dispense Refill    aspirin 81 mg chewable tablet Take 81 mg by mouth daily.  OTHER Neuriva daily for memory      atorvastatin (LIPITOR) 20 mg tablet TAKE 1 TABLET DAILY 90 Tablet 3    triprolidine/pseudoephedrine (ANTIHISTAMINE PO) Take  by mouth daily.  psyllium (METAMUCIL) powd Take  by mouth. 3 capsules      cholecalciferol, vitamin D3, (VITAMIN D3) 2,000 unit tab Take 2,000 Units by mouth daily. No Known Allergies    Family History   Problem Relation Age of Onset    OSTEOARTHRITIS Mother     Elevated Lipids Mother     Heart Disease Father      Social History     Tobacco Use    Smoking status: Former Smoker     Quit date: 1988     Years since quittin.4    Smokeless tobacco: Never Used   Substance Use Topics    Alcohol use:  Yes     Alcohol/week: 1.0 standard drink     Types: 1 Glasses of wine per week     Comment: glass of wine/week         Marcela Oneil MD

## 2022-04-21 NOTE — PATIENT INSTRUCTIONS
Heart-Healthy Diet: Care Instructions  Your Care Instructions     A heart-healthy diet has lots of vegetables, fruits, nuts, beans, and whole grains, and is low in salt. It limits foods that are high in saturated fat, such as meats, cheeses, and fried foods. It may be hard to change your diet, but even small changes can lower your risk of heart attack and heart disease. Follow-up care is a key part of your treatment and safety. Be sure to make and go to all appointments, and call your doctor if you are having problems. It's also a good idea to know your test results and keep a list of the medicines you take. How can you care for yourself at home? Watch your portions  · Learn what a serving is. A \"serving\" and a \"portion\" are not always the same thing. Make sure that you are not eating larger portions than are recommended. For example, a serving of pasta is ½ cup. A serving size of meat is 2 to 3 ounces. A 3-ounce serving is about the size of a deck of cards. Measure serving sizes until you are good at [place]" them. Keep in mind that restaurants often serve portions that are 2 or 3 times the size of one serving. · To keep your energy level up and keep you from feeling hungry, eat often but in smaller portions. · Eat only the number of calories you need to stay at a healthy weight. If you need to lose weight, eat fewer calories than your body burns (through exercise and other physical activity). Eat more fruits and vegetables  · Eat a variety of fruit and vegetables every day. Dark green, deep orange, red, or yellow fruits and vegetables are especially good for you. Examples include spinach, carrots, peaches, and berries. · Keep carrots, celery, and other veggies handy for snacks. Buy fruit that is in season and store it where you can see it so that you will be tempted to eat it. · Cook dishes that have a lot of veggies in them, such as stir-fries and soups.   Limit saturated and trans fat  · Read food labels, and try to avoid saturated and trans fats. They increase your risk of heart disease. · Use olive or canola oil when you cook. · Bake, broil, grill, or steam foods instead of frying them. · Choose lean meats instead of high-fat meats such as hot dogs and sausages. Cut off all visible fat when you prepare meat. · Eat fish, skinless poultry, and meat alternatives such as soy products instead of high-fat meats. Soy products, such as tofu, may be especially good for your heart. · Choose low-fat or fat-free milk and dairy products. Eat foods high in fiber  · Eat a variety of grain products every day. Include whole-grain foods that have lots of fiber and nutrients. Examples of whole-grain foods include oats, whole wheat bread, and brown rice. · Buy whole-grain breads and cereals, instead of white bread or pastries. Limit salt and sodium  · Limit how much salt and sodium you eat to help lower your blood pressure. · Taste food before you salt it. Add only a little salt when you think you need it. With time, your taste buds will adjust to less salt. · Eat fewer snack items, fast foods, and other high-salt, processed foods. Check food labels for the amount of sodium in packaged foods. · Choose low-sodium versions of canned goods (such as soups, vegetables, and beans). Limit sugar  · Limit drinks and foods with added sugar. These include candy, desserts, and soda pop. Limit alcohol  · Limit alcohol to no more than 2 drinks a day for men and 1 drink a day for women. Too much alcohol can cause health problems. When should you call for help? Watch closely for changes in your health, and be sure to contact your doctor if:    · You would like help planning heart-healthy meals. Where can you learn more? Go to http://www.[employer].com/  Enter V137 in the search box to learn more about \"Heart-Healthy Diet: Care Instructions. \"  Current as of: August 22, 2019               Content Version: 12.6  © 1518-0814 [employer]. Care instructions adapted under license by [employer] (which disclaims liability or warranty for this information). If you have questions about a medical condition or this instruction, always ask your healthcare professional. [address] any warranty or liability for your use of this information. High Cholesterol: Care Instructions  Your Care Instructions     Cholesterol is a type of fat in your blood. It is needed for many body functions, such as making new cells. Cholesterol is made by your body. It also comes from food you eat. High cholesterol means that you have too much of the fat in your blood. This raises your risk of a heart attack and stroke. LDL and HDL are part of your total cholesterol. LDL is the \"bad\" cholesterol. High LDL can raise your risk for heart disease, heart attack, and stroke. HDL is the \"good\" cholesterol. It helps clear bad cholesterol from the body. High HDL is linked with a lower risk of heart disease, heart attack, and stroke. Your cholesterol levels help your doctor find out your risk for having a heart attack or stroke. You and your doctor can talk about whether you need to lower your risk and what treatment is best for you. A heart-healthy lifestyle along with medicines can help lower your cholesterol and your risk. The way you choose to lower your risk will depend on how high your risk is for heart attack and stroke. It will also depend on how you feel about taking medicines. Follow-up care is a key part of your treatment and safety. Be sure to make and go to all appointments, and call your doctor if you are having problems. It's also a good idea to know your test results and keep a list of the medicines you take. How can you care for yourself at home? · Eat a variety of foods every day.  Good choices include fruits, vegetables, whole grains (like oatmeal), dried beans and peas, nuts and seeds, soy products (like tofu), and fat-free or low-fat dairy products. · Replace butter, margarine, and hydrogenated or partially hydrogenated oils with olive and canola oils. (Canola oil margarine without trans fat is fine.)  · Replace red meat with fish, poultry, and soy protein (like tofu). · Limit processed and packaged foods like chips, crackers, and cookies. · Bake, broil, or steam foods. Don't ga them. · Be physically active. Get at least 30 minutes of exercise on most days of the week. Walking is a good choice. You also may want to do other activities, such as running, swimming, cycling, or playing tennis or team sports. · Stay at a healthy weight or lose weight by making the changes in eating and physical activity listed above. Losing just a small amount of weight, even 5 to 10 pounds, can reduce your risk for having a heart attack or stroke. · Do not smoke. When should you call for help? Watch closely for changes in your health, and be sure to contact your doctor if:    · You need help making lifestyle changes. · You have questions about your medicine. Where can you learn more? Go to http://www.gray.com/  Enter Z278 in the search box to learn more about \"High Cholesterol: Care Instructions. \"  Current as of: December 16, 2019               Content Version: 12.6  © 2098-6738 [employer]. Care instructions adapted under license by [employer] (which disclaims liability or warranty for this information). If you have questions about a medical condition or this instruction, always ask your healthcare professional. [place] [address] any warranty or liability for your use of this information. Medicare Wellness Visit, Male    The best way to improve and maintain good health is to have a healthy lifestyle by eating a well-balanced diet, exercising regularly, limiting alcohol and stopping smoking.     Regular visits with your physician or non-physician health care provider also support your good health. Preventive screening tests can find health problems before they become diseases or illnesses. Preventive services such as immunizations prevent serious infections. All people over age 72 should have a Pneumovax and a Prevnar-13 shot to prevent potentially life threatening infections with the pneumococcus bacteria, a common cause of pneumonia. These are once in a lifetime unless you and your provider decide differently. All people over 65 should have a yearly influenza vaccine or \"flu\" shot. This does not prevent infection with cold viruses but has been proven to prevent hospitalization and death from influenza. Although Medicare part B \"regular Medicare\" currently only covers tetanus vaccination in the context of an injury, a tetanus vaccine (Tdap or Td) is recommended every 10 years. A shingles vaccine is recommended once in a lifetime after age 61. The Shingles vaccine is also not covered by Medicare part B. Note, however, that both the Shingles vaccine and Tdap/Td are generally covered by secondary carriers. Please check your coverage and out of pocket expenses. Consider contacting your local health department because it may stock these vaccines for a reasonable charge.     We currently have documentation of the following immunization history for you:  Immunization History   Administered Date(s) Administered    (RETIRED) Pneumococcal Vaccine (Unspecified Type) 01/01/2005    COVID-19, Pfizer Purple top, DILUTE for use, 12+ yrs, 30mcg/0.3mL dose 02/02/2021, 02/23/2021, 10/14/2021, 04/19/2022    Influenza High Dose Vaccine PF 11/01/2013, 11/02/2016, 09/23/2017, 10/02/2018    Influenza Vaccine 10/31/2014, 10/11/2015    Influenza Vaccine Split 10/17/2011, 10/23/2012    Influenza Vaccine Whole 10/12/2010    Pneumococcal Conjugate (PCV-13) 01/05/2015    Pneumococcal Polysaccharide (PPSV-23) 01/01/2005, 04/13/2021    TD Vaccine 01/01/2009    Tdap 11/07/2017    Zoster 01/01/2009       Screening for infection with Hepatitis C is recommended for anyone born between [birth year] through 1965. The table at the bottom of this document indicates the status of this and other screening services. Screening for diabetes mellitus with a blood sugar test (glucose) should be done at least every 3 years until age 79. You and your health care provider may decide whether to continue screening after age 79. The most recent blood glucose we have on file for you is:   Lab Results   Component Value Date/Time    Glucose 94 04/14/2022 09:31 AM       Glaucoma is a disease of the eye due to increased ocular pressure that can lead to blindness. People with risk factors for glaucoma ( race, diabetes, family history) should be screened at least every 2 years by an eye professional. This may be covered annually if indicated as determined by you and your doctor. Cardiovascular screening tests that check for elevated lipids or cholesterol (fatty part of blood) which can lead to heart disease and strokes should be done every 4-6 years through age 79. You and your health care provider may decide whether to continue screening after age 79. The most recent lipid panel we have on file for you is:   Lab Results   Component Value Date/Time    Cholesterol, total 156 04/14/2022 09:31 AM    HDL Cholesterol 47 04/14/2022 09:31 AM    LDL, calculated 94 04/14/2022 09:31 AM    VLDL, calculated 15 04/14/2022 09:31 AM    Triglyceride 75 04/14/2022 09:31 AM    CHOL/HDL Ratio 3.3 04/14/2022 09:31 AM       Colorectal cancer screening that evaluates for blood or polyps in your colon for people with average risk should be done yearly as a stool test, every five years as a flexible sigmoidoscope or every 10 years as a colonoscopy up to age 76. You and your health care provider may decide whether to continue screening after age 76.     Men up to age 76 may elect to screen for prostate cancer with a blood test called a PSA at certain intervals, depending on their personal and family history. This decision is between the patient and his provider. The most recent PSA values we have on file for you are:  Lab Results   Component Value Date/Time    Prostate Specific Ag 2.4 04/14/2022 09:31 AM    Prostate Specific Ag 1.0 03/31/2020 08:32 AM    Prostate Specific Ag 1.4 03/26/2019 08:32 AM       If you have been a smoker or had family history of abdominal aortic aneurysms, you and your provider may decide to schedule an ultrasound test of your aorta. Our records show this was done on:  n/a    People who have smoked the equivalent of 1 pack per day for 30 years or more may benefit from screening for lung cancer with a yearly low dose CT scan until they have been non smokers for 15 years or competing health conditions render this unlikely to be beneficial. Our records show: n/a    Your Medicare Wellness Exam is recommended annually.     Here is a list of your current Health Maintenance items with a due date:  Health Maintenance   Topic Date Due    Shingrix Vaccine Age 49> (1 of 2) Never done    Depression Screen  04/13/2022    Flu Vaccine (Season Ended) 09/01/2022    Lipid Screen  04/14/2023    Medicare Yearly Exam  04/22/2023    DTaP/Tdap/Td series (2 - Td or Tdap) 11/07/2027    COVID-19 Vaccine  Completed    Pneumococcal 65+ years  Completed

## 2022-04-21 NOTE — ACP (ADVANCE CARE PLANNING)
Advance Care Planning     Advance Care Planning (ACP) Physician/NP/PA Conversation      Date of Conversation: 4/21/2022  Conducted with: Patient with [address] Decision Maker:     Primary Decision Maker: Amirah Bennett - [phone]    Secondary Decision Maker: Rosemary Millan - Daughter - [phone]  Click here to complete [place] including selection of the Healthcare Decision Maker Relationship (ie \"Primary\")        Today we documented Decision Maker(s) consistent with Legal Next of Kin hierarchy. Care Preferences:    Hospitalization: \"If your health worsens and it becomes clear that your chance of recovery is unlikely, what would be your preference regarding hospitalization? \"  The patient would prefer hospitalization. Ventilation: \"If you were unable to breathe on your own and your chance of recovery was unlikely, what would be your preference about the use of a ventilator (breathing machine) if it was available to you? \"   The patient would desire the use of a ventilator. Resuscitation: \"In the event your heart stopped as a result of an underlying serious health condition, would you want attempts to be made to restart your heart, or would you prefer a natural death? \"   Yes, attempt to resuscitate.     Additional topics discussed: treatment goals, benefit/burden of treatment options, ventilation preferences, hospitalization preferences, resuscitation preferences and end of life care preferences (vegetative state/imminent death)    Conversation Outcomes / Follow-Up Plan:   ACP complete - no further action today  Reviewed DNR/DNI and patient elects Full Code (Attempt Resuscitation)     Length of Voluntary ACP Conversation in minutes:  16 minutes    Pranav Oliveros MD

## 2022-04-23 PROBLEM — M25.561 ACUTE PAIN OF RIGHT KNEE: Status: RESOLVED | Noted: 2020-04-07 | Resolved: 2022-04-23

## 2022-04-23 PROBLEM — R42 VERTIGO: Status: RESOLVED | Noted: 2021-04-16 | Resolved: 2022-04-23

## 2022-04-23 NOTE — PROGRESS NOTES
HPI:   Trever Zepeda is a 80y.o. year old male who presents today for a physical exam. He has a history of hypertension, hyperlipidemia, impaired fasting glucose, vertigo, colonic adenomas, and diverticulosis. He has completed the [employer] COVID-19 vaccine series and received two Pfizer booster doses. He reports that he is doing reasonably well. He is continuing to exercise at [employer] 3 days a week performing cardio exercises and weight training. He reports intermittent mild symptoms of vertigo associated with increased tinnitus which seem to occur mostly upon awakening in the morning and resolve quickly. He will use meclizine as needed with improvement. He also reports that he sustained a fall in 10/2021 after tripping while walking on concrete. He states he sustained injury to his right knee and left elbow, but did not seek medical attention. He reports gradual improvement with conservative measures. He underwent a repeat screening colonoscopy on 12/15/2021 by Dr. Bear Rosario showing seven 5- 7 mm sessile polyps in the cecum, ascending colon, transverse colon, and sigmoid (pathology: tubular adenomas). Repeat colonoscopy recommended for 3 years. He is otherwise without new complaints and overall feeling well. Summary of prior hospitalizations and medical history:  He reported an episode in 12/2020 while a passenger in a car where he was unable to speak for 1-2 minutes. He states that he was aware that the episode was occurring, but he could not formulate words or a sentence. He stated that following the episode, he returned to baseline. He denied any visual changes, focal deficits, or palpitations. He underwent a brain MRI and carotid duplex ultrasound (5/2021) which were unremarkable. He remains on aspirin and statin, and reports no recurrence of symptoms. On 7/3/2020, he contacted on call Dr. Osiris Hardy and complained of nasal congestion with intermittent dizziness.  He was instructed to use Sudafed, and was prescribed amoxicillin. However, he became concerned that his dizziness became worse, and presented to the [facility] ED on 7/5/2020. AN EKG was performed, showing sinus rhythm at 63 bpm, normal intervals, and without ischemic changes. Labs showed Hb 15.8/ Hct 45.8, WBC 5.7, creatinine 0.94/ eGFR >60, K 3.8, troponin negative, and NT pro-. He was discharged. He described his dizziness as a \"spinning sensation\", and admitted to worsening tinnitus and nausea. He was prescribed meclizine with improvement. He has a history of hypertension, not requiring treatment with medication. He states that he monitors his blood pressure only occasionally at home, and reports most readings 120-130/ 70-80. He exercises regularly, going to the gym three times per week doing cardio for 30 minutes and light weights. He denies any chest pain, shortness of breath, lightheadedness, palpitations, edema, PND, or orthopnea. He also has a history of hyperlipidemia, treated with moderate intensity does atorvastatin. He was evaluated by LORI Leija on 2/1/2019 for complaint of episodes of indigestion accompanied by dyspnea and mild dizziness. He denied any typical chest pain, palpitations, edema, orthopnea, or signs of thrombosis. He also denied an association with exertion, continuing to exercise on treadmill for 1.5 mile without symptoms. Evaluation included EKG showing sinus bradycardia at 54 bpm and no ischemic changes. CBC and CMP were normal. He underwent a pharmacologic nuclear stress test (2/6/2019) which was a negative, low risk study without evidence of ischemia or prior infarction and normal LV function (EF 79%). He has a history of diverticulosis, and has had multiple episodes of diverticulitis and the past. He had a screening colonoscopy in 3/2016 by Dr. Shawn Wright which showed advanced left-sided diverticulosis, and polyps in the cecum and ascending colon (pathology: tubular adenomas).  Follow up recommended for five years. In 2/2019, he presented with left sided abdominal pain, similar to prior episodes of diverticulitis, so he was prescribed Cipro and Flagyl for 10 days. He was seen again on 3/12/2019 for persistent left sided abdominal pain, but denied any fever, chills, nausea, vomiting, melena, or hematochezia. He had lost nine pounds over the last year by watching his diet, and did not report decreased appetite and fatigue. He underwent an abdominal CT scan (3/19/2019) which showed no acute findings to explain his symptoms, significant distal colonic diverticulosis without diverticulitis, and an infrarenal AAA showing mild increase in caliber from 2.5 to 2.9 cm since a CT scan in 2010. He also was restarted on omeprazole with significant improvement. He denies any abdominal pain, nausea, vomiting, melena, hematochezia, or change in bowel habits. He has a history of impaired fasting glucose glucose, with fasting blood sugar ranging from 102-106 from 5987-9414 with a HbA1c of 5.8 at that time. Since 2014, however, his blood sugar and HbA1c have been in the normal range. He reports that this has improved since he is no longer drinking soda and has been watching his diet and weight. He has a regular eye exams with Dr. Octavia Moulton. He denies any polyuria, polydipsia, nocturia, or blurry vision, and has no history of retinopathy, neuropathy, or nephropathy.       Past Medical History:   Diagnosis Date    Abnormal glucose     Calculus of kidney     Colon polyps     Diverticulitis 02/2019    Diverticulosis of colon 03/2016    Colonoscopy (3/2016) advanced left sided diverticulosis    Essential hypertension 3/25/2017    pt denies    Hyperlipidemia     Vertigo 07/2020     Past Surgical History:   Procedure Laterality Date    COLONOSCOPY N/A 12/15/2021    COLONOSCOPY with Polypectomies performed by Salvador Albert MD at [address] [place] [address], DIAGNOSTIC      Jan 2011, Dr. Padma Soto, colon polyps, was going yearly    HX BLEPHAROPLASTY      HX CATARACT REMOVAL  2012    both eyes    HX OTHER SURGICAL      cn    [place] COLONOSCOPY W/BIOPSY SINGLE/MULTIPLE  3--16    Dr. Lida Reaves     Current Outpatient Medications   Medication Sig    aspirin 81 mg chewable tablet Take 81 mg by mouth daily.  OTHER Neuriva daily for memory    atorvastatin (LIPITOR) 20 mg tablet TAKE 1 TABLET DAILY    triprolidine/pseudoephedrine (ANTIHISTAMINE PO) Take  by mouth daily.  psyllium (METAMUCIL) powd Take  by mouth. 3 capsules    cholecalciferol, vitamin D3, (VITAMIN D3) 2,000 unit tab Take 2,000 Units by mouth daily. No current facility-administered medications for this visit. Allergies and Intolerances:   No Known Allergies     Family History: His father  had CABG and  from an MI. His mother  at the age of 80. He has no family history of colon or prostate cancer. Family History   Problem Relation Age of Onset    OSTEOARTHRITIS Mother     Elevated Lipids Mother     Heart Disease Father         Social History:   He  reports that he quit smoking about 33 years ago. He has never used smokeless tobacco. He smoked 1 ppd for 15 years, stopping in . He is  and has one daughter, Ramirez Deal. He is a retired [employer]  and is self employed as an .      Social History     Substance and Sexual Activity   Alcohol Use Yes    Alcohol/week: 1.0 standard drink    Types: 1 Glasses of wine per week    Comment: glass of wine/week     Immunization History:  Immunization History   Administered Date(s) Administered    (RETIRED) Pneumococcal Vaccine (Unspecified Type) 2005    COVID-19, Pfizer Purple top, DILUTE for use, 12+ yrs, 30mcg/0.3mL dose 2021, 2021, 10/14/2021, 2022    Influenza High Dose Vaccine PF 2013, 2016, 2017, 10/02/2018    Influenza Vaccine 10/31/2014, 10/11/2015    Influenza Vaccine Split 10/17/2011, 10/23/2012    Influenza Vaccine Whole 10/12/2010    Pneumococcal Conjugate (PCV-13) 01/05/2015    Pneumococcal Polysaccharide (PPSV-23) 01/01/2005, 04/13/2021    TD Vaccine 01/01/2009    Tdap 11/07/2017    Zoster 01/01/2009       Review of Systems:   As above included in HPI. Otherwise 11 point review of systems negative including constitutional, skin, HENT, eyes, respiratory, cardiovascular, gastrointestinal, genitourinary, musculoskeletal, endocrine, hematologic, allergy, and neurologic. Physical:   Visit Vitals  /76 (BP 1 Location: Right arm, BP Patient Position: Sitting)   Pulse 62   Temp 97.2 °F (36.2 °C) (Temporal)   Resp 16   Ht 5' 6\" (1.676 m)   Wt 169 lb (76.7 kg)   SpO2 99%   BMI 27.28 kg/m²       Exam:   Patient appears in no apparent distress. Affect is appropriate. HEENT --Anicteric sclerae, tympanic membranes normal,  ear canals normal.  PERRL, EOMI, conjunctiva and lids normal.  No cervical lymphadenopathy. No thyromegaly, JVD, or bruits. Carotid pulses 2+ with normal upstroke. Lungs --Clear to auscultation. No wheezing or rales. Heart --Regular rate and rhythm, no murmurs, rubs, gallops, or clicks. Abdomen -- Soft and nontender, no hepatosplenomegaly or masses. Extremities -- Without cyanosis, clubbing, edema.  Normal looking digits, ROM intact  Neuro -- CN 2-12 intact, strength 5/5 with intact soft touch in all extremities  Derm - no obvious abnormalities noted, no rash        Review of Data:  Labs:  Hospital Outpatient Visit on 04/14/2022   Component Date Value Ref Range Status    WBC 04/14/2022 5.5  4.6 - 13.2 K/uL Final    RBC 04/14/2022 4.93  4.35 - 5.65 M/uL Final    HGB 04/14/2022 15.3  13.0 - 16.0 g/dL Final    HCT 04/14/2022 45.4  36.0 - 48.0 % Final    MCV 04/14/2022 92.1  78.0 - 100.0 FL Final    MCH 04/14/2022 31.0  24.0 - 34.0 PG Final    MCHC 04/14/2022 33.7  31.0 - 37.0 g/dL Final    RDW 04/14/2022 13.5  11.6 - 14.5 % Final    PLATELET 80/65/5400 320  135 - 420 K/uL Final    MPV 04/14/2022 9.7  9.2 - 11.8 FL Final    NRBC 04/14/2022 0.0  0  WBC Final    ABSOLUTE NRBC 04/14/2022 0.00  0.00 - 0.01 K/uL Final    NEUTROPHILS 04/14/2022 65  40 - 73 % Final    LYMPHOCYTES 04/14/2022 22  21 - 52 % Final    MONOCYTES 04/14/2022 9  3 - 10 % Final    EOSINOPHILS 04/14/2022 3  0 - 5 % Final    BASOPHILS 04/14/2022 1  0 - 2 % Final    IMMATURE GRANULOCYTES 04/14/2022 0  0.0 - 0.5 % Final    ABS. NEUTROPHILS 04/14/2022 3.6  1.8 - 8.0 K/UL Final    ABS. LYMPHOCYTES 04/14/2022 1.2  0.9 - 3.6 K/UL Final    ABS. MONOCYTES 04/14/2022 0.5  0.05 - 1.2 K/UL Final    ABS. EOSINOPHILS 04/14/2022 0.1  0.0 - 0.4 K/UL Final    ABS. BASOPHILS 04/14/2022 0.1  0.0 - 0.1 K/UL Final    ABS. IMM.  GRANS. 04/14/2022 0.0  0.00 - 0.04 K/UL Final    DF 04/14/2022 AUTOMATED    Final    Hemoglobin A1c 04/14/2022 5.3  4.2 - 5.6 % Final    Est. average glucose 04/14/2022 105  mg/dL Final    LIPID PROFILE 04/14/2022        Final    Cholesterol, total 04/14/2022 156  <200 MG/DL Final    Triglyceride 04/14/2022 75  <150 MG/DL Final    HDL Cholesterol 04/14/2022 47  40 - 60 MG/DL Final    LDL, calculated 04/14/2022 94  0 - 100 MG/DL Final    VLDL, calculated 04/14/2022 15  MG/DL Final    CHOL/HDL Ratio 04/14/2022 3.3  0 - 5.0   Final    Magnesium 04/14/2022 2.2  1.6 - 2.6 mg/dL Final    Sodium 04/14/2022 144  136 - 145 mmol/L Final    Potassium 04/14/2022 4.3  3.5 - 5.5 mmol/L Final    Chloride 04/14/2022 110  100 - 111 mmol/L Final    CO2 04/14/2022 29  21 - 32 mmol/L Final    Anion gap 04/14/2022 5  3.0 - 18 mmol/L Final    Glucose 04/14/2022 94  74 - 99 mg/dL Final    BUN 04/14/2022 12  7.0 - 18 MG/DL Final    Creatinine 04/14/2022 0.94  0.6 - 1.3 MG/DL Final    BUN/Creatinine ratio 04/14/2022 13  12 - 20   Final    GFR est AA 04/14/2022 >60  >60 ml/min/1.73m2 Final    GFR est non-AA 04/14/2022 >60  >60 ml/min/1.73m2 Final    Calcium 04/14/2022 9.2  8.5 - 10.1 MG/DL Final  Bilirubin, total 04/14/2022 1.1* 0.2 - 1.0 MG/DL Final    ALT (SGPT) 04/14/2022 34  16 - 61 U/L Final    AST (SGOT) 04/14/2022 20  10 - 38 U/L Final    Alk. phosphatase 04/14/2022 78  45 - 117 U/L Final    Protein, total 04/14/2022 6.4  6.4 - 8.2 g/dL Final    Albumin 04/14/2022 3.8  3.4 - 5.0 g/dL Final    Globulin 04/14/2022 2.6  2.0 - 4.0 g/dL Final    A-G Ratio 04/14/2022 1.5  0.8 - 1.7   Final    TSH 04/14/2022 1.87  0.36 - 3.74 uIU/mL Final    Vitamin D 25-Hydroxy 04/14/2022 50.6  30 - 100 ng/mL Final    Prostate Specific Ag 04/14/2022 2.4  0.0 - 4.0 ng/mL Final    Color 04/14/2022 YELLOW    Final    Appearance 04/14/2022 CLEAR    Final    Specific gravity 04/14/2022 1.024  1.005 - 1.030   Final    pH (UA) 04/14/2022 5.5  5.0 - 8.0   Final    Protein 04/14/2022 Negative  NEG mg/dL Final    Glucose 04/14/2022 Negative  NEG mg/dL Final    Ketone 04/14/2022 TRACE* NEG mg/dL Final    Bilirubin 04/14/2022 Negative  NEG   Final    Blood 04/14/2022 Negative  NEG   Final    Urobilinogen 04/14/2022 1.0  0.2 - 1.0 EU/dL Final    Nitrites 04/14/2022 Negative  NEG   Final    Leukocyte Esterase 04/14/2022 SMALL* NEG   Final    WBC 04/14/2022 5 to 9  0 - 4 /hpf Final    RBC 04/14/2022 NONE  0 - 5 /hpf Final    Epithelial cells 04/14/2022 FEW  0 - 5 /lpf Final    Bacteria 04/14/2022 FEW* NEG /hpf Final       Health Maintenance:  Screening:    Colorectal: colonoscopy (12/2021) tubular adenomas. Dr. Ning Mccollum.   Due 12/2024   Depression: none   DM (HbA1c/FPG): FPG 94/ HbA1c 5.3 (4/2022)   Hepatitis C: N/A   Falls: none   DEXA: N/A   PSA/SHANTELL: PSA 1.0 (3/2020)   Glaucoma: regular eye exams with Dr. 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 9) E66.3       Plan:  1. Transient speech disturbance. Reported an episode in 12/2020 of difficulty speaking for approximately two minutes. Aware of deficit but could not formulate words. Denied any visual changes or focal deficits associated with episode and denied any history of palpitations. Underwent brain MRI and carotid duplex ultrasound (5/2021) which were unremarkable. On aspirin and statin. Reports no recurrence of symptoms. 2. Vertigo. Patient reported worsening tinnintus and vertigo in 7/2020 suggestive of vestibular neuritis. Treated with meclizine with improvement. Reports recurrent episode in 3/2021 and responded to meclizine over several days. Underwent contrast brain MRI with negative IAC noted. Advised to continue meclizine as needed. 3. Hypertension. Remains well controlled without medication. Renal function remains normal with creatinine 0.94/ eGFR >60. Continue to follow. 4. Hyperlipidemia. On moderate intensity dose atorvastatin 20 mg daily with LDL 94 and HDL 47, indicative of reasonable control. Emphasized importance of continued lifestyle modifications, including diet and exercise. 5.  Impaired fasting glucose. Fasting glucose and HbA1c mildly elevated in past. However, improved with weight loss. Remain normal today despite 6 pound weight gain. Discussed importance of continued lifestyle modifications, including heart healthy diet, regular exercise, and weight loss. Will continue to follow. 6. Fall, sequela. Patient sustained a mechanical fall in 10/2021 resulting in right knee and left elbow injury with pain. Did not seek medical attention and reports gradual improvement. Fall precautions stressed. 7. Colon adenomas.   Underwent screening colonoscopy in 12/2021 with removal of 7 sessile polyps with pathology showing all to be tubular adenomas. Repeat colonoscopy recommended for 3 years (due 12/2024). 8. Diverticulosis. Known extensive left sided diverticulosis on prior colonoscopy. Has not had recurrent episode of diverticulitis in over 5 years. Continue high fiber diet and follow. 9. Overweight. Weight increased 6 pounds since last visit. Patient continuing to exercise at the gym 3 days/week. Emphasized importance of continued lifestyle modifications, including heart healthy diet and regular exercise. Will continue to monitor. 10. Health maintenance. Completed the [employer] COVID-19 vaccine series and received a Pfizer booster dose. Also received a second booster dose yet new CDC recommendations. Not willing to receive a [employer] vaccine due to cost. Other immunizations up to date. Completed colonoscopy and repeat due in 3 years. Prostate cancer screening up to date. Patient wishing to continue following PSA. Vitamin D level remains on maintenance dose supplement. Continue regular eye exams with Dr. Syed Fry. In addition, an annual Medicare wellness visit was done today. Patient understands recommendations and agrees with plan. Follow-up in 12 months.

## 2022-10-31 ENCOUNTER — TELEPHONE (OUTPATIENT)
Dept: INTERNAL MEDICINE CLINIC | Age: 82
End: 2022-10-31

## 2022-10-31 NOTE — TELEPHONE ENCOUNTER
Patient called and states that he has been having knee pain for the past 4-6 weeks. He states that he fell on his knee back in March and it healed, but he has been exercising and thinks he aggravated it. The pain is in his right knee, and the knee is slightly swollen with some numbness. No appts available, patient can be reached at [phone].   Please advise, thank you

## 2022-10-31 NOTE — TELEPHONE ENCOUNTER
Called and spoke with patient. Difficult to discern if pain is originating in his knee as describing pain throughout his entire right leg. Reports worsening with prolonged sitting. Reports improves initially with ambulation but will worsen as ambulation continues. Denies any low back or buttock pain. Also describing some numbness in his leg. Need to evaluate in the office. Please schedule patient in the 8 AM slot on 11/2/2022 which appears to have become available. Patient aware of appointment time and date so no need to call.

## 2022-11-02 ENCOUNTER — OFFICE VISIT (OUTPATIENT)
Dept: INTERNAL MEDICINE CLINIC | Age: 82
End: 2022-11-02
Payer: MEDICARE

## 2022-11-02 ENCOUNTER — HOSPITAL ENCOUNTER (OUTPATIENT)
Dept: GENERAL RADIOLOGY | Age: 82
Discharge: HOME | End: 2022-11-02
Payer: MEDICARE

## 2022-11-02 ENCOUNTER — HOSPITAL ENCOUNTER (OUTPATIENT)
Dept: LAB | Age: 82
Discharge: HOME | End: 2022-11-02
Payer: MEDICARE

## 2022-11-02 VITALS
HEART RATE: 56 BPM | RESPIRATION RATE: 16 BRPM | WEIGHT: 165 LBS | DIASTOLIC BLOOD PRESSURE: 70 MMHG | SYSTOLIC BLOOD PRESSURE: 128 MMHG | HEIGHT: 66 IN | TEMPERATURE: 97 F | BODY MASS INDEX: 26.52 KG/M2 | OXYGEN SATURATION: 98 %

## 2022-11-02 DIAGNOSIS — M17.11 PRIMARY OSTEOARTHRITIS OF RIGHT KNEE: ICD-10-CM

## 2022-11-02 DIAGNOSIS — I10 ESSENTIAL HYPERTENSION: ICD-10-CM

## 2022-11-02 DIAGNOSIS — E78.5 HYPERLIPIDEMIA, UNSPECIFIED HYPERLIPIDEMIA TYPE: ICD-10-CM

## 2022-11-02 DIAGNOSIS — M25.561 RIGHT KNEE PAIN, UNSPECIFIED CHRONICITY: Primary | ICD-10-CM

## 2022-11-02 DIAGNOSIS — E55.9 VITAMIN D DEFICIENCY, UNSPECIFIED: ICD-10-CM

## 2022-11-02 DIAGNOSIS — Z12.5 PROSTATE CANCER SCREENING: ICD-10-CM
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
## 2022-11-02 DIAGNOSIS — E66.3 OVERWEIGHT (BMI 25.0-29.9): ICD-10-CM

## 2022-11-02 DIAGNOSIS — R73.01 IMPAIRED FASTING GLUCOSE: ICD-10-CM

## 2022-11-02 DIAGNOSIS — M25.561 RIGHT KNEE PAIN, UNSPECIFIED CHRONICITY: ICD-10-CM

## 2022-11-02 LAB
25(OH)D3 SERPL-MCNC: 57.9 NG/ML (ref 30–100)
ALBUMIN SERPL-MCNC: 3.7 G/DL (ref 3.4–5)
ALBUMIN/GLOB SERPL: 1.3 {RATIO} (ref 0.8–1.7)
ALP SERPL-CCNC: 71 U/L (ref 45–117)
ALT SERPL-CCNC: 33 U/L (ref 16–61)
ANION GAP SERPL CALC-SCNC: 5 MMOL/L (ref 3–18)
APPEARANCE UR: CLEAR
AST SERPL-CCNC: 19 U/L (ref 10–38)
BACTERIA URNS QL MICRO: ABNORMAL /HPF
BASOPHILS # BLD: 0.1 K/UL (ref 0–0.1)
BASOPHILS NFR BLD: 1 % (ref 0–2)
BILIRUB SERPL-MCNC: 1.1 MG/DL (ref 0.2–1)
BILIRUB UR QL: NEGATIVE
BUN SERPL-MCNC: 13 MG/DL (ref 7–18)
BUN/CREAT SERPL: 14 (ref 12–20)
CALCIUM SERPL-MCNC: 9.2 MG/DL (ref 8.5–10.1)
CHLORIDE SERPL-SCNC: 109 MMOL/L (ref 100–111)
CHOLEST SERPL-MCNC: 139 MG/DL
CO2 SERPL-SCNC: 28 MMOL/L (ref 21–32)
COLOR UR: YELLOW
CREAT SERPL-MCNC: 0.92 MG/DL (ref 0.6–1.3)
DIFFERENTIAL METHOD BLD: ABNORMAL
EOSINOPHIL # BLD: 0.1 K/UL (ref 0–0.4)
EOSINOPHIL NFR BLD: 2 % (ref 0–5)
EPITH CASTS URNS QL MICRO: ABNORMAL /LPF (ref 0–5)
ERYTHROCYTE [DISTWIDTH] IN BLOOD BY AUTOMATED COUNT: 13.1 % (ref 11.6–14.5)
EST. AVERAGE GLUCOSE BLD GHB EST-MCNC: 111 MG/DL
GLOBULIN SER CALC-MCNC: 2.9 G/DL (ref 2–4)
GLUCOSE SERPL-MCNC: 100 MG/DL (ref 74–99)
GLUCOSE UR STRIP.AUTO-MCNC: NEGATIVE MG/DL
HBA1C MFR BLD: 5.5 % (ref 4.2–5.6)
HCT VFR BLD AUTO: 45 % (ref 36–48)
HDLC SERPL-MCNC: 43 MG/DL (ref 40–60)
HDLC SERPL: 3.2 {RATIO} (ref 0–5)
HGB BLD-MCNC: 15.3 G/DL (ref 13–16)
HGB UR QL STRIP: NEGATIVE
IMM GRANULOCYTES # BLD AUTO: 0 K/UL (ref 0–0.04)
IMM GRANULOCYTES NFR BLD AUTO: 0 % (ref 0–0.5)
KETONES UR QL STRIP.AUTO: NEGATIVE MG/DL
LDLC SERPL CALC-MCNC: 81.6 MG/DL (ref 0–100)
LEUKOCYTE ESTERASE UR QL STRIP.AUTO: ABNORMAL
LIPID PROFILE,FLP: NORMAL
LYMPHOCYTES # BLD: 1 K/UL (ref 0.9–3.6)
LYMPHOCYTES NFR BLD: 15 % (ref 21–52)
MCH RBC QN AUTO: 31.1 PG (ref 24–34)
MCHC RBC AUTO-ENTMCNC: 34 G/DL (ref 31–37)
MCV RBC AUTO: 91.5 FL (ref 78–100)
MONOCYTES # BLD: 0.5 K/UL (ref 0.05–1.2)
MONOCYTES NFR BLD: 8 % (ref 3–10)
NEUTS SEG # BLD: 4.8 K/UL (ref 1.8–8)
NEUTS SEG NFR BLD: 74 % (ref 40–73)
NITRITE UR QL STRIP.AUTO: NEGATIVE
NRBC # BLD: 0 K/UL (ref 0–0.01)
NRBC BLD-RTO: 0 PER 100 WBC
PH UR STRIP: 7 [PH] (ref 5–8)
PLATELET # BLD AUTO: 174 K/UL (ref 135–420)
PMV BLD AUTO: 9.2 FL (ref 9.2–11.8)
POTASSIUM SERPL-SCNC: 4.4 MMOL/L (ref 3.5–5.5)
PROT SERPL-MCNC: 6.6 G/DL (ref 6.4–8.2)
PROT UR STRIP-MCNC: NEGATIVE MG/DL
PSA SERPL-MCNC: 1.2 NG/ML (ref 0–4)
RBC # BLD AUTO: 4.92 M/UL (ref 4.35–5.65)
RBC #/AREA URNS HPF: ABNORMAL /HPF (ref 0–5)
SODIUM SERPL-SCNC: 142 MMOL/L (ref 136–145)
SP GR UR REFRACTOMETRY: 1.02 (ref 1–1.03)
TRIGL SERPL-MCNC: 72 MG/DL (ref ?–150)
UROBILINOGEN UR QL STRIP.AUTO: 0.2 EU/DL (ref 0.2–1)
VLDLC SERPL CALC-MCNC: 14.4 MG/DL
WBC # BLD AUTO: 6.4 K/UL (ref 4.6–13.2)
WBC URNS QL MICRO: ABNORMAL /HPF (ref 0–4)

## 2022-11-02 PROCEDURE — 1101F PT FALLS ASSESS-DOCD LE1/YR: CPT | Performed by: INTERNAL MEDICINE

## 2022-11-02 PROCEDURE — G0463 HOSPITAL OUTPT CLINIC VISIT: HCPCS | Performed by: INTERNAL MEDICINE

## 2022-11-02 PROCEDURE — G8510 SCR DEP NEG, NO PLAN REQD: HCPCS | Performed by: INTERNAL MEDICINE

## 2022-11-02 PROCEDURE — 80061 LIPID PANEL: CPT

## 2022-11-02 PROCEDURE — 81001 URINALYSIS AUTO W/SCOPE: CPT

## 2022-11-02 PROCEDURE — 73560 X-RAY EXAM OF KNEE 1 OR 2: CPT

## 2022-11-02 PROCEDURE — 1123F ACP DISCUSS/DSCN MKR DOCD: CPT | Performed by: INTERNAL MEDICINE

## 2022-11-02 PROCEDURE — G8536 NO DOC ELDER MAL SCRN: HCPCS | Performed by: INTERNAL MEDICINE

## 2022-11-02 PROCEDURE — 3078F DIAST BP <80 MM HG: CPT | Performed by: INTERNAL MEDICINE

## 2022-11-02 PROCEDURE — G8419 CALC BMI OUT NRM PARAM NOF/U: HCPCS | Performed by: INTERNAL MEDICINE

## 2022-11-02 PROCEDURE — 99214 OFFICE O/P EST MOD 30 MIN: CPT | Performed by: INTERNAL MEDICINE

## 2022-11-02 PROCEDURE — 82306 VITAMIN D 25 HYDROXY: CPT

## 2022-11-02 PROCEDURE — G8752 SYS BP LESS 140: HCPCS | Performed by: INTERNAL MEDICINE

## 2022-11-02 PROCEDURE — G8427 DOCREV CUR MEDS BY ELIG CLIN: HCPCS | Performed by: INTERNAL MEDICINE

## 2022-11-02 PROCEDURE — 3074F SYST BP LT 130 MM HG: CPT | Performed by: INTERNAL MEDICINE

## 2022-11-02 PROCEDURE — 84153 ASSAY OF PSA TOTAL: CPT

## 2022-11-02 PROCEDURE — 80053 COMPREHEN METABOLIC PANEL: CPT

## 2022-11-02 PROCEDURE — 83036 HEMOGLOBIN GLYCOSYLATED A1C: CPT

## 2022-11-02 PROCEDURE — G8754 DIAS BP LESS 90: HCPCS | Performed by: INTERNAL MEDICINE

## 2022-11-02 PROCEDURE — 36415 COLL VENOUS BLD VENIPUNCTURE: CPT

## 2022-11-02 PROCEDURE — 85025 COMPLETE CBC W/AUTO DIFF WBC: CPT

## 2022-11-02 RX ORDER — MELOXICAM 7.5 MG/1
7.5 TABLET ORAL DAILY
Qty: 30 TABLET | Refills: 1 | Status: SHIPPED | OUTPATIENT
Start: 2022-11-02

## 2022-11-02 NOTE — PATIENT INSTRUCTIONS
Heart-Healthy Diet: Care Instructions  Your Care Instructions     A heart-healthy diet has lots of vegetables, fruits, nuts, beans, and whole grains, and is low in salt. It limits foods that are high in saturated fat, such as meats, cheeses, and fried foods. It may be hard to change your diet, but even small changes can lower your risk of heart attack and heart disease. Follow-up care is a key part of your treatment and safety. Be sure to make and go to all appointments, and call your doctor if you are having problems. It's also a good idea to know your test results and keep a list of the medicines you take. How can you care for yourself at home? Watch your portions  Learn what a serving is. A \"serving\" and a \"portion\" are not always the same thing. Make sure that you are not eating larger portions than are recommended. For example, a serving of pasta is ½ cup. A serving size of meat is 2 to 3 ounces. A 3-ounce serving is about the size of a deck of cards. Measure serving sizes until you are good at [place]" them. Keep in mind that restaurants often serve portions that are 2 or 3 times the size of one serving. To keep your energy level up and keep you from feeling hungry, eat often but in smaller portions. Eat only the number of calories you need to stay at a healthy weight. If you need to lose weight, eat fewer calories than your body burns (through exercise and other physical activity). Eat more fruits and vegetables  Eat a variety of fruit and vegetables every day. Dark green, deep orange, red, or yellow fruits and vegetables are especially good for you. Examples include spinach, carrots, peaches, and berries. Keep carrots, celery, and other veggies handy for snacks. Buy fruit that is in season and store it where you can see it so that you will be tempted to eat it. Cook dishes that have a lot of veggies in them, such as stir-fries and soups.   Limit saturated and trans fat  Read food labels, and try to avoid saturated and trans fats. They increase your risk of heart disease. Use olive or canola oil when you cook. Bake, broil, grill, or steam foods instead of frying them. Choose lean meats instead of high-fat meats such as hot dogs and sausages. Cut off all visible fat when you prepare meat. Eat fish, skinless poultry, and meat alternatives such as soy products instead of high-fat meats. Soy products, such as tofu, may be especially good for your heart. Choose low-fat or fat-free milk and dairy products. Eat foods high in fiber  Eat a variety of grain products every day. Include whole-grain foods that have lots of fiber and nutrients. Examples of whole-grain foods include oats, whole wheat bread, and brown rice. Buy whole-grain breads and cereals, instead of white bread or pastries. Limit salt and sodium  Limit how much salt and sodium you eat to help lower your blood pressure. Taste food before you salt it. Add only a little salt when you think you need it. With time, your taste buds will adjust to less salt. Eat fewer snack items, fast foods, and other high-salt, processed foods. Check food labels for the amount of sodium in packaged foods. Choose low-sodium versions of canned goods (such as soups, vegetables, and beans). Limit sugar  Limit drinks and foods with added sugar. These include candy, desserts, and soda pop. Limit alcohol  Limit alcohol to no more than 2 drinks a day for men and 1 drink a day for women. Too much alcohol can cause health problems. When should you call for help? Watch closely for changes in your health, and be sure to contact your doctor if:    You would like help planning heart-healthy meals. Where can you learn more? Go to http://www.lockett.com/  Enter V137 in the search box to learn more about \"Heart-Healthy Diet: Care Instructions. \"  Current as of: August 22, 2019               Content Version: 12.6  © 3124-0095 [employer]. Care instructions adapted under license by [employer] (which disclaims liability or warranty for this information). If you have questions about a medical condition or this instruction, always ask your healthcare professional. [address] any warranty or liability for your use of this information.

## 2022-11-02 NOTE — PROGRESS NOTES
1. \"Have you been to the ER, urgent care clinic since your last visit? Hospitalized since your last visit? \" No    2. \"Have you seen or consulted any other health care providers outside of the [address] [place] [address] since your last visit? \" No     3. For patients aged 39-70: Has the patient had a colonoscopy / FIT/ Cologuard? NA - based on age      If the patient is female:    4. For patients aged 41-77: Has the patient had a mammogram within the past 2 years? NA - based on age or sex      11. For patients aged 21-65: Has the patient had a pap smear?  NA - based on age or sex

## 2022-11-02 NOTE — PROGRESS NOTES
Called and spoke with patient regarding x-ray results showing mild/moderate medial compartment osteoarthritis. Will proceed with plan to treat with meloxicam and refer to [facility] for further evaluation. Referral placed for Dr. Oumou Escobedo.

## 2022-11-06 NOTE — PROGRESS NOTES
HPI:   Rajani Hazel is a 80y.o. year old male who presents today for an acute visit. He has a history of hypertension, hyperlipidemia, impaired fasting glucose, vertigo, colonic adenomas, and diverticulosis. He has completed the [facility] COVID-19 vaccine series and received two Pfizer booster doses and an updated bivalent booster dose. He reports that he is doing reasonably well. At his last visit in 4/2022, he had reported that he sustained a fall in 10/2021 after tripping while walking on concrete. He reported sustaining injury to his right knee and left elbow but did not seek medical attention and noted gradual improvement with conservative measures. However, he reports onset of acute right knee pain 2 months ago after performing a forward lunge while exercising. He states that he initially applied ice to his right knee and began taking ibuprofen 400 mg twice daily without significant improvement. He also states no response to Tylenol ES. He reports that he recently returned from a cruise and was having difficulty ambulating due to his right knee pain. He also describes difficulty sleeping due to the discomfort. He is otherwise without new complaints and overall feeling well. Summary of prior hospitalizations and medical history:  He reported an episode in 12/2020 while a passenger in a car where he was unable to speak for 1-2 minutes. He states that he was aware that the episode was occurring, but he could not formulate words or a sentence. He stated that following the episode, he returned to baseline. He denied any visual changes, focal deficits, or palpitations. He underwent a brain MRI and carotid duplex ultrasound (5/2021) which were unremarkable. He remains on aspirin and statin, and reports no recurrence of symptoms. On 7/3/2020, he contacted on call Dr. Lydia Bryan and complained of nasal congestion with intermittent dizziness.  He was instructed to use Sudafed, and was prescribed amoxicillin. However, he became concerned that his dizziness became worse, and presented to the [facility] ED on 7/5/2020. AN EKG was performed, showing sinus rhythm at 63 bpm, normal intervals, and without ischemic changes. Labs showed Hb 15.8/ Hct 45.8, WBC 5.7, creatinine 0.94/ eGFR >60, K 3.8, troponin negative, and NT pro-. He was discharged. He described his dizziness as a \"spinning sensation\", and admitted to worsening tinnitus and nausea. He was prescribed meclizine with improvement. He has a history of hypertension, not requiring treatment with medication. He states that he monitors his blood pressure only occasionally at home, and reports most readings 120-130/ 70-80. He exercises regularly, going to the gym three times per week doing cardio for 30 minutes and light weights. He denies any chest pain, shortness of breath, lightheadedness, palpitations, edema, PND, or orthopnea. He also has a history of hyperlipidemia, treated with moderate intensity does atorvastatin. He was evaluated by LORI Khan on 2/1/2019 for complaint of episodes of indigestion accompanied by dyspnea and mild dizziness. He denied any typical chest pain, palpitations, edema, orthopnea, or signs of thrombosis. He also denied an association with exertion, continuing to exercise on treadmill for 1.5 mile without symptoms. Evaluation included EKG showing sinus bradycardia at 54 bpm and no ischemic changes. CBC and CMP were normal. He underwent a pharmacologic nuclear stress test (2/6/2019) which was a negative, low risk study without evidence of ischemia or prior infarction and normal LV function (EF 79%). He has a history of diverticulosis, and has had multiple episodes of diverticulitis and the past. He had a screening colonoscopy in 3/2016 by Dr. Mina Zabala which showed advanced left-sided diverticulosis, and polyps in the cecum and ascending colon (pathology: tubular adenomas). Follow up recommended for five years.  In 2/2019, he presented with left sided abdominal pain, similar to prior episodes of diverticulitis, so he was prescribed Cipro and Flagyl for 10 days. He was seen again on 3/12/2019 for persistent left sided abdominal pain, but denied any fever, chills, nausea, vomiting, melena, or hematochezia. He had lost nine pounds over the last year by watching his diet, and did not report decreased appetite and fatigue. He underwent an abdominal CT scan (3/19/2019) which showed no acute findings to explain his symptoms, significant distal colonic diverticulosis without diverticulitis, and an infrarenal AAA showing mild increase in caliber from 2.5 to 2.9 cm since a CT scan in 2010. He also was restarted on omeprazole with significant improvement. He underwent a repeat screening colonoscopy on 12/15/2021 by Dr. Oumou Martínez showing seven 5- 7 mm sessile polyps in the cecum, ascending colon, transverse colon, and sigmoid (pathology: tubular adenomas). Repeat colonoscopy recommended for 3 years. He denies any abdominal pain, nausea, vomiting, melena, hematochezia, or change in bowel habits. He has a history of impaired fasting glucose glucose, with fasting blood sugar ranging from 102-106 from [id number] with a HbA1c of 5.8 at that time. Since 2014, however, his blood sugar and HbA1c have been in the normal range. He reports that this has improved since he is no longer drinking soda and has been watching his diet and weight. He has a regular eye exams with Dr. Mandy Laughlin. He denies any polyuria, polydipsia, nocturia, or blurry vision, and has no history of retinopathy, neuropathy, or nephropathy.       Past Medical History:   Diagnosis Date    Abnormal glucose     Calculus of kidney     Colon polyps     Diverticulitis 02/2019    Diverticulosis of colon 03/2016    Colonoscopy (3/2016) advanced left sided diverticulosis    Essential hypertension 3/25/2017    pt denies    Hyperlipidemia     Vertigo 07/2020     Past Surgical History:   Procedure Laterality Date    COLONOSCOPY N/A 12/15/2021    COLONOSCOPY with Polypectomies performed by Emily Nichole MD at [address] [facility], DIAGNOSTIC      2011, Dr. Edilia Bedoya, colon polyps, was going yearly    HX BLEPHAROPLASTY      HX CATARACT REMOVAL  2012    both eyes    HX OTHER SURGICAL      cn    [place] COLONOSCOPY W/BIOPSY SINGLE/MULTIPLE  3--16    Dr. Greg Arguelles     Current Outpatient Medications   Medication Sig    meloxicam (MOBIC) 7.5 mg tablet Take 1 Tablet by mouth daily. aspirin 81 mg chewable tablet Take 81 mg by mouth daily. OTHER Neuriva daily for memory    atorvastatin (LIPITOR) 20 mg tablet TAKE 1 TABLET DAILY    triprolidine/pseudoephedrine (ANTIHISTAMINE PO) Take  by mouth daily. psyllium (METAMUCIL) powd Take  by mouth. 3 capsules    cholecalciferol, vitamin D3, 50 mcg (2,000 unit) tab Take 2,000 Units by mouth daily. No current facility-administered medications for this visit. Allergies and Intolerances:   No Known Allergies     Family History: His father  had CABG and  from an MI. His mother  at the age of 80. He has no family history of colon or prostate cancer. Family History   Problem Relation Age of Onset    OSTEOARTHRITIS Mother     Elevated Lipids Mother     Heart Disease Father         Social History:   He  reports that he quit smoking about 33 years ago. His smoking use included cigarettes. He has never used smokeless tobacco. He smoked 1 ppd for 15 years, stopping in . He is  and has one daughter, Louann Jerez. He is a retired [employer]  and is self employed as an .      Social History     Substance and Sexual Activity   Alcohol Use Yes    Alcohol/week: 1.0 standard drink    Types: 1 Glasses of wine per week    Comment: glass of wine/week     Immunization History:  Immunization History   Administered Date(s) Administered    (RETIRED) Pneumococcal Vaccine (Unspecified Type) 2005    COVID-19, PFIZER Bivalent BOOSTER, (age 12y+), IM, 30 mcg/0.3 mL dose 09/16/2022    COVID-19, PFIZER PURPLE top, DILUTE for use, (age 15 y+), IM, 30mcg/0.3mL 02/02/2021, 02/23/2021, 10/14/2021, 04/19/2022    Influenza High Dose Vaccine PF 11/01/2013, 11/02/2016, 09/23/2017, 10/02/2018, 09/23/2022    Influenza Vaccine 10/31/2014, 10/11/2015    Influenza Vaccine Split 10/17/2011, 10/23/2012    Influenza Vaccine Whole 10/12/2010    Pneumococcal Conjugate (PCV-13) 01/05/2015    Pneumococcal Polysaccharide (PPSV-23) 01/01/2005, 04/13/2021    TD Vaccine 01/01/2009    Tdap 11/07/2017    Zoster 01/01/2009       Review of Systems:   As above included in HPI. Otherwise 11 point review of systems negative including constitutional, skin, HENT, eyes, respiratory, cardiovascular, gastrointestinal, genitourinary, musculoskeletal, endocrine, hematologic, allergy, and neurologic. Physical:   Visit Vitals  /70 (BP 1 Location: Left upper arm, BP Patient Position: Sitting)   Pulse (!) 56   Temp 97 °F (36.1 °C) (Temporal)   Resp 16   Ht 5' 6\" (1.676 m)   Wt 165 lb (74.8 kg)   SpO2 98%   BMI 26.63 kg/m²       Exam:   Patient appears in no apparent distress. Affect is appropriate. HEENT: PERRLA, anicteric, no JVD, adenopathy or thyromegaly. No carotid bruits or radiated murmur. Lungs: clear to auscultation, no wheezes, rhonchi, or rales. Heart: regular rate and rhythm. No murmur, rubs, gallops  Abdomen: soft, nontender, nondistended, normal bowel sounds, no hepatosplenomegaly or masses. Extremities: without edema. Right knee without erythema, warmth, or swelling. Mild tenderness on medial palpation. Negative straight leg raise bilaterally.         Review of Data:  Labs:  Hospital Outpatient Visit on 11/02/2022   Component Date Value Ref Range Status    WBC 11/02/2022 6.4  4.6 - 13.2 K/uL Final    RBC 11/02/2022 4.92  4.35 - 5.65 M/uL Final    HGB 11/02/2022 15.3  13.0 - 16.0 g/dL Final    HCT 11/02/2022 45.0  36.0 - 48.0 % Final    MCV 11/02/2022 91.5  78.0 - 100.0 FL Final    MCH 11/02/2022 31.1  24.0 - 34.0 PG Final    MCHC 11/02/2022 34.0  31.0 - 37.0 g/dL Final    RDW 11/02/2022 13.1  11.6 - 14.5 % Final    PLATELET 20/58/6994 901  135 - 420 K/uL Final    MPV 11/02/2022 9.2  9.2 - 11.8 FL Final    NRBC 11/02/2022 0.0  0  WBC Final    ABSOLUTE NRBC 11/02/2022 0.00  0.00 - 0.01 K/uL Final    NEUTROPHILS 11/02/2022 74 (A)  40 - 73 % Final    LYMPHOCYTES 11/02/2022 15 (A)  21 - 52 % Final    MONOCYTES 11/02/2022 8  3 - 10 % Final    EOSINOPHILS 11/02/2022 2  0 - 5 % Final    BASOPHILS 11/02/2022 1  0 - 2 % Final    IMMATURE GRANULOCYTES 11/02/2022 0  0.0 - 0.5 % Final    ABS. NEUTROPHILS 11/02/2022 4.8  1.8 - 8.0 K/UL Final    ABS. LYMPHOCYTES 11/02/2022 1.0  0.9 - 3.6 K/UL Final    ABS. MONOCYTES 11/02/2022 0.5  0.05 - 1.2 K/UL Final    ABS. EOSINOPHILS 11/02/2022 0.1  0.0 - 0.4 K/UL Final    ABS. BASOPHILS 11/02/2022 0.1  0.0 - 0.1 K/UL Final    ABS. IMM. GRANS.  11/02/2022 0.0  0.00 - 0.04 K/UL Final    DF 11/02/2022 AUTOMATED    Final    Hemoglobin A1c 11/02/2022 5.5  4.2 - 5.6 % Final    Est. average glucose 11/02/2022 111  mg/dL Final    LIPID PROFILE 11/02/2022        Final    Cholesterol, total 11/02/2022 139  <200 MG/DL Final    Triglyceride 11/02/2022 72  <150 MG/DL Final    HDL Cholesterol 11/02/2022 43  40 - 60 MG/DL Final    LDL, calculated 11/02/2022 81.6  0 - 100 MG/DL Final    VLDL, calculated 11/02/2022 14.4  MG/DL Final    CHOL/HDL Ratio 11/02/2022 3.2  0 - 5.0   Final    Sodium 11/02/2022 142  136 - 145 mmol/L Final    Potassium 11/02/2022 4.4  3.5 - 5.5 mmol/L Final    Chloride 11/02/2022 109  100 - 111 mmol/L Final    CO2 11/02/2022 28  21 - 32 mmol/L Final    Anion gap 11/02/2022 5  3.0 - 18 mmol/L Final    Glucose 11/02/2022 100 (A)  74 - 99 mg/dL Final    BUN 11/02/2022 13  7.0 - 18 MG/DL Final    Creatinine 11/02/2022 0.92  0.6 - 1.3 MG/DL Final    BUN/Creatinine ratio 11/02/2022 14  12 - 20   Final    eGFR 11/02/2022 >60  >60 ml/min/1.73m2 Final    Calcium 11/02/2022 9.2  8.5 - 10.1 MG/DL Final    Bilirubin, total 11/02/2022 1.1 (A)  0.2 - 1.0 MG/DL Final    ALT (SGPT) 11/02/2022 33  16 - 61 U/L Final    AST (SGOT) 11/02/2022 19  10 - 38 U/L Final    Alk. phosphatase 11/02/2022 71  45 - 117 U/L Final    Protein, total 11/02/2022 6.6  6.4 - 8.2 g/dL Final    Albumin 11/02/2022 3.7  3.4 - 5.0 g/dL Final    Globulin 11/02/2022 2.9  2.0 - 4.0 g/dL Final    A-G Ratio 11/02/2022 1.3  0.8 - 1.7   Final    Vitamin D 25-Hydroxy 11/02/2022 57.9  30 - 100 ng/mL Final    Color 11/02/2022 YELLOW    Final    Appearance 11/02/2022 CLEAR    Final    Specific gravity 11/02/2022 1.019  1.005 - 1.030   Final    pH (UA) 11/02/2022 7.0  5.0 - 8.0   Final    Protein 11/02/2022 Negative  NEG mg/dL Final    Glucose 11/02/2022 Negative  NEG mg/dL Final    Ketone 11/02/2022 Negative  NEG mg/dL Final    Bilirubin 11/02/2022 Negative  NEG   Final    Blood 11/02/2022 Negative  NEG   Final    Urobilinogen 11/02/2022 0.2  0.2 - 1.0 EU/dL Final    Nitrites 11/02/2022 Negative  NEG   Final    Leukocyte Esterase 11/02/2022 TRACE (A)  NEG   Final    WBC 11/02/2022 0 to 3  0 - 4 /hpf Final    RBC 11/02/2022 0 to 3  0 - 5 /hpf Final    Epithelial cells 11/02/2022 FEW  0 - 5 /lpf Final    Bacteria 11/02/2022 FEW (A)  NEG /hpf Final    Prostate Specific Ag 11/02/2022 1.2  0.0 - 4.0 ng/mL Final       XR Results (most recent):  Results from Hospital Encounter encounter on 11/02/22    XR KNEE RT MAX 2 VWS    Narrative  EXAM: XR KNEE RT MAX 2 VWS    INDICATION: right knee pain following fall    COMPARISON: None    TECHNIQUE:2 views    FINDINGS:  Bones/joints: No acute fracture or dislocation. There is mild to moderate medial  compartment joint space narrowing with subchondral sclerosis and marginal  osteophyte formation. The lateral patellofemoral compartments are preserved. No  suspicious bone lesions. Soft tissues: Unremarkable. Impression  1.   No acute findings. 2.  Mild medial compartment degenerative changes. Health Maintenance:  Screening:    Colorectal: colonoscopy (12/2021) tubular adenomas. Dr. Jayleen Maldonado. Due 12/2024   Depression: none   DM (HbA1c/FPG): FPG 94 (4/2022)/ HbA1c 5.5 (11/2022)   Hepatitis C: unknown   Falls: none   DEXA: N/A   PSA/SHANTELL: PSA 1.2 (11/2022)   Glaucoma: regular eye exams with Dr. 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 9) E66.3       Plan:  Acute right knee pain. Sustained a mechanical fall in 10/2021 resulting in fall on his right knee. Noted acute pain but gradually improved. However, developed recurrent right knee pain 2 months ago after performing a forward lunge while exercising. Noting increase in pain with ambulation. Did apply ice and began taking ibuprofen 400 mg twice daily without improvement. Changed to Tylenol ES but not noting significant benefit. Right knee x-ray obtained (11/2/2022) showing mild-moderate medial compartment joint space narrowing with subchondral sclerosis and marginal osteophyte formation consistent with osteoarthritis. Will begin meloxicam 7.5 mg daily and agreeable to referral to Dr. Jg Billingsley for further evaluation and management. Chronic issues:  1. Transient speech disturbance. Reported an episode in 12/2020 of difficulty speaking for approximately two minutes. Aware of deficit but could not formulate words.  Denied any visual changes or focal deficits associated with episode and denied any history of palpitations. Underwent brain MRI and carotid duplex ultrasound (5/2021) which were unremarkable. On aspirin and statin. Reports no recurrence of symptoms today. Will continue to monitor. 2. Hypertension. Remains well controlled without medication. Renal function remains normal with creatinine 0.92/ eGFR >60. Continue to follow. 3. Hyperlipidemia. On moderate intensity dose atorvastatin 20 mg daily with LDL 81 and HDL 47, indicative of good control. Emphasized importance of continued lifestyle modifications, including heart healthy diet and regular exercise. 4. Impaired fasting glucose. Fasting glucose and HbA1c mildly elevated in past. However, improved with weight loss. Remains normal today with HbA1c at 5.5. Discussed importance of continued lifestyle modifications, including heart healthy low carbohydrate diet and regular exercise. Will continue to follow. 5. History of vertigo. Patient reported worsening tinnintus and vertigo in 7/2020 suggestive of vestibular neuritis. Treated with meclizine with improvement. Reports recurrent episode in 3/2021 and responded to meclizine over several days. Underwent contrast brain MRI with negative IAC noted. Advised to continue meclizine as needed. 6. Fall, sequela. Patient sustained a mechanical fall in 10/2021 resulting in right knee and left elbow injury with pain. Did not seek medical attention and reported gradual improvement. However, with worsening right knee pain today as discussed. Fall precautions stressed. 7. Multiple adenomatous polyps. Underwent screening colonoscopy in 12/2021 with removal of 7 sessile polyps with pathology showing all to be tubular adenomas. Repeat colonoscopy recommended for 3 years (due 12/2024). 8. History of diverticulosis. Known extensive left sided diverticulosis on prior colonoscopy. Has not had recurrent episode of diverticulitis in many years. Continue high fiber diet. 9. Overweight. Weight decreased 4 pounds since last visit. Patient continuing to exercise at the gym 3 days/week. Emphasized importance of continued lifestyle modifications, including heart healthy diet and regular exercise. Will continue to monitor. 10. Health maintenance. Completed the [facility] COVID-19 vaccine series and received two Pfizer booster doses and the updated bivalent booster dose. Already received the influenza vaccine. Unable to receive Shingrix vaccine due to cost. Other immunizations up to date. Completed colonoscopy and repeat due in 3 years. Prostate cancer screening up to date. Patient wishing to continue following PSA. Vitamin D level has been normal on maintenance dose supplement. Continue regular eye exams with Dr. Luna Young. Medicare wellness visit up-to-date. Patient understands recommendations and agrees with plan. Follow-up as previously scheduled. Future orders:    ICD-10-CM ICD-9-CM    1. Right knee pain, unspecified chronicity  M25.561 719.46 XR KNEE RT MAX 2 VWS      REFERRAL TO ORTHOPEDICS      2. Fall, subsequent encounter  W19. XXXD V58.89 XR KNEE RT MAX 2 VWS     E888.9 REFERRAL TO ORTHOPEDICS      3. Primary osteoarthritis of right knee  M17.11 715.16 REFERRAL TO ORTHOPEDICS      4. Essential hypertension  I10 401.9 CBC WITH AUTOMATED DIFF      METABOLIC PANEL, COMPREHENSIVE      5. Hyperlipidemia, unspecified hyperlipidemia type  E78.5 272.4 LIPID PANEL      METABOLIC PANEL, COMPREHENSIVE      6. Impaired fasting glucose  R73.01 790.21 HEMOGLOBIN A1C WITH EAG      METABOLIC PANEL, COMPREHENSIVE      7. Vitamin D deficiency, unspecified   E55.9 268.9 VITAMIN D, 25 HYDROXY      8. Prostate cancer screening  Z12.5 V76.44 PSA SCREENING (SCREENING)      9. Overweight (BMI 25.0-29. 9)  E66.3 278.02

## 2022-11-21 RX ORDER — ATORVASTATIN CALCIUM 20 MG/1
TABLET, FILM COATED ORAL
Qty: 90 TABLET | Refills: 3 | Status: SHIPPED | OUTPATIENT
Start: 2022-11-21

## 2022-11-28 ENCOUNTER — OFFICE VISIT (OUTPATIENT)
Dept: ORTHOPEDIC SURGERY | Age: 82
End: 2022-11-28
Payer: MEDICARE

## 2022-11-28 VITALS
HEIGHT: 66 IN | HEART RATE: 70 BPM | WEIGHT: 169.4 LBS | OXYGEN SATURATION: 99 % | RESPIRATION RATE: 16 BRPM | BODY MASS INDEX: 27.23 KG/M2

## 2022-11-28 DIAGNOSIS — M17.11 PRIMARY OSTEOARTHRITIS OF RIGHT KNEE: Primary | ICD-10-CM

## 2022-11-28 DIAGNOSIS — E66.3 OVERWEIGHT (BMI 25.0-29.9): ICD-10-CM

## 2022-11-28 RX ORDER — TRIAMCINOLONE ACETONIDE 40 MG/ML
80 INJECTION, SUSPENSION INTRA-ARTICULAR; INTRAMUSCULAR ONCE
Status: COMPLETED | OUTPATIENT
Start: 2022-11-28 | End: 2022-11-28

## 2022-11-28 RX ADMIN — TRIAMCINOLONE ACETONIDE 80 MG: 40 INJECTION, SUSPENSION INTRA-ARTICULAR; INTRAMUSCULAR at 10:19

## 2022-11-28 NOTE — PROGRESS NOTES
Patient: Lily Brown                MRN: [id number]       SSN: [id number]  YOB: 1940        AGE: 80 y.o. SEX: male  Body mass index is 27.34 kg/m². PCP: Claritza Day MD  11/28/22  Occupation:   Retired    Chief Complaint: Knee Pain (Right knee pain)        ICD-10-CM ICD-9-CM    1. Primary osteoarthritis of right knee  M17.11 715.16 AMB POC XRAY, KNEE; COMPLETE, 4+ VIEW      2. Overweight (BMI 25.0-29. 9)  E66.3 278.02           HPI: Lily Brown is a 80 y.o. male patient with Knee Pain (Right knee pain)  That is been present for several years however over the last year has been particularly bad starting with a fall onto the right knee in October 2021. It was exacerbated again 2 months after that when doing a forward lunge. Since that time he has had pain that fluctuates between a 5-10 out of 10 pain. It is aching and mostly on the medial aspect of the knee he will occasionally get a radiating pain down into the foot from his knee. Tobacco Use: Medium Risk    Smoking Tobacco Use: Former    Smokeless Tobacco Use: Never    Passive Exposure: Not on file         PHYSICAL EXAMINATION:  Visit Vitals  Pulse 70   Resp 16   Ht 5' 6\" (1.676 m)   Wt 169 lb 6.4 oz (76.8 kg)   SpO2 99%   BMI 27.34 kg/m²     Body mass index is 27.34 kg/m². GENERAL: Alert and oriented x3, in no acute distress. HEENT: Normocephalic, atraumatic. MSK: Right Knee Exam     Tenderness   The patient is experiencing tenderness in the medial joint line. Range of Motion   Extension:  5   Flexion:  120     Tests   Varus: negative Valgus: negative    Other   Erythema: absent  Sensation: normal  Pulse: present  Swelling: mild    Comments:  Able toGenu varum of the right knee without thrust during gait.   Able to correct to Neutral.           IMAGING:  Imaging read by myself and interpreted as follows:  4 view x-ray of the right knee including AP, lateral, sunrise and notch views demonstrates complete loss of joint space on the tunnel view in the medial compartment with subchondral sclerosis subchondral cyst formation and osteophytosis. There are small osteophytes noted in the patellofemoral compartment without significant joint space narrowing. The lateral compartment appears on affected. ASSESSMENT & PLAN  Diagnosis: 80 y.o. male with right knee osteoarthritis. The pain is confined to the medial compartment and there is no retropatellar crepitus. I discussed possible treatments including unicompartmental arthroplasty versus total knee arthroplasty. He would like time to think about about think he would be an excellent candidate for unicompartmental knee arthroplasty with [facility]. She would like some time to think about it, but if he decides to go forward with a medial unicompartmental arthroplasty he will call and I will put the order in and start 3 planning. Because of today's cortisone injection he will not be able to go forward with the surgery until after February 28, 2023. I discussed with the patient the natural history of arthritis and its progressive nature. We discussed conservative treatment options to include ice, rest, compression, elevation, physical therapy, activity modification, weight loss, dieting, Tylenol, NSAIDs, topicals, prescription medication, as well as touched on more invasive treatments including injection therapy and joint replacement. , The patient would like to go forward with injection therapy at this time. , and After explaining potential risk of infection, skin discoloration, hyperglycemia or flushing, I obtained written consent for a right knee corticosteroid injection, the patient's right knee inferolateral portal site was prepped with alcohol and ethyl chloride freeze spray. 80 mg of Kenalog along with 2 mL of 2% lidocaine was injected and the patient tolerated it well.     Past Medical History:   Diagnosis Date    Abnormal glucose     Calculus of kidney     Colon polyps     Diverticulitis 2019    Diverticulosis of colon 2016    Colonoscopy (3/2016) advanced left sided diverticulosis    Essential hypertension 3/25/2017    pt denies    Hyperlipidemia     Vertigo 2020       Family History   Problem Relation Age of Onset    OSTEOARTHRITIS Mother     Elevated Lipids Mother     Heart Disease Father        Current Outpatient Medications   Medication Sig Dispense Refill    atorvastatin (LIPITOR) 20 mg tablet TAKE 1 TABLET DAILY 90 Tablet 3    meloxicam (MOBIC) 7.5 mg tablet Take 1 Tablet by mouth daily. 30 Tablet 1    aspirin 81 mg chewable tablet Take 81 mg by mouth daily. OTHER Neuriva daily for memory      triprolidine/pseudoephedrine (ANTIHISTAMINE PO) Take  by mouth daily. psyllium (METAMUCIL) powd Take  by mouth. 3 capsules      cholecalciferol, vitamin D3, 50 mcg (2,000 unit) tab Take 2,000 Units by mouth daily. No Known Allergies    Past Surgical History:   Procedure Laterality Date    COLONOSCOPY N/A 12/15/2021    COLONOSCOPY with Polypectomies performed by Naz Gallegos MD at [facility], COLON, DIAGNOSTIC      2011, Dr. Libby Kruse, colon polyps, was going yearly    HX BLEPHAROPLASTY      HX CATARACT REMOVAL  2012    both eyes    HX OTHER SURGICAL      cn    IN COLONOSCOPY W/BIOPSY SINGLE/MULTIPLE  3-4-16    Dr. Leta Jin History     Socioeconomic History    Marital status:      Spouse name: Not on file    Number of children: Not on file    Years of education: Not on file    Highest education level: Not on file   Occupational History    Not on file   Tobacco Use    Smoking status: Former     Types: Cigarettes     Quit date: 1988     Years since quittin.0    Smokeless tobacco: Never   Substance and Sexual Activity    Alcohol use:  Yes     Alcohol/week: 1.0 standard drink     Types: 1 Glasses of wine per week     Comment: glass of wine/week    Drug use: No    Sexual activity: Yes     Partners: Female   Other Topics Concern    Not on file   Social History Narrative    Not on file     Social Determinants of Health     Financial Resource Strain: Not on file   Food Insecurity: Not on file   Transportation Needs: Not on file   Physical Activity: Not on file   Stress: Not on file   Social Connections: Not on file   Intimate Partner Violence: Not on file   Housing Stability: Not on file       REVIEW OF SYSTEMS:      No changes from previous review of systems unless noted. Prescription medication management discussed with patient. Electronically signed by: Sherren Harris, DO    Note: This note was completed using voice recognition software.   Any typographical/name errors or mistakes are unintentional.

## 2022-12-05 DIAGNOSIS — M17.11 PRIMARY LOCALIZED OSTEOARTHRITIS OF RIGHT KNEE: Primary | ICD-10-CM

## 2022-12-05 NOTE — PROGRESS NOTES
Patient called and said he would like to go forward with a medial unicompartmental right knee replacement

## 2022-12-20 ENCOUNTER — HOSPITAL ENCOUNTER (OUTPATIENT)
Dept: CT IMAGING | Age: 82
Discharge: HOME | End: 2022-12-20
Attending: ORTHOPAEDIC SURGERY
Payer: MEDICARE

## 2022-12-20 DIAGNOSIS — M17.11 PRIMARY LOCALIZED OSTEOARTHRITIS OF RIGHT KNEE: ICD-10-CM

## 2022-12-20 PROCEDURE — 73700 CT LOWER EXTREMITY W/O DYE: CPT

## 2023-02-02 ENCOUNTER — TELEPHONE (OUTPATIENT)
Dept: INTERNAL MEDICINE CLINIC | Age: 83
End: 2023-02-02

## 2023-02-02 ENCOUNTER — PREP FOR PROCEDURE (OUTPATIENT)
Age: 83
End: 2023-02-02

## 2023-02-02 DIAGNOSIS — Z01.818 PREOPERATIVE TESTING: ICD-10-CM

## 2023-02-02 DIAGNOSIS — Z01.811 PRE-OP CHEST EXAM: ICD-10-CM

## 2023-02-02 DIAGNOSIS — M17.11 PRIMARY OSTEOARTHRITIS OF RIGHT KNEE: Primary | ICD-10-CM

## 2023-02-02 DIAGNOSIS — Z01.810 PREOP CARDIOVASCULAR EXAM: ICD-10-CM

## 2023-02-02 NOTE — TELEPHONE ENCOUNTER
Pt is sched for partial  knee  replacementt surgery on 03/08 -  is there a day he can be seen for a pre op ?   Judy Anglin from Dr Eli Medina  Asking for a call back with a date

## 2023-02-04 DIAGNOSIS — I10 ESSENTIAL HYPERTENSION: Primary | ICD-10-CM

## 2023-02-04 DIAGNOSIS — E78.5 HYPERLIPIDEMIA, UNSPECIFIED HYPERLIPIDEMIA TYPE: ICD-10-CM

## 2023-02-04 DIAGNOSIS — E78.5 HYPERLIPIDEMIA, UNSPECIFIED HYPERLIPIDEMIA TYPE: Primary | ICD-10-CM

## 2023-02-04 DIAGNOSIS — R73.01 IMPAIRED FASTING GLUCOSE: ICD-10-CM

## 2023-02-05 DIAGNOSIS — R73.01 IMPAIRED FASTING GLUCOSE: Primary | ICD-10-CM

## 2023-02-06 DIAGNOSIS — E55.9 VITAMIN D DEFICIENCY, UNSPECIFIED: Primary | ICD-10-CM

## 2023-02-07 DIAGNOSIS — Z12.5 PROSTATE CANCER SCREENING: Primary | ICD-10-CM

## 2023-02-13 NOTE — PROGRESS NOTES
HPI:   Thomes Conception is a 80y.o. year old male who presents today for preoperative evaluation. He has a history of hypertension, hyperlipidemia, impaired fasting glucose, vertigo, colonic adenomas, and diverticulosis. He has completed the Ho Peter COVID-19 vaccine series and received two Pfizer booster doses and an updated bivalent booster dose. He reports that he is doing reasonably well. In 10/2021, he sustained a fall after tripping while walking on concrete. He reported injury to his right knee and left elbow but did not seek medical attention, and noted gradual improvement with conservative measures. On 11/2/2022, he reported acute onset of right knee pain for approximately 8 weeks after performing a forward lunge while exercising. He stated that he initially applied ice and began taking ibuprofen 400 mg twice daily and/or Tylenol ES without improvement. He reported difficulty ambulating and sleeping at night due to pain. He had a right knee x-ray (11/2/2022) showing mild medial compartment degenerative changes with joint space narrowing, subchondral sclerosis, and marginal osteophyte formation. He was started on meloxicam and referred to Dr. Micheal Perales on 11/28/2022. He received a cortisone injection and advised if no improvement, recommendation would be to proceed with unicompartmental knee arthroplasty given isolated discomfort in the medial compartment. He reported persistent pain and underwent a CT scan right knee (12/20/2022) which showed moderately advanced right knee osteoarthrosis with tricompartmental osteophytosis and medial compartment subchondral sclerosis/subchondral cysts with vacuum phenomenon; incidental small right gluteus jorge intramuscular lipoma. He is now scheduled for a partial right knee replacement on 3/8/2023 with Dr. Micheal Perales. He reports that he is continuing to exercise regularly and attempting to strengthen his right leg muscles.   He denies any change in exercise tolerance. He is otherwise without new complaints and overall feeling well. Summary of prior hospitalizations and medical history:  He reported an episode in 12/2020 while a passenger in a car where he was unable to speak for 1-2 minutes. He states that he was aware that the episode was occurring, but he could not formulate words or a sentence. He stated that following the episode, he returned to baseline. He denied any visual changes, focal deficits, or palpitations. He underwent a brain MRI and carotid duplex ultrasound (5/2021) which were unremarkable. He remains on aspirin and statin, and reports no recurrence of symptoms. On 7/3/2020, he contacted on call Dr. Gabbie Amaya and complained of nasal congestion with intermittent dizziness. He was instructed to use Sudafed, and was prescribed amoxicillin. However, he became concerned that his dizziness became worse, and presented to the [facility] ED on 7/5/2020. AN EKG was performed, showing sinus rhythm at 63 bpm, normal intervals, and without ischemic changes. Labs showed Hb 15.8/ Hct 45.8, WBC 5.7, creatinine 0.94/ eGFR >60, K 3.8, troponin negative, and NT pro-. He was discharged. He described his dizziness as a \"spinning sensation\", and admitted to worsening tinnitus and nausea. He was prescribed meclizine with improvement. He has a history of hypertension, not requiring treatment with medication. He states that he monitors his blood pressure only occasionally at home, and reports most readings 120-130/ 70-80. He exercises regularly, going to the gym three times per week doing cardio for 30 minutes and light weights. He denies any chest pain, shortness of breath, lightheadedness, palpitations, edema, PND, or orthopnea. He also has a history of hyperlipidemia, treated with moderate intensity does atorvastatin. He was evaluated by BENJY Joshua on 2/1/2019 for complaint of episodes of indigestion accompanied by dyspnea and mild dizziness.  He denied any typical chest pain, palpitations, edema, orthopnea, or signs of thrombosis. He also denied an association with exertion, continuing to exercise on treadmill for 1.5 mile without symptoms. Evaluation included EKG showing sinus bradycardia at 54 bpm and no ischemic changes. CBC and CMP were normal. He underwent a pharmacologic nuclear stress test (2/6/2019) which was a negative, low risk study without evidence of ischemia or prior infarction and normal LV function (EF 79%). He has a history of diverticulosis, and has had multiple episodes of diverticulitis and the past. He had a screening colonoscopy in 3/2016 by Dr. Vonnie Moran which showed advanced left-sided diverticulosis, and polyps in the cecum and ascending colon (pathology: tubular adenomas). Follow up recommended for five years. In 2/2019, he presented with left sided abdominal pain, similar to prior episodes of diverticulitis, so he was prescribed Cipro and Flagyl for 10 days. He was seen again on 3/12/2019 for persistent left sided abdominal pain, but denied any fever, chills, nausea, vomiting, melena, or hematochezia. He had lost nine pounds over the last year by watching his diet, and did not report decreased appetite and fatigue. He underwent an abdominal CT scan (3/19/2019) which showed no acute findings to explain his symptoms, significant distal colonic diverticulosis without diverticulitis, and an infrarenal AAA showing mild increase in caliber from 2.5 to 2.9 cm since a CT scan in 2010. He also was restarted on omeprazole with significant improvement. He underwent a repeat screening colonoscopy on 12/15/2021 by Dr. Rubi Mueller showing seven 5- 7 mm sessile polyps in the cecum, ascending colon, transverse colon, and sigmoid (pathology: tubular adenomas). Repeat colonoscopy recommended for 3 years. He denies any abdominal pain, nausea, vomiting, melena, hematochezia, or change in bowel habits.      He has a history of impaired fasting glucose glucose, with fasting blood sugar ranging from 102-106 from [id number] with a HbA1c of 5.8 at that time. Since , however, his blood sugar and HbA1c have been in the normal range. He reports that this has improved since he is no longer drinking soda and has been watching his diet and weight. He has a regular eye exams with Dr. Amebrly Natarajan. He denies any polyuria, polydipsia, nocturia, or blurry vision, and has no history of retinopathy, neuropathy, or nephropathy. Past Medical History:   Diagnosis Date    Abnormal glucose     Calculus of kidney     Colon polyps     Diverticulitis 2019    Diverticulosis of colon 2016    Colonoscopy (3/2016) advanced left sided diverticulosis    Essential hypertension 3/25/2017    pt denies    Hyperlipidemia     Vertigo 2020     Past Surgical History:   Procedure Laterality Date    BLEPHAROPLASTY      CATARACT REMOVAL  2012    both eyes    COLONOSCOPY      2011, Dr. Rina Ibarra, colon polyps, was going yearly    COLONOSCOPY N/A 12/15/2021    COLONOSCOPY with Polypectomies performed by Nicklas Denver, MD at [address] [facility]  3-4-16    Dr. Sally Menjivar      cn     No current outpatient medications on file. No current facility-administered medications for this visit. Allergies and Intolerances:   Not on File    Family History: His father  had CABG and  from an MI. His mother  at the age of 80. He has no family history of colon or prostate cancer. Family History   Problem Relation Age of Onset    Elevated Lipids Mother     Osteoarthritis Mother     Heart Disease Father      Social History:   He  reports that he quit smoking about 34 years ago. His smoking use included cigarettes. He has never used smokeless tobacco. He smoked 1 ppd for 15 years, stopping in . He is  and has one daughter, Beronica Webster. He is a retired [employer]  and is self employed as an . Social History     Substance and Sexual Activity   Alcohol Use Yes    Alcohol/week: 1.0 standard drink     Immunization History:  Immunization History   Administered Date(s) Administered    COVID-19, PFIZER PURPLE top, DILUTE for use, (age 15 y+), 30mcg/0.3mL 02/02/2021, 02/23/2021, 10/14/2021    Influenza Trivalent 10/31/2014    Influenza Virus Vaccine 10/12/2010, 10/17/2011, 10/23/2012, 10/11/2015    Influenza, High Dose (Fluzone 65 yrs and older) 11/01/2013, 11/02/2016, 09/23/2017, 10/02/2018    Pneumococcal Conjugate 13-valent (Paiaguz27) 01/05/2015    Pneumococcal Polysaccharide (Jflasvven00) 01/01/2005, 04/13/2021    Pneumococcal Vaccine 01/01/2005    Td vaccine (adult) 01/01/2009    Tdap (Boostrix, Adacel) 11/07/2017    Zoster Live (Zostavax) 01/01/2009       Review of Systems:   As above included in HPI. Otherwise 11 point review of systems negative including constitutional, skin, HENT, eyes, respiratory, cardiovascular, gastrointestinal, genitourinary, musculoskeletal, endocrine, hematologic, allergy, and neurologic. Physical:   Vitals:   Vitals:    02/14/23 1306   BP: 115/72   Pulse: 59   Resp: 16   Temp: 97.7 °F (36.5 °C)   SpO2: 98%       Exam:   Patient appears in no apparent distress. Affect is appropriate. HEENT: PERRLA, anicteric, no JVD, adenopathy or thyromegaly. Lungs: clear to auscultation, no wheezes, rhonchi, or rales. Heart: regular rate and rhythm. No murmur, rubs, gallops  Abdomen: soft, nontender, nondistended, normal bowel sounds, no hepatosplenomegaly or masses. Extremities: without edema.           Review of Data:  Labs:    Lab Results   Component Value Date    WBC 6.4 11/02/2022    HGB 15.3 11/02/2022    HCT 45.0 11/02/2022    MCV 91.5 11/02/2022     11/02/2022     Lab Results   Component Value Date     11/02/2022    K 4.4 11/02/2022     11/02/2022    CO2 28 11/02/2022    BUN 13 11/02/2022    CREATININE 0.92 11/02/2022    GLUCOSE 100 (H) 11/02/2022 CALCIUM 9.2 11/02/2022    PROT 6.6 11/02/2022    LABALBU 3.7 11/02/2022    BILITOT 1.1 (H) 11/02/2022    ALKPHOS 71 11/02/2022    AST 19 11/02/2022    ALT 33 11/02/2022    LABGLOM >60 05/04/2021    GFRAA >60 04/14/2022    AGRATIO 1.3 11/02/2022    GLOB 2.9 11/02/2022       Hemoglobin A1C   Date Value Ref Range Status   11/02/2022 5.5 4.2 - 5.6 % Final     Comment:     (NOTE)  HbA1C Interpretive Ranges  <5.7              Normal  5.7 - 6.4         Consider Prediabetes  >6.5              Consider Diabetes       Lab Results   Component Value Date/Time    VITD25 57.9 11/02/2022 08:50 AM      Lab Results   Component Value Date    PSA 1.2 11/02/2022     Xray Result (most recent):  XR KNEE RIGHT (1-2 VIEWS) 11/02/2022    Narrative  EXAM: XR KNEE RT MAX 2 VWS    INDICATION: right knee pain following fall    COMPARISON: None    TECHNIQUE:2 views    FINDINGS:  Bones/joints: No acute fracture or dislocation. There is mild to moderate medial  compartment joint space narrowing with subchondral sclerosis and marginal  osteophyte formation. The lateral patellofemoral compartments are preserved. No  suspicious bone lesions. Soft tissues: Unremarkable. Impression  1. No acute findings. 2.  Mild medial compartment degenerative changes. CT Result (most recent):  CT KNEE RIGHT WO CONTRAST 12/20/2022    Narrative  Exam: CT right knee without contrast ([facility] protocol). Indications: Primary osteoarthritis of right knee, preoperative evaluation    Comparison: Right knee radiograph 11/2/2022. FINDINGS:    Diffuse osteopenia. Right hip: Intact alignment. No fracture or AVN. No joint effusion. Mild  marginal osteophytes and degenerative changes in bilateral hips. No destructive  lesion. Intramuscular lipoma 3 x 0.9 cm in the superior right gluteus jorge. No free fluid in the pelvis. Incidentally noted sigmoid diverticulosis. Mild  groin atherosclerosis. Right knee: Moderately advanced tricompartmental osteophytosis. Medial  compartment subchondral sclerosis and subchondral cysts with vacuum phenomenon. No knee joint effusion. Intact quadriceps and patellar tendons. Mild popliteal  atherosclerosis. Right ankle: No fracture. Normal alignment. Joint spaces are preserved. No joint  effusion. Mild atherosclerosis. Impression  Moderately advanced right knee osteoarthrosis as discussed. Small right gluteus jorge intramuscular lipoma. EKG (2/14/2023) sinus bradycardia at 53 bpm, normal intervals. No change when compared to prior EKG from 7/5/2020. Health Maintenance:  Screening:               Colorectal: colonoscopy (12/2021) tubular adenomas. Dr. Leonor Rubinstein. Due 12/2024              Depression: none              DM (HbA1c/FPG): FPG 94 (4/2022)/ HbA1c 5.5 (11/2022)              Hepatitis C: unknown              Falls: none              DEXA: N/A              PSA/PUNEET: PSA 1.2 (11/2022)              Glaucoma: regular eye exams with Dr. Colt Roach (last 4/2022)              Smoking: distant past              Vitamin D: 57.9 (11/2022)              Medicare Wellness: 4/21/2022        Impression:  Patient Active Problem List   Diagnosis    Vestibular neuronitis    Overweight (BMI 25.0-29. 9)    Allergic rhinitis    History of vitamin D deficiency    Transient speech disturbance    Hyperlipidemia    Diverticulosis of large intestine without hemorrhage    Colon polyps    Impaired fasting glucose    Essential hypertension    History of diverticulitis       Plan:  Preoperative risk stratification:  Patient is scheduled to undergo right medial unicompartmental arthroplasty on 3/8/2023. He currently has no symptoms suggestive of angina or decompensated heart failure. He maintains an active lifestyle and has no functional limitations. Preoperative EKG (2/14/2023) was reviewed and unchanged from prior study. He is currently clinically stable and without absolute medical contraindication for surgery.   He is low risk for a avery-procedural cardiac event. Surgery may proceed as planned at the discretion of Dr. Melanie Henriquez. Right knee osteoarthritis with pain. Onset of right knee pain following a mechanical fall in 10/2021. Gradually improved but developed recurrent pain in 9/2022 after performing a forward lunge while exercising. Did not improve with conservative measures including application of ice and taking ibuprofen or Tylenol ES. Right knee x-ray obtained (11/2/2022) showed mild-moderate medial compartment joint space narrowing with subchondral sclerosis and marginal osteophyte formation consistent with osteoarthritis. Right knee CT (12/20/2022) showed moderately advanced right knee osteoarthrosis with tricompartmental osteophytosis and medial compartment subchondral sclerosis/subchondral cysts with vacuum phenomenon. Did not respond to meloxicam or a cortisone injection, and now scheduled for a medial unicompartmental arthroplasty on 3/8/2023 with Dr. Melanie Henriquez. Chronic issues:  1. Transient speech disturbance. Reported an episode in 12/2020 of difficulty speaking for approximately two minutes. Aware of deficit but could not formulate words. Denied any visual changes or focal deficits associated with episode and denied any history of palpitations. Underwent brain MRI and carotid duplex ultrasound (5/2021) which were unremarkable. On aspirin and statin. Reports no recurrence of symptoms today. Will continue to monitor. 2. Hypertension. Remains well controlled without medication. Renal function has been normal with creatinine 0.92/ eGFR >60. Continue to follow. 3. Hyperlipidemia. On moderate intensity dose atorvastatin 20 mg daily with LDL 81 and HDL 47 (11/2022), indicative of good control. Emphasized importance of continued lifestyle modifications, including heart healthy diet and regular exercise. 4. Impaired fasting glucose. Fasting glucose and HbA1c mildly elevated in past. However, improved with weight loss. Has been normal with HbA1c at 5.5 (11/2022). Discussed importance of continued lifestyle modifications, including heart healthy low carbohydrate diet and regular exercise. Will continue to follow. 5. History of vertigo. Patient reported worsening tinnintus and vertigo in 7/2020 suggestive of vestibular neuritis. Treated with meclizine with improvement. Reports recurrent episode in 3/2021 and responded to meclizine over several days. Underwent contrast brain MRI with negative IAC noted. Advised to continue meclizine as needed. 6. Fall, sequela. Patient sustained a mechanical fall in 10/2021 resulting in right knee and left elbow injury with pain. Did not seek medical attention and reported gradual improvement. However, developed worsening right knee pain as discussed. Fall precautions stressed. 7. Multiple adenomatous polyps. Underwent screening colonoscopy in 12/2021 with removal of 7 sessile polyps with pathology showing all to be tubular adenomas. Repeat colonoscopy recommended for 3 years (due 12/2024). 8. History of diverticulosis. Known extensive left sided diverticulosis on prior colonoscopy. Has not had recurrent episode of diverticulitis in many years. Continue high fiber diet. 9. Overweight. Weight decreased 7 pounds since last visit. Patient continuing to exercise at the gym 3 days/week. Emphasized importance of continued lifestyle modifications, including heart healthy diet and regular exercise. Will continue to monitor. 10. Health maintenance. Completed the [facility] Peter COVID-19 vaccine series and received two Pfizer booster doses and the updated bivalent booster dose. Already received the influenza vaccine. Has not received the Shingrix vaccine due to cost, but advised of new insurance coverage and urged to proceed. Other immunizations up to date. Colonoscopy up-to-date with repeat due in 2024. Prostate cancer screening up to date. Patient wishing to continue following PSA. Vitamin D level has been normal on maintenance dose supplement. Continue regular eye exams with Dr. Rosa Beck. Medicare wellness visit up-to-date. Patient understands recommendations and agrees with plan. Follow-up as previously scheduled. Time spent in preparing for the visit, including review of history, tests done prior to arrival, additional time reviewing clinical data, imaging, outside records and test results:  10  minutes. Time spent in counseling with patient and/or family members regarding care plan: 20 minutes. Time spent in ordering tests, treatments, and referring patient for further care: 15 minutes.

## 2023-02-14 ENCOUNTER — OFFICE VISIT (OUTPATIENT)
Age: 83
End: 2023-02-14
Payer: MEDICARE

## 2023-02-14 ENCOUNTER — HOSPITAL ENCOUNTER (OUTPATIENT)
Facility: HOSPITAL | Age: 83
Discharge: HOME | End: 2023-02-17

## 2023-02-14 VITALS
DIASTOLIC BLOOD PRESSURE: 72 MMHG | HEIGHT: 66 IN | SYSTOLIC BLOOD PRESSURE: 115 MMHG | HEART RATE: 59 BPM | RESPIRATION RATE: 16 BRPM | BODY MASS INDEX: 26.07 KG/M2 | OXYGEN SATURATION: 98 % | WEIGHT: 162.2 LBS | TEMPERATURE: 97.7 F

## 2023-02-14 DIAGNOSIS — E78.5 HYPERLIPIDEMIA, UNSPECIFIED HYPERLIPIDEMIA TYPE: ICD-10-CM

## 2023-02-14 DIAGNOSIS — Z01.818 PREOPERATIVE EVALUATION TO RULE OUT SURGICAL CONTRAINDICATION: ICD-10-CM

## 2023-02-14 DIAGNOSIS — E66.3 OVERWEIGHT (BMI 25.0-29.9): ICD-10-CM

## 2023-02-14 DIAGNOSIS — R73.01 IMPAIRED FASTING GLUCOSE: ICD-10-CM

## 2023-02-14 DIAGNOSIS — I10 ESSENTIAL HYPERTENSION: ICD-10-CM

## 2023-02-14 DIAGNOSIS — Z01.818 PREOPERATIVE EVALUATION TO RULE OUT SURGICAL CONTRAINDICATION: Primary | ICD-10-CM

## 2023-02-14 PROCEDURE — 3078F DIAST BP <80 MM HG: CPT | Performed by: INTERNAL MEDICINE

## 2023-02-14 PROCEDURE — 3074F SYST BP LT 130 MM HG: CPT | Performed by: INTERNAL MEDICINE

## 2023-02-14 PROCEDURE — G8417 CALC BMI ABV UP PARAM F/U: HCPCS | Performed by: INTERNAL MEDICINE

## 2023-02-14 PROCEDURE — G8484 FLU IMMUNIZE NO ADMIN: HCPCS | Performed by: INTERNAL MEDICINE

## 2023-02-14 PROCEDURE — 99215 OFFICE O/P EST HI 40 MIN: CPT | Performed by: INTERNAL MEDICINE

## 2023-02-14 PROCEDURE — 1036F TOBACCO NON-USER: CPT | Performed by: INTERNAL MEDICINE

## 2023-02-14 PROCEDURE — 1123F ACP DISCUSS/DSCN MKR DOCD: CPT | Performed by: INTERNAL MEDICINE

## 2023-02-14 PROCEDURE — G8427 DOCREV CUR MEDS BY ELIG CLIN: HCPCS | Performed by: INTERNAL MEDICINE

## 2023-02-14 RX ORDER — ALUMINUM ZIRCONIUM OCTACHLOROHYDREX GLY 16 G/100G
GEL TOPICAL
COMMUNITY

## 2023-02-14 RX ORDER — ATORVASTATIN CALCIUM 20 MG/1
TABLET, FILM COATED ORAL
COMMUNITY
Start: 2022-11-21

## 2023-02-14 RX ORDER — ASPIRIN 81 MG/1
81 TABLET, CHEWABLE ORAL DAILY
COMMUNITY

## 2023-02-14 ASSESSMENT — PATIENT HEALTH QUESTIONNAIRE - PHQ9
2. FEELING DOWN, DEPRESSED OR HOPELESS: 0
SUM OF ALL RESPONSES TO PHQ QUESTIONS 1-9: 0
1. LITTLE INTEREST OR PLEASURE IN DOING THINGS: 0
SUM OF ALL RESPONSES TO PHQ QUESTIONS 1-9: 0
SUM OF ALL RESPONSES TO PHQ9 QUESTIONS 1 & 2: 0

## 2023-02-14 NOTE — PATIENT INSTRUCTIONS
Heart-Healthy Diet: Care Instructions  Your Care Instructions     A heart-healthy diet has lots of vegetables, fruits, nuts, beans, and whole grains, and is low in salt. It limits foods that are high in saturated fat, such as meats, cheeses, and fried foods. It may be hard to change your diet, but even small changes can lower your risk of heart attack and heart disease. Follow-up care is a key part of your treatment and safety. Be sure to make and go to all appointments, and call your doctor if you are having problems. It's also a good idea to know your test results and keep a list of the medicines you take. How can you care for yourself at home? Watch your portions  Learn what a serving is. A \"serving\" and a \"portion\" are not always the same thing. Make sure that you are not eating larger portions than are recommended. For example, a serving of pasta is ½ cup. A serving size of meat is 2 to 3 ounces. A 3-ounce serving is about the size of a deck of cards. Measure serving sizes until you are good at [place]" them. Keep in mind that restaurants often serve portions that are 2 or 3 times the size of one serving. To keep your energy level up and keep you from feeling hungry, eat often but in smaller portions. Eat only the number of calories you need to stay at a healthy weight. If you need to lose weight, eat fewer calories than your body burns (through exercise and other physical activity). Eat more fruits and vegetables  Eat a variety of fruit and vegetables every day. Dark green, deep orange, red, or yellow fruits and vegetables are especially good for you. Examples include spinach, carrots, peaches, and berries. Keep carrots, celery, and other veggies handy for snacks. Buy fruit that is in season and store it where you can see it so that you will be tempted to eat it. Cook dishes that have a lot of veggies in them, such as stir-fries and soups.   Limit saturated and trans fat  Read food labels, and try to avoid saturated and trans fats. They increase your risk of heart disease. Use olive or canola oil when you cook. Bake, broil, grill, or steam foods instead of frying them. Choose lean meats instead of high-fat meats such as hot dogs and sausages. Cut off all visible fat when you prepare meat. Eat fish, skinless poultry, and meat alternatives such as soy products instead of high-fat meats. Soy products, such as tofu, may be especially good for your heart. Choose low-fat or fat-free milk and dairy products. Eat foods high in fiber  Eat a variety of grain products every day. Include whole-grain foods that have lots of fiber and nutrients. Examples of whole-grain foods include oats, whole wheat bread, and brown rice. Buy whole-grain breads and cereals, instead of white bread or pastries. Limit salt and sodium  Limit how much salt and sodium you eat to help lower your blood pressure. Taste food before you salt it. Add only a little salt when you think you need it. With time, your taste buds will adjust to less salt. Eat fewer snack items, fast foods, and other high-salt, processed foods. Check food labels for the amount of sodium in packaged foods. Choose low-sodium versions of canned goods (such as soups, vegetables, and beans). Limit sugar  Limit drinks and foods with added sugar. These include candy, desserts, and soda pop. Limit alcohol  Limit alcohol to no more than 2 drinks a day for men and 1 drink a day for women. Too much alcohol can cause health problems. When should you call for help? Watch closely for changes in your health, and be sure to contact your doctor if:    You would like help planning heart-healthy meals. Where can you learn more? Go to http://www.vazquez.com/  Enter V137 in the search box to learn more about \"Heart-Healthy Diet: Care Instructions. \"  Current as of: August 22, 2019               Content Version: 12.6  © 8920-1178 [employer]. Care instructions adapted under license by [employer] (which disclaims liability or warranty for this information). If you have questions about a medical condition or this instruction, always ask your healthcare professional. [address] any warranty or liability for your use of this information.

## 2023-02-14 NOTE — PROGRESS NOTES
Sharon Carr presents today for   Chief Complaint   Patient presents with    Pre-op Exam                      1. \"Have you been to the ER, urgent care clinic since your last visit? Hospitalized since your last visit? \" No    2. \"Have you seen or consulted any other health care providers outside of the [address] [place] [address] since your last visit? \" no     3. For patients aged 39-70: Has the patient had a colonoscopy / FIT/ Cologuard? NA - based on age      If the patient is female:    4. For patients aged 41-77: Has the patient had a mammogram within the past 2 years? NA - based on age or sex      11. For patients aged 21-65: Has the patient had a pap smear?  NA - based on age or sex

## 2023-02-15 DIAGNOSIS — M17.11 PRIMARY OSTEOARTHRITIS OF RIGHT KNEE: Primary | ICD-10-CM

## 2023-02-15 DIAGNOSIS — Z01.811 PRE-OP CHEST EXAM: ICD-10-CM

## 2023-02-15 DIAGNOSIS — Z01.818 PRE-OP TESTING: ICD-10-CM

## 2023-02-15 LAB
EKG ATRIAL RATE: 53 BPM
EKG DIAGNOSIS: NORMAL
EKG P AXIS: 52 DEGREES
EKG P-R INTERVAL: 180 MS
EKG Q-T INTERVAL: 476 MS
EKG QRS DURATION: 86 MS
EKG QTC CALCULATION (BAZETT): 446 MS
EKG R AXIS: 31 DEGREES
EKG T AXIS: 37 DEGREES
EKG VENTRICULAR RATE: 53 BPM

## 2023-02-16 DIAGNOSIS — M17.11 UNILATERAL PRIMARY OSTEOARTHRITIS, RIGHT KNEE: Primary | ICD-10-CM

## 2023-02-23 ENCOUNTER — HOSPITAL ENCOUNTER (OUTPATIENT)
Facility: HOSPITAL | Age: 83
Discharge: HOME | End: 2023-02-23
Payer: MEDICARE

## 2023-02-23 ENCOUNTER — HOSPITAL ENCOUNTER (OUTPATIENT)
Facility: HOSPITAL | Age: 83
End: 2023-02-23
Payer: MEDICARE

## 2023-02-23 ENCOUNTER — OFFICE VISIT (OUTPATIENT)
Age: 83
End: 2023-02-23

## 2023-02-23 VITALS
HEART RATE: 62 BPM | SYSTOLIC BLOOD PRESSURE: 130 MMHG | OXYGEN SATURATION: 98 % | HEIGHT: 66 IN | DIASTOLIC BLOOD PRESSURE: 83 MMHG | WEIGHT: 160 LBS | BODY MASS INDEX: 25.71 KG/M2 | RESPIRATION RATE: 16 BRPM

## 2023-02-23 DIAGNOSIS — Z01.811 PRE-OP CHEST EXAM: ICD-10-CM

## 2023-02-23 DIAGNOSIS — E66.3 OVERWEIGHT (BMI 25.0-29.9): ICD-10-CM

## 2023-02-23 DIAGNOSIS — M17.11 PRIMARY OSTEOARTHRITIS OF RIGHT KNEE: Primary | ICD-10-CM

## 2023-02-23 DIAGNOSIS — M17.11 PRIMARY OSTEOARTHRITIS OF RIGHT KNEE: ICD-10-CM

## 2023-02-23 DIAGNOSIS — R73.01 IMPAIRED FASTING GLUCOSE: ICD-10-CM

## 2023-02-23 LAB
ABO + RH BLD: NORMAL
ALBUMIN SERPL-MCNC: 3.7 G/DL (ref 3.4–5)
ALBUMIN/GLOB SERPL: 1.5 (ref 0.8–1.7)
ALP SERPL-CCNC: 61 U/L (ref 45–117)
ALT SERPL-CCNC: 24 U/L (ref 16–61)
ANION GAP SERPL CALC-SCNC: 4 MMOL/L (ref 3–18)
APPEARANCE UR: CLEAR
APTT PPP: 36.9 SEC (ref 23–36.4)
AST SERPL-CCNC: 17 U/L (ref 10–38)
BASOPHILS # BLD: 0.1 K/UL (ref 0–0.1)
BASOPHILS NFR BLD: 1 % (ref 0–2)
BILIRUB SERPL-MCNC: 1.5 MG/DL (ref 0.2–1)
BILIRUB UR QL: NEGATIVE
BLOOD GROUP ANTIBODIES SERPL: NORMAL
BUN SERPL-MCNC: 11 MG/DL (ref 7–18)
BUN/CREAT SERPL: 12 (ref 12–20)
CALCIUM SERPL-MCNC: 9.3 MG/DL (ref 8.5–10.1)
CHLORIDE SERPL-SCNC: 110 MMOL/L (ref 100–111)
CO2 SERPL-SCNC: 26 MMOL/L (ref 21–32)
COLOR UR: YELLOW
CREAT SERPL-MCNC: 0.91 MG/DL (ref 0.6–1.3)
DIFFERENTIAL METHOD BLD: ABNORMAL
EOSINOPHIL # BLD: 0.1 K/UL (ref 0–0.4)
EOSINOPHIL NFR BLD: 1 % (ref 0–5)
ERYTHROCYTE [DISTWIDTH] IN BLOOD BY AUTOMATED COUNT: 13.3 % (ref 11.6–14.5)
EST. AVERAGE GLUCOSE BLD GHB EST-MCNC: 105 MG/DL
GLOBULIN SER CALC-MCNC: 2.5 G/DL (ref 2–4)
GLUCOSE SERPL-MCNC: 104 MG/DL (ref 74–99)
GLUCOSE UR STRIP.AUTO-MCNC: NEGATIVE MG/DL
HBA1C MFR BLD: 5.3 % (ref 4.2–5.6)
HCT VFR BLD AUTO: 43.3 % (ref 36–48)
HGB BLD-MCNC: 14.8 G/DL (ref 13–16)
HGB UR QL STRIP: NEGATIVE
IMM GRANULOCYTES # BLD AUTO: 0 K/UL (ref 0–0.04)
IMM GRANULOCYTES NFR BLD AUTO: 0 % (ref 0–0.5)
INR PPP: 1 (ref 0.8–1.2)
KETONES UR QL STRIP.AUTO: NEGATIVE MG/DL
LEUKOCYTE ESTERASE UR QL STRIP.AUTO: NEGATIVE
LYMPHOCYTES # BLD: 1 K/UL (ref 0.9–3.6)
LYMPHOCYTES NFR BLD: 15 % (ref 21–52)
MCH RBC QN AUTO: 31.2 PG (ref 24–34)
MCHC RBC AUTO-ENTMCNC: 34.2 G/DL (ref 31–37)
MCV RBC AUTO: 91.2 FL (ref 78–100)
MONOCYTES # BLD: 0.4 K/UL (ref 0.05–1.2)
MONOCYTES NFR BLD: 7 % (ref 3–10)
NEUTS SEG # BLD: 4.7 K/UL (ref 1.8–8)
NEUTS SEG NFR BLD: 76 % (ref 40–73)
NITRITE UR QL STRIP.AUTO: NEGATIVE
NRBC # BLD: 0 K/UL (ref 0–0.01)
NRBC BLD-RTO: 0 PER 100 WBC
PH UR STRIP: 6.5 (ref 5–8)
PLATELET # BLD AUTO: 179 K/UL (ref 135–420)
PMV BLD AUTO: 9.5 FL (ref 9.2–11.8)
POTASSIUM SERPL-SCNC: 3.8 MMOL/L (ref 3.5–5.5)
PROT SERPL-MCNC: 6.2 G/DL (ref 6.4–8.2)
PROT UR STRIP-MCNC: NEGATIVE MG/DL
PROTHROMBIN TIME: 14 SEC (ref 11.5–15.2)
RBC # BLD AUTO: 4.75 M/UL (ref 4.35–5.65)
SODIUM SERPL-SCNC: 140 MMOL/L (ref 136–145)
SP GR UR REFRACTOMETRY: 1.02 (ref 1–1.03)
SPECIMEN EXP DATE BLD: NORMAL
UROBILINOGEN UR QL STRIP.AUTO: 1 EU/DL (ref 0.2–1)
WBC # BLD AUTO: 6.2 K/UL (ref 4.6–13.2)

## 2023-02-23 PROCEDURE — 85025 COMPLETE CBC W/AUTO DIFF WBC: CPT

## 2023-02-23 PROCEDURE — 85610 PROTHROMBIN TIME: CPT

## 2023-02-23 PROCEDURE — 36415 COLL VENOUS BLD VENIPUNCTURE: CPT

## 2023-02-23 PROCEDURE — 71046 X-RAY EXAM CHEST 2 VIEWS: CPT

## 2023-02-23 PROCEDURE — 85730 THROMBOPLASTIN TIME PARTIAL: CPT

## 2023-02-23 PROCEDURE — 83036 HEMOGLOBIN GLYCOSYLATED A1C: CPT

## 2023-02-23 PROCEDURE — 81003 URINALYSIS AUTO W/O SCOPE: CPT

## 2023-02-23 PROCEDURE — 86850 RBC ANTIBODY SCREEN: CPT

## 2023-02-23 PROCEDURE — 80053 COMPREHEN METABOLIC PANEL: CPT

## 2023-02-23 NOTE — ASSESSMENT & PLAN NOTE
80-year-old male here for his preoperative appointment for a right medial unicompartmental knee arthroplasty. He is getting his fresh labs later today. Previous lab work has been unremarkable he has got his clearance. He has got his imaging. I did give him a corticosteroid injection on October 28 and the date of his surgery is scheduled for March 8 which is more than 3 months from the date of that injection. He says he got good relief from injection until about 3 weeks ago when his pain came back. His pain continues to be centered in the medial aspect of his knee that affects him with longer ambulation as well as stairs. His CT scan does show minor osteophytes off the patellofemoral and lateral joint line but he has significant arthritic changes in the medial compartment. We will go forward with a medial unicompartmental knee replacement on March 8, 2023. All patient's questions were answered appropriately. The patient to me that he recently broke a molar and will require a crown, however he spoke to his dentist and is okay with putting off for 3 months following his knee replacement. Surgery was discussed with the patient today. The risks and benefits of surgical and conservative (nonsurgical) treatment were discussed at length. The risks of surgery include but are not limited to pain, scar, infection, painful hardware, hardware failure, Deep Veinous Thrombosis/Pulmonary Embolism, anesthetic risks including heart attack/stroke, injury to nerves and/or blood vessels, bleeding, the need for further surgery and death. In the case of fracture repair, the risks also include nonunion or malunion. The recovery from surgery was also discussed at length. All of the patient's questions were entertained and answered. Understanding the diagnosis, risks and benefits of surgical treatment, the patient wishes to proceed with surgery.     The patient signed written consent with me for a right medial unicompartmental knee arthroplasty with Gibran protocol.

## 2023-02-23 NOTE — PROGRESS NOTES
Patient: Sav Burnette                MRN: [id number]       SSN: [id number]  YOB: 1940        AGE: 80 y.o. SEX: male    PCP: Dre Mckeon MD  02/23/23    Chief Complaint: H&P (Right knee )      Sav Burnette is a 80 y.o. male  1. Primary osteoarthritis of right knee  Assessment & Plan:  49-year-old male here for his preoperative appointment for a right medial unicompartmental knee arthroplasty. He is getting his fresh labs later today. Previous lab work has been unremarkable he has got his clearance. He has got his imaging. I did give him a corticosteroid injection on October 28 and the date of his surgery is scheduled for March 8 which is more than 3 months from the date of that injection. He says he got good relief from injection until about 3 weeks ago when his pain came back. His pain continues to be centered in the medial aspect of his knee that affects him with longer ambulation as well as stairs. His CT scan does show minor osteophytes off the patellofemoral and lateral joint line but he has significant arthritic changes in the medial compartment. We will go forward with a medial unicompartmental knee replacement on March 8, 2023. All patient's questions were answered appropriately. Surgery was discussed with the patient today. The risks and benefits of surgical and conservative (nonsurgical) treatment were discussed at length. The risks of surgery include but are not limited to pain, scar, infection, painful hardware, hardware failure, Deep Veinous Thrombosis/Pulmonary Embolism, anesthetic risks including heart attack/stroke, injury to nerves and/or blood vessels, bleeding, the need for further surgery and death. In the case of fracture repair, the risks also include nonunion or malunion. The recovery from surgery was also discussed at length. All of the patient's questions were entertained and answered.   Understanding the diagnosis, risks and benefits of surgical treatment, the patient wishes to proceed with surgery. The patient signed written consent with me for a right medial unicompartmental knee arthroplasty with Gibran protocol. 2. Overweight (BMI 25.0-29.9)  3. Impaired fasting glucose      HPI: 80-year-old male here for preoperative appointment for right medial compartment and unicompartmental knee arthroplasty on March 8, 2023. AMB PAIN ASSESSMENT 2/23/2023   Location of Pain Knee   Location Modifiers Right   Severity of Pain 2     Tobacco Use: Medium Risk    Smoking Tobacco Use: Former    Smokeless Tobacco Use: Never    Passive Exposure: Not on file           PHYSICAL EXAMINATION:  /83 (Site: Left Upper Arm, Position: Sitting, Cuff Size: Large Adult)   Pulse 62   Resp 16   Ht 5' 6\" (1.676 m)   Wt 160 lb (72.6 kg)   SpO2 98%   BMI 25.82 kg/m²   Body mass index is 25.82 kg/m². Wt Readings from Last 3 Encounters:   02/23/23 160 lb (72.6 kg)   02/14/23 162 lb 3.2 oz (73.6 kg)   11/28/22 169 lb 6.4 oz (76.8 kg)       GENERAL: Alert and oriented x3, in no acute distress. HEENT: Normocephalic, atraumatic. MSK: Right Knee Exam     Tenderness   The patient is experiencing tenderness in the medial joint line. Range of Motion   Extension:  5   Flexion:  120     Tests   Varus: negative Valgus: negative  Drawer:  Anterior - negative    Posterior - negative    Other   Erythema: absent  Sensation: normal  Pulse: present  Swelling: mild  Effusion: effusion present    Comments:  Negative patella crepitus    Positive pseudolaxity. IMAGING:  Imaging read by myself and interpreted as follows:    December 20, 2022:  CT scan with [facility] protocol of the right knee demonstrates significant osteoarthritis including subchondral sclerosis, subchondral cyst formation, joint space narrowing and osteophytosis in the medial compartment.   There are small osteophytes off the patellofemoral compartment and mild lateral patellofemoral joint space narrowing. Past Medical History:   Diagnosis Date    Abnormal glucose     Calculus of kidney     Colon polyps     Diverticulitis 2019    Diverticulosis of colon 2016    Colonoscopy (3/2016) advanced left sided diverticulosis    Essential hypertension 3/25/2017    pt denies    Hyperlipidemia     Vertigo 2020       Family History   Problem Relation Age of Onset    Elevated Lipids Mother     Osteoarthritis Mother     Heart Disease Father        Current Outpatient Medications   Medication Sig Dispense Refill    atorvastatin (LIPITOR) 20 MG tablet TAKE 1 TABLET DAILY      aspirin 81 MG chewable tablet Take 81 mg by mouth daily      Cholecalciferol 50 MCG ( UT) TABS Take 2,000 Units by mouth daily      psyllium (METAMUCIL SMOOTH TEXTURE) 58.6 % powder Take by mouth      meloxicam (MOBIC) 7.5 MG tablet Take 7.5 mg by mouth daily      atorvastatin (LIPITOR) 20 MG tablet        No current facility-administered medications for this visit. No Known Allergies    Past Surgical History:   Procedure Laterality Date    BLEPHAROPLASTY      CATARACT REMOVAL  2012    both eyes    COLONOSCOPY      2011, Dr. Kendra Jimenez, colon polyps, was going yearly    COLONOSCOPY N/A 12/15/2021    COLONOSCOPY with Polypectomies performed by Kecia Mcgarry MD at [facility]    COLONOSCOPY W/BIOPSY SINGLE/MULTIPLE  3-4-16    Dr. Willow Bradshaw             Social History     Socioeconomic History    Marital status:      Spouse name: Not on file    Number of children: Not on file    Years of education: Not on file    Highest education level: Not on file   Occupational History    Not on file   Tobacco Use    Smoking status: Former     Types: Cigarettes     Quit date: 1988     Years since quittin.2    Smokeless tobacco: Never   Substance and Sexual Activity    Alcohol use:  Yes     Alcohol/week: 1.0 standard drink    Drug use: No    Sexual activity: Not on file   Other Topics Concern Not on file   Social History Narrative    Not on file     Social Determinants of Health     Financial Resource Strain: Not on file   Food Insecurity: Not on file   Transportation Needs: Not on file   Physical Activity: Not on file   Stress: Not on file   Social Connections: Not on file   Intimate Partner Violence: Not on file   Housing Stability: Not on file       REVIEW OF SYSTEMS:      No changes from previous review of systems unless noted. Prescription medication management discussed with patient. Electronically signed by: Dayna Hyatt DO    Note: This note was completed using voice recognition software.   Any typographical/name errors or mistakes are unintentional.

## 2023-03-06 NOTE — PROGRESS NOTES
PRE-SURGICAL INSTRUCTIONS        Patient's Name:  Chandrakant Soto      XDFZJ'Y Date:  3/6/2023            Surgery Date:  3/8/2023                Do NOT eat or drink anything, including candy, gum, or ice chips after midnight on 3/8/23, unless you have specific instructions from your surgeon or anesthesia provider to do so. You may brush your teeth before coming to the hospital.  No smoking 24 hours prior to the day of surgery. No alcohol 24 hours prior to the day of surgery. No recreational drugs for one week prior to the day of surgery. Leave all valuables, including money/purse, at home. Remove all jewelry, nail polish, acrylic nails, and makeup (including mascara); no lotions powders, deodorant, or perfume/cologne/after shave on the skin. Follow instruction for Hibiclens washes and CHG wipes from surgeon's office. Glasses/contact lenses and dentures may be worn to the hospital.  They will be removed prior to surgery. Call your doctor if symptoms of a cold or illness develop within 24-48 hours prior to your surgery. 11.  If you are having an outpatient procedure, please make arrangements for a responsible ADULT TO [address] [place] [address] and stay with you for 24 hours after your surgery. 12. ONE VISITOR in the hospital at this time for outpatient procedures. Exceptions may be made for surgical admissions, per nursing unit guidelines      Special Instructions:      Bring list of CURRENT medications. Bring any pertinent legal medical records. Take these medications the morning of surgery with a sip of water:  none        On the day of surgery, come in the main entrance of [facility]. Let the  at the desk know you are there for surgery. A staff member will come escort you to the surgical area on the second floor.     If you have any questions or concerns, please do not hesitate to call:     (Prior to the day of surgery) [facility] department:  [phone]   (Day of surgery) Pre-Op department:  [phone]    These surgical instructions were reviewed with Louise Barger during the PAT phone call.

## 2023-03-07 ENCOUNTER — ANESTHESIA EVENT (OUTPATIENT)
Facility: HOSPITAL | Age: 83
End: 2023-03-07
Payer: MEDICARE

## 2023-03-07 NOTE — NURSE NAVIGATOR
Call placed to patient, ID verified x 2. Patient  has decided with their surgeon to have a partial knee replacement to decrease  pain and improve mobility . Topics discussed included surgery preparation, what to expect the day of surgery, medications, physical and occupational therapy, and discharge planning. It was discussed that this is considered an elective surgery and that prior to the surgery  decisions such as arranging for help at home once they are discharged needs to be made. Patient agreed to get home ready for surgery and to have a ride arranged to go home. He identifies his wife as his support system, has all required DME and would like to go home day of surgery once cleared by PT on 2 surgical. He has three stairs to enter his home and stairs to go up to his bedroom. Instructions were given for CHG bathing. Patient will complete the procedure the morning of surgery. Patient was reminded not to apply any deoderant, or lotions to skin the morning of surgery. He will remain NPO after midnight the night before surgery and will take only the medications as instructed to take by his  surgeon the morning of surgery with a sip of water. Patient verbalized that he does not have any medications that he will be taking the morning of surgery. A total knee replacement education book will be provided to patient post op prior to discharge. Education regarding the importance of early and frequent ambulation to avoid surgical complications and to assist with pain was provided. Recommended the use of ice to assist with pain and swelling post op for 20 minutes an hour, not to be placed directly on his skin. Patient verbalized understanding of all information provided. Opportunity was given to ask questions and phone number of the [facility]   was given for any questions or concerns that may arise later.

## 2023-03-07 NOTE — H&P
Ailyn Brennan DO   Physician   Specialty:  Orthopedic Surgery   Progress Notes      Signed   Encounter Date:  2/23/2023                 Signed        Expand AllCollapse All                                                                                                                                                                                                                                                                                                                                                                                                                                                                                                                                                                                         Patient: Remigio Read                MRN: [id number]       SSN: [id number]  YOB: 1940        AGE: 80 y.o. SEX: male     PCP: Gauri Bliss MD  02/23/23     Chief Complaint: H&P (Right knee )        Remigio Read is a 80 y.o. male  1. Primary osteoarthritis of right knee  Assessment & Plan:  69-year-old male here for his preoperative appointment for a right medial unicompartmental knee arthroplasty. He is getting his fresh labs later today. Previous lab work has been unremarkable he has got his clearance. He has got his imaging. I did give him a corticosteroid injection on October 28 and the date of his surgery is scheduled for March 8 which is more than 3 months from the date of that injection. He says he got good relief from injection until about 3 weeks ago when his pain came back. His pain continues to be centered in the medial aspect of his knee that affects him with longer ambulation as well as stairs. His CT scan does show minor osteophytes off the patellofemoral and lateral joint line but he has significant arthritic changes in the medial compartment. We will go forward with a medial unicompartmental knee replacement on March 8, 2023. All patient's questions were answered appropriately. Surgery was discussed with the patient today. The risks and benefits of surgical and conservative (nonsurgical) treatment were discussed at length. The risks of surgery include but are not limited to pain, scar, infection, painful hardware, hardware failure, Deep Veinous Thrombosis/Pulmonary Embolism, anesthetic risks including heart attack/stroke, injury to nerves and/or blood vessels, bleeding, the need for further surgery and death. In the case of fracture repair, the risks also include nonunion or malunion. The recovery from surgery was also discussed at length. All of the patient's questions were entertained and answered. Understanding the diagnosis, risks and benefits of surgical treatment, the patient wishes to proceed with surgery. The patient signed written consent with me for a right medial unicompartmental knee arthroplasty with Gibran protocol. 2. Overweight (BMI 25.0-29.9)  3. Impaired fasting glucose        HPI: 80-year-old male here for preoperative appointment for right medial compartment and unicompartmental knee arthroplasty on March 8, 2023. AMB PAIN ASSESSMENT 2/23/2023   Location of Pain Knee   Location Modifiers Right   Severity of Pain 2          Tobacco Use: Medium Risk    Smoking Tobacco Use: Former    Smokeless Tobacco Use: Never    Passive Exposure: Not on file               PHYSICAL EXAMINATION:  /83 (Site: Left Upper Arm, Position: Sitting, Cuff Size: Large Adult)   Pulse 62   Resp 16   Ht 5' 6\" (1.676 m)   Wt 160 lb (72.6 kg)   SpO2 98%   BMI 25.82 kg/m²   Body mass index is 25.82 kg/m². Wt Readings from Last 3 Encounters:   02/23/23 160 lb (72.6 kg)   02/14/23 162 lb 3.2 oz (73.6 kg)   11/28/22 169 lb 6.4 oz (76.8 kg)         GENERAL: Alert and oriented x3, in no acute distress. HEENT: Normocephalic, atraumatic.     MSK: Right Knee Exam      Tenderness   The patient is experiencing tenderness in the medial joint line. Range of Motion   Extension:  5   Flexion:  120      Tests   Varus: negative Valgus: negative  Drawer:  Anterior - negative    Posterior - negative     Other   Erythema: absent  Sensation: normal  Pulse: present  Swelling: mild  Effusion: effusion present     Comments:  Negative patella crepitus     Positive pseudolaxity. IMAGING:  Imaging read by myself and interpreted as follows:     December 20, 2022:  CT scan with [facility] protocol of the right knee demonstrates significant osteoarthritis including subchondral sclerosis, subchondral cyst formation, joint space narrowing and osteophytosis in the medial compartment. There are small osteophytes off the patellofemoral compartment and mild lateral patellofemoral joint space narrowing. Past Medical History        Past Medical History:   Diagnosis Date    Abnormal glucose      Calculus of kidney      Colon polyps      Diverticulitis 02/2019    Diverticulosis of colon 03/2016     Colonoscopy (3/2016) advanced left sided diverticulosis    Essential hypertension 3/25/2017     pt denies    Hyperlipidemia      Vertigo 07/2020            Family History         Family History   Problem Relation Age of Onset    Elevated Lipids Mother      Osteoarthritis Mother      Heart Disease Father              Current Facility-Administered Medications          Current Outpatient Medications   Medication Sig Dispense Refill    atorvastatin (LIPITOR) 20 MG tablet TAKE 1 TABLET DAILY        aspirin 81 MG chewable tablet Take 81 mg by mouth daily        Cholecalciferol 50 MCG (2000 UT) TABS Take 2,000 Units by mouth daily        psyllium (METAMUCIL SMOOTH TEXTURE) 58.6 % powder Take by mouth        meloxicam (MOBIC) 7.5 MG tablet Take 7.5 mg by mouth daily        atorvastatin (LIPITOR) 20 MG tablet            No current facility-administered medications for this visit.             No Known Allergies     Past Surgical History         Past Surgical History:   Procedure Laterality Date    BLEPHAROPLASTY        CATARACT REMOVAL   2012     both eyes    COLONOSCOPY         2011, Dr. Dioni Mcnally, colon polyps, was going yearly    COLONOSCOPY N/A 12/15/2021     COLONOSCOPY with Polypectomies performed by Bertrand Muñoz MD at [facility]    COLONOSCOPY W/BIOPSY SINGLE/MULTIPLE   3-4-16     Dr. Maritza Alan         [place]            Social History               Socioeconomic History    Marital status:        Spouse name: Not on file    Number of children: Not on file    Years of education: Not on file    Highest education level: Not on file   Occupational History    Not on file   Tobacco Use    Smoking status: Former       Types: Cigarettes       Quit date: 1988       Years since quittin.2    Smokeless tobacco: Never   Substance and Sexual Activity    Alcohol use: Yes       Alcohol/week: 1.0 standard drink    Drug use: No    Sexual activity: Not on file   Other Topics Concern    Not on file   Social History Narrative    Not on file      Social Determinants of Health      Financial Resource Strain: Not on file   Food Insecurity: Not on file   Transportation Needs: Not on file   Physical Activity: Not on file   Stress: Not on file   Social Connections: Not on file   Intimate Partner Violence: Not on file   Housing Stability: Not on file            REVIEW OF SYSTEMS:       No changes from previous review of systems unless noted. Prescription medication management discussed with patient. Electronically signed by: Ailyn Brennan DO     Note: This note was completed using voice recognition software.   Any typographical/name errors or mistakes are unintentional.               Office Visit on 2023    Office Visit on 2023      Detailed Report    Note shared with patient  Additional Documentation    Vitals:  /83 (Site: Left Upper Arm, Position: Sitting, Cuff Size: Large Adult)  Pulse 62  Resp 16  Ht 5' 6\" (1.676 m)  Wt 160 lb (72.6 kg)  SpO2 98%  BMI 25.82 kg/m²  BSA 1.84 m²  Pain Sc   2 (Loc: Knee)   Flowsheets:  Vitals Reassessment,  Calorie Assessment,  Pain Asessment     Encounter Info:  Billing Info,  History,  Allergies,  Detailed Report       Progress Notes Info    Author Note Status Last Update User Last Update Date/Time   Dalia Bunn DO Signed Lesia Fofana DO 2/23/2023  8:41 AM     Chart Review Routing History    No routing history on file. Orders Placed    None  Medication Changes      None     Medication List     Visit Diagnoses        Primary osteoarthritis of right knee        Overweight (BMI 25.0-29. 9)        Impaired fasting glucose       Problem List

## 2023-03-07 NOTE — DISCHARGE INSTRUCTIONS
OUTPATIENT MEDICATIONS    Tylenol (Acetaminophen) 500 mg: Take 2 tabs by mouth every 8 hours for pain. Celebrex (Celecoxib) 200 mg: Take 1 cap by mouth once daily with food in the evening. OR  Mobic (Meloxicam) 15 mg: Take 1 cap by mouth once daily with food in the evening. Ultram (Tramadol) 50 mg: Take 1 tab by mouth every 6 hours for pain not controlled by Tylenol. Oxycodone (Roxicodone) 5 mg: Take 1 tab by mouth every 4 hours as needed for pain not controlled by acetaminophen and Ultram (Tramadol)     Colace (Docusate sodium) 100 mg: Take 1 cap by mouth twice daily while taking narcotics to avoid constipation. Miralax (Polyethylene glycol): Mix 17 Grams with 8 oz. juice or water and take by mouth up to twice daily as needed for constipation. Pantoprazole (Protonix) 20 mg: Take 1 tablet daily while taking aspirin to prevent stomach ulcers. Aspirin EC 81 mg: Take 1 tablet by mouth twice daily for 6 weeks to prevent blood clots. OR  Eliquis (Apixaban) 2.5 mg: Take 1 tablet by mouth twice daily for 30 days to prevent blood clots. OR  Lovenox 40mg: Inject 40mg (or 30mg if have kidney disease) subcutaneously into abdomen daily for 30 days to prevent blood clots    Naloxone 4mg Nasal Spray: 1 spray in each nostril every 2-3 minutes as needed for narcotic overdose. ICE  ? Inflammation in the operative leg is normal for several months after surgery  ? Use ice for 20 minutes every hour if ice pack or continuously if using the Ice circulating machine. DO NOT apply heat to the wound. DISCHARGE ACTIVITY  ? You may put full weight on your knee. Use crutches or a walker for a minimum of 2 weeks to prevent falls. After you feel comfortable (everyone is different), slowly transition to a cane and then nothing. This can take anywhere from a few weeks to a few months. No running or high impact activities. ? Avoid ANY kneeling for 3 months. THERAPY AFTER A KNEE REPLACEMENT  ? Therapy is important after surgery. You must do the range of motion therapy and exercises several times a day! Walking is the most important exercise and cannot be stressed enough. You should increase the frequency and duration of your walks as you feel stronger. ? Ankle pumps: 3 sets of 10-15    Work on straightening the knee:  While lying flat on your back, drive your knee down into the bed to straighten it. Hold for 15 seconds. Repeat 10 times for 3-5 sets. While sitting in a chair, lift your leg straight and let it down slowly. Repeat 10 times for 3 sets. ? Prop the back of your foot on an ottoman or footstool and allow gravity to straighten it. You may place ice on the knee or gently push on your thigh to supplement gravity. You may do this as much as tolerated. You should feel a stretch in the back of your knee. Work on bending the knee:  While in a chair with your knee bent, slowly move your bottom forward to get more bend. Hold for 15 seconds and see if you can keep moving forward to bend. You want to see your knee form a right angle (90 degrees) by 4 weeks after surgery. ? While lying in bed, slowly move your heel towards your bottom. If you can, you may grab your shin to gently assist. This should not cause pain, but a stretch is OK. Be sure to ice several times a day to help decrease inflammation in the knee which is normal for several months after a knee replacement. BLOOD CLOT PREVENTION    You will be on a medication to prevent blood clots for 4-6 weeks after surgery. Take as directed until all tablets are taken. ?   Walking regularly every day helps decrease the risk of blood clots! COMPRESSION STOCKINGS     Wear knee-high compression stockings during the day as needed until swelling resolves. Remove stockings at bedtime. You may use an ace wrap instead of stockings to help with swelling. Apply the ace wrap from middle of calf to middle of thigh.     WOUND DRESSING INSTRUCTIONS    You may take off the ACE wrap and white, cotton wrap the day after your surgery. Please do not remove the bandage (looks like a clear, giant Band-Aide). Keep in place until follow-up appointment 10-14 days after surgery. Call the [facility]  at [phone] and request to speak to the Orthopedic Surgery on-call physician. SHOWERING  ? It is OK to shower ONLY if incision remains dry. ?   You may shower with the outer dressing in place, immediately following surgery. Make sure the edges are sealed to avoid getting the wound wet. If becomes wet under bandage, remove and call the [address]. After bandage is removed (at 2 week postoperative visit), you may shower and allow soapy water to flow over the incision, but DO NOT scrub the incision. Pat it dry with a towel. ?   Do not wash or scrub the incision for 6 weeks. ?   Do not apply topical antibiotics, creams, lotions, ointments, etc. to the incision for 6 weeks post-op. Do not submerge in water (pool/tub/bath/ocean/lake/etc.) until 4-6 weeks after surgery or until the incision has healed with no scabs remaining. PROCEDURES    Any elective dental cleaning/procedure or invasive procedures like a colonoscopy should not be performed within 3 months following a joint replacement. ?   You must take antibiotic prophylaxis before any dental cleaning or other invasive procedure. DRIVING  ? Necessary travel only for 6 weeks. ?   You should not drive until you are able to walk WITHOUT a walker (a cane is OK) and are not taking any narcotic pain medications. This is usually around 3-4 weeks. ? Most patients do not require a handicapped parking pass. If necessary, we will provide one for a maximum duration of 3 months. QUESTIONS/CONCERNS    Call the [facility]  at [phone] and request to speak to the Orthopedic Surgery on-call physician.      Medication Requests: Call the [employer] main number at [phone], or the [facility]  at [phone] and request to speak to the Orthopedic Surgery on-call [address]. POSTOPERATIVE APPOINTMENT    We will monitor your progress with follow-up clinic visits at approximately 2 and 6 weeks following your surgery. March 23, 2023 at 10:30AM at the [facility] office  ? Follow-up at the [facility] as scheduled previously. Phone: [phone]    DISCHARGE SUMMARY from Nurse    PATIENT INSTRUCTIONS:    After general anesthesia or intravenous sedation, for 24 hours or while taking prescription Narcotics:  Limit your activities  Do not drive and operate hazardous machinery  Do not make important personal or business decisions  Do  not drink alcoholic beverages  If you have not urinated within 8 hours after discharge, please contact your surgeon on call. Report the following to your surgeon:  Excessive pain, swelling, redness or odor of or around the surgical area  Temperature over 100.5  Nausea and vomiting lasting longer than 4 hours or if unable to take medications  Any signs of decreased circulation or nerve impairment to extremity: change in color, persistent  numbness, tingling, coldness or increase pain  Any questions    What to do at Home:  Recommended activity: activity as tolerated,     If you experience any of the following symptoms Refer to Post Operative instructions, please follow up with Dr. Sim Moses. *  Please give a list of your current medications to your Primary Care Provider. *  Please update this list whenever your medications are discontinued, doses are      changed, or new medications (including over-the-counter products) are added. *  Please carry medication information at all times in case of emergency situations.     These are general instructions for a healthy lifestyle:    No smoking/ No tobacco products/ Avoid exposure to second hand smoke  Surgeon General's Warning:  Quitting smoking now greatly reduces serious risk to your health. Obesity, smoking, and sedentary lifestyle greatly increases your risk for illness    A healthy diet, regular physical exercise & weight monitoring are important for maintaining a healthy lifestyle    You may be retaining fluid if you have a history of heart failure or if you experience any of the following symptoms:  Weight gain of 3 pounds or more overnight or 5 pounds in a week, increased swelling in our hands or feet or shortness of breath while lying flat in bed. Please call your doctor as soon as you notice any of these symptoms; do not wait until your next office visit. Patient armband removed and shredded   Thank you for enrolling in [address] Please follow the instructions below to securely access your online medical record. [email or web address] allows you to send messages to your doctor, view your test results, renew your prescriptions, schedule appointments, and more. How Do I Sign Up? In your Internet browser, go to [email or web address]  Click on the Sign Up Now link in the Sign In box. You will see the New Member Sign Up page. Enter your [email or web address] Access Code exactly as it appears below. You will not need to use this code after youve completed the sign-up process. If you do not sign up before the expiration date, you must request a new code. [email or web address] Access Code: Activation code not generated  Current [email or web address] Status: Active    Enter your Social Security Number ([id number]) and Date of Birth (mm/dd/yyyy) as indicated and click Submit. You will be taken to the next sign-up page. Create a [email or web address] ID. This will be your [employer] login ID and cannot be changed, so think of one that is secure and easy to remember. Create a [employer] password. You can change your password at any time. Enter your Password Reset Question and Answer. This can be used at a later time if you forget your password. Enter your e-mail address.  You will receive e-mail notification when new information is available in [employer]. Click Sign Up. You can now view your medical record. Additional Information  If you have questions, please contact your physician practice where you receive care. Remember, [employer] is NOT to be used for urgent needs. For medical emergencies, dial 911. The discharge information has been reviewed with the {PATIENT PARENT GUARDIAN:94252}. The {PATIENT PARENT GUARDIAN:28647} verbalized understanding. Discharge medications reviewed with the {Dishcarge meds reviewed PNKW:99113} and appropriate educational materials and side effects teaching were provided.   ___________________________________________________________________________________________________________________________________

## 2023-03-08 ENCOUNTER — APPOINTMENT (OUTPATIENT)
Facility: HOSPITAL | Age: 83
End: 2023-03-08
Attending: ORTHOPAEDIC SURGERY
Payer: MEDICARE

## 2023-03-08 ENCOUNTER — ANESTHESIA (OUTPATIENT)
Facility: HOSPITAL | Age: 83
End: 2023-03-08
Payer: MEDICARE

## 2023-03-08 ENCOUNTER — HOSPITAL ENCOUNTER (OUTPATIENT)
Facility: HOSPITAL | Age: 83
Setting detail: OBSERVATION
Discharge: HOME | End: 2023-03-09
Attending: ORTHOPAEDIC SURGERY | Admitting: ORTHOPAEDIC SURGERY
Payer: MEDICARE

## 2023-03-08 DIAGNOSIS — Z96.651 S/P RIGHT UNICOMPARTMENTAL KNEE REPLACEMENT: Primary | ICD-10-CM

## 2023-03-08 LAB
HCT VFR BLD AUTO: 43.9 % (ref 36–48)
HGB BLD-MCNC: 15 G/DL (ref 13–16)

## 2023-03-08 PROCEDURE — 2580000003 HC RX 258: Performed by: NURSE ANESTHETIST, CERTIFIED REGISTERED

## 2023-03-08 PROCEDURE — 2580000003 HC RX 258: Performed by: ORTHOPAEDIC SURGERY

## 2023-03-08 PROCEDURE — 2500000003 HC RX 250 WO HCPCS: Performed by: ANESTHESIOLOGY

## 2023-03-08 PROCEDURE — 51798 US URINE CAPACITY MEASURE: CPT

## 2023-03-08 PROCEDURE — 27446 REVISION OF KNEE JOINT: CPT | Performed by: ORTHOPAEDIC SURGERY

## 2023-03-08 PROCEDURE — 6370000000 HC RX 637 (ALT 250 FOR IP): Performed by: NURSE ANESTHETIST, CERTIFIED REGISTERED

## 2023-03-08 PROCEDURE — 7100000001 HC PACU RECOVERY - ADDTL 15 MIN: Performed by: ORTHOPAEDIC SURGERY

## 2023-03-08 PROCEDURE — 7100000000 HC PACU RECOVERY - FIRST 15 MIN: Performed by: ORTHOPAEDIC SURGERY

## 2023-03-08 PROCEDURE — 97116 GAIT TRAINING THERAPY: CPT

## 2023-03-08 PROCEDURE — 51701 INSERT BLADDER CATHETER: CPT

## 2023-03-08 PROCEDURE — 3600000012 HC SURGERY LEVEL 2 ADDTL 15MIN: Performed by: ORTHOPAEDIC SURGERY

## 2023-03-08 PROCEDURE — C1713 ANCHOR/SCREW BN/BN,TIS/BN: HCPCS | Performed by: ORTHOPAEDIC SURGERY

## 2023-03-08 PROCEDURE — G0378 HOSPITAL OBSERVATION PER HR: HCPCS

## 2023-03-08 PROCEDURE — 6360000002 HC RX W HCPCS: Performed by: ORTHOPAEDIC SURGERY

## 2023-03-08 PROCEDURE — A4217 STERILE WATER/SALINE, 500 ML: HCPCS | Performed by: ORTHOPAEDIC SURGERY

## 2023-03-08 PROCEDURE — 3600000002 HC SURGERY LEVEL 2 BASE: Performed by: ORTHOPAEDIC SURGERY

## 2023-03-08 PROCEDURE — 36415 COLL VENOUS BLD VENIPUNCTURE: CPT

## 2023-03-08 PROCEDURE — 2709999900 HC NON-CHARGEABLE SUPPLY: Performed by: ORTHOPAEDIC SURGERY

## 2023-03-08 PROCEDURE — 6370000000 HC RX 637 (ALT 250 FOR IP): Performed by: ORTHOPAEDIC SURGERY

## 2023-03-08 PROCEDURE — 6360000002 HC RX W HCPCS: Performed by: NURSE ANESTHETIST, CERTIFIED REGISTERED

## 2023-03-08 PROCEDURE — 97161 PT EVAL LOW COMPLEX 20 MIN: CPT

## 2023-03-08 PROCEDURE — C1776 JOINT DEVICE (IMPLANTABLE): HCPCS | Performed by: ORTHOPAEDIC SURGERY

## 2023-03-08 PROCEDURE — 2720000010 HC SURG SUPPLY STERILE: Performed by: ORTHOPAEDIC SURGERY

## 2023-03-08 PROCEDURE — 3700000001 HC ADD 15 MINUTES (ANESTHESIA): Performed by: ORTHOPAEDIC SURGERY

## 2023-03-08 PROCEDURE — 2500000003 HC RX 250 WO HCPCS: Performed by: ORTHOPAEDIC SURGERY

## 2023-03-08 PROCEDURE — 85014 HEMATOCRIT: CPT

## 2023-03-08 PROCEDURE — 64447 NJX AA&/STRD FEMORAL NRV IMG: CPT | Performed by: ANESTHESIOLOGY

## 2023-03-08 PROCEDURE — 2500000003 HC RX 250 WO HCPCS: Performed by: NURSE ANESTHETIST, CERTIFIED REGISTERED

## 2023-03-08 PROCEDURE — A4216 STERILE WATER/SALINE, 10 ML: HCPCS | Performed by: ORTHOPAEDIC SURGERY

## 2023-03-08 PROCEDURE — 3700000000 HC ANESTHESIA ATTENDED CARE: Performed by: ORTHOPAEDIC SURGERY

## 2023-03-08 PROCEDURE — 73560 X-RAY EXAM OF KNEE 1 OR 2: CPT

## 2023-03-08 PROCEDURE — 0055T BONE SRGRY CMPTR CT/MRI IMAG: CPT | Performed by: ORTHOPAEDIC SURGERY

## 2023-03-08 PROCEDURE — 6360000002 HC RX W HCPCS: Performed by: ANESTHESIOLOGY

## 2023-03-08 DEVICE — COMPONENT FEM SZ 15 L MEDIAL/RIGHT LAT UNI KNEE FOR: Type: IMPLANTABLE DEVICE | Site: KNEE | Status: FUNCTIONAL

## 2023-03-08 DEVICE — CEMENT BNE 20ML 41GM FULL DOSE PMMA W/ TOBRA M VISC RADPQ: Type: IMPLANTABLE DEVICE | Site: KNEE | Status: FUNCTIONAL

## 2023-03-08 DEVICE — IMPLANTABLE DEVICE: Type: IMPLANTABLE DEVICE | Site: KNEE | Status: FUNCTIONAL

## 2023-03-08 DEVICE — COMPONENT KNEE CMNTLS K1 PREMIER: Type: IMPLANTABLE DEVICE | Site: KNEE | Status: FUNCTIONAL

## 2023-03-08 RX ORDER — PREGABALIN 75 MG/1
75 CAPSULE ORAL ONCE
Status: COMPLETED | OUTPATIENT
Start: 2023-03-08 | End: 2023-03-08

## 2023-03-08 RX ORDER — VANCOMYCIN HYDROCHLORIDE 1 G/20ML
INJECTION, POWDER, LYOPHILIZED, FOR SOLUTION INTRAVENOUS PRN
Status: DISCONTINUED | OUTPATIENT
Start: 2023-03-08 | End: 2023-03-08 | Stop reason: ALTCHOICE

## 2023-03-08 RX ORDER — BUPIVACAINE HYDROCHLORIDE 7.5 MG/ML
INJECTION, SOLUTION EPIDURAL; RETROBULBAR
Status: COMPLETED | OUTPATIENT
Start: 2023-03-08 | End: 2023-03-08

## 2023-03-08 RX ORDER — SODIUM CHLORIDE 0.9 % (FLUSH) 0.9 %
5-40 SYRINGE (ML) INJECTION EVERY 12 HOURS SCHEDULED
Status: DISCONTINUED | OUTPATIENT
Start: 2023-03-08 | End: 2023-03-09 | Stop reason: HOSPADM

## 2023-03-08 RX ORDER — OXYCODONE HYDROCHLORIDE 5 MG/1
10 TABLET ORAL EVERY 4 HOURS PRN
Status: DISCONTINUED | OUTPATIENT
Start: 2023-03-08 | End: 2023-03-09 | Stop reason: HOSPADM

## 2023-03-08 RX ORDER — TRAMADOL HYDROCHLORIDE 50 MG/1
50 TABLET ORAL EVERY 6 HOURS PRN
Qty: 42 TABLET | Refills: 0 | Status: SHIPPED | OUTPATIENT
Start: 2023-03-08 | End: 2023-03-15

## 2023-03-08 RX ORDER — ACETAMINOPHEN 500 MG
1000 TABLET ORAL EVERY 8 HOURS SCHEDULED
Status: DISCONTINUED | OUTPATIENT
Start: 2023-03-08 | End: 2023-03-09 | Stop reason: HOSPADM

## 2023-03-08 RX ORDER — PHENYLEPHRINE HCL IN 0.9% NACL 1 MG/10 ML
SYRINGE (ML) INTRAVENOUS PRN
Status: DISCONTINUED | OUTPATIENT
Start: 2023-03-08 | End: 2023-03-08 | Stop reason: SDUPTHER

## 2023-03-08 RX ORDER — SODIUM CHLORIDE 0.9 % (FLUSH) 0.9 %
5-40 SYRINGE (ML) INJECTION PRN
Status: DISCONTINUED | OUTPATIENT
Start: 2023-03-08 | End: 2023-03-08 | Stop reason: HOSPADM

## 2023-03-08 RX ORDER — OXYCODONE HYDROCHLORIDE 5 MG/1
5 TABLET ORAL EVERY 4 HOURS PRN
Status: DISCONTINUED | OUTPATIENT
Start: 2023-03-08 | End: 2023-03-09 | Stop reason: HOSPADM

## 2023-03-08 RX ORDER — PANTOPRAZOLE SODIUM 40 MG/1
40 TABLET, DELAYED RELEASE ORAL DAILY
Qty: 30 TABLET | Refills: 0 | Status: SHIPPED | OUTPATIENT
Start: 2023-03-08 | End: 2023-04-07

## 2023-03-08 RX ORDER — POLYETHYLENE GLYCOL 3350 17 G/17G
17 POWDER, FOR SOLUTION ORAL DAILY PRN
Status: DISCONTINUED | OUTPATIENT
Start: 2023-03-08 | End: 2023-03-09 | Stop reason: HOSPADM

## 2023-03-08 RX ORDER — PROPOFOL 10 MG/ML
INJECTION, EMULSION INTRAVENOUS CONTINUOUS PRN
Status: DISCONTINUED | OUTPATIENT
Start: 2023-03-08 | End: 2023-03-08 | Stop reason: SDUPTHER

## 2023-03-08 RX ORDER — MIDAZOLAM HYDROCHLORIDE 2 MG/2ML
2 INJECTION, SOLUTION INTRAMUSCULAR; INTRAVENOUS ONCE
Status: COMPLETED | OUTPATIENT
Start: 2023-03-08 | End: 2023-03-08

## 2023-03-08 RX ORDER — ROPIVACAINE HYDROCHLORIDE 2 MG/ML
INJECTION, SOLUTION EPIDURAL; INFILTRATION; PERINEURAL
Status: COMPLETED | OUTPATIENT
Start: 2023-03-08 | End: 2023-03-08

## 2023-03-08 RX ORDER — ONDANSETRON 2 MG/ML
4 INJECTION INTRAMUSCULAR; INTRAVENOUS
Status: DISCONTINUED | OUTPATIENT
Start: 2023-03-08 | End: 2023-03-09 | Stop reason: HOSPADM

## 2023-03-08 RX ORDER — DEXAMETHASONE SODIUM PHOSPHATE 10 MG/ML
10 INJECTION, SOLUTION INTRAMUSCULAR; INTRAVENOUS ONCE
Status: COMPLETED | OUTPATIENT
Start: 2023-03-08 | End: 2023-03-08

## 2023-03-08 RX ORDER — ONDANSETRON 2 MG/ML
4 INJECTION INTRAMUSCULAR; INTRAVENOUS EVERY 6 HOURS PRN
Status: DISCONTINUED | OUTPATIENT
Start: 2023-03-08 | End: 2023-03-09 | Stop reason: HOSPADM

## 2023-03-08 RX ORDER — SODIUM CHLORIDE, SODIUM LACTATE, POTASSIUM CHLORIDE, CALCIUM CHLORIDE 600; 310; 30; 20 MG/100ML; MG/100ML; MG/100ML; MG/100ML
INJECTION, SOLUTION INTRAVENOUS CONTINUOUS
Status: DISCONTINUED | OUTPATIENT
Start: 2023-03-08 | End: 2023-03-09 | Stop reason: HOSPADM

## 2023-03-08 RX ORDER — ASPIRIN 81 MG/1
81 TABLET ORAL 2 TIMES DAILY
Status: DISCONTINUED | OUTPATIENT
Start: 2023-03-09 | End: 2023-03-09 | Stop reason: HOSPADM

## 2023-03-08 RX ORDER — DOCUSATE SODIUM 100 MG/1
100 CAPSULE, LIQUID FILLED ORAL 2 TIMES DAILY
Qty: 60 CAPSULE | Refills: 0 | Status: SHIPPED | OUTPATIENT
Start: 2023-03-08 | End: 2023-04-07

## 2023-03-08 RX ORDER — FENTANYL CITRATE 50 UG/ML
50 INJECTION, SOLUTION INTRAMUSCULAR; INTRAVENOUS EVERY 5 MIN PRN
Status: DISCONTINUED | OUTPATIENT
Start: 2023-03-08 | End: 2023-03-09 | Stop reason: HOSPADM

## 2023-03-08 RX ORDER — DIPHENHYDRAMINE HCL 25 MG
25 CAPSULE ORAL EVERY 6 HOURS PRN
Status: DISCONTINUED | OUTPATIENT
Start: 2023-03-08 | End: 2023-03-09 | Stop reason: HOSPADM

## 2023-03-08 RX ORDER — LIDOCAINE HYDROCHLORIDE 10 MG/ML
1 INJECTION, SOLUTION EPIDURAL; INFILTRATION; INTRACAUDAL; PERINEURAL
Status: COMPLETED | OUTPATIENT
Start: 2023-03-08 | End: 2023-03-08

## 2023-03-08 RX ORDER — SODIUM CHLORIDE 9 MG/ML
INJECTION, SOLUTION INTRAVENOUS PRN
Status: DISCONTINUED | OUTPATIENT
Start: 2023-03-08 | End: 2023-03-09 | Stop reason: HOSPADM

## 2023-03-08 RX ORDER — FAMOTIDINE 20 MG/1
20 TABLET, FILM COATED ORAL ONCE
Status: COMPLETED | OUTPATIENT
Start: 2023-03-08 | End: 2023-03-08

## 2023-03-08 RX ORDER — SODIUM CHLORIDE, SODIUM LACTATE, POTASSIUM CHLORIDE, CALCIUM CHLORIDE 600; 310; 30; 20 MG/100ML; MG/100ML; MG/100ML; MG/100ML
INJECTION, SOLUTION INTRAVENOUS CONTINUOUS
Status: DISCONTINUED | OUTPATIENT
Start: 2023-03-08 | End: 2023-03-08 | Stop reason: HOSPADM

## 2023-03-08 RX ORDER — SODIUM CHLORIDE 0.9 % (FLUSH) 0.9 %
5-40 SYRINGE (ML) INJECTION PRN
Status: DISCONTINUED | OUTPATIENT
Start: 2023-03-08 | End: 2023-03-09 | Stop reason: HOSPADM

## 2023-03-08 RX ORDER — OXYCODONE HYDROCHLORIDE 5 MG/1
5 TABLET ORAL EVERY 6 HOURS PRN
Qty: 28 TABLET | Refills: 0 | Status: SHIPPED | OUTPATIENT
Start: 2023-03-08 | End: 2023-03-15

## 2023-03-08 RX ORDER — ACETAMINOPHEN 500 MG
1000 TABLET ORAL ONCE
Status: COMPLETED | OUTPATIENT
Start: 2023-03-08 | End: 2023-03-08

## 2023-03-08 RX ORDER — TAMSULOSIN HYDROCHLORIDE 0.4 MG/1
0.4 CAPSULE ORAL DAILY
Status: DISCONTINUED | OUTPATIENT
Start: 2023-03-08 | End: 2023-03-08 | Stop reason: HOSPADM

## 2023-03-08 RX ORDER — ONDANSETRON 2 MG/ML
INJECTION INTRAMUSCULAR; INTRAVENOUS PRN
Status: DISCONTINUED | OUTPATIENT
Start: 2023-03-08 | End: 2023-03-08 | Stop reason: SDUPTHER

## 2023-03-08 RX ORDER — FENTANYL CITRATE 50 UG/ML
100 INJECTION, SOLUTION INTRAMUSCULAR; INTRAVENOUS ONCE
Status: COMPLETED | OUTPATIENT
Start: 2023-03-08 | End: 2023-03-08

## 2023-03-08 RX ORDER — MELOXICAM 15 MG/1
15 TABLET ORAL DAILY
Qty: 30 TABLET | Refills: 0 | Status: SHIPPED | OUTPATIENT
Start: 2023-03-08 | End: 2023-04-07

## 2023-03-08 RX ORDER — ONDANSETRON 4 MG/1
4 TABLET, ORALLY DISINTEGRATING ORAL EVERY 8 HOURS PRN
Status: DISCONTINUED | OUTPATIENT
Start: 2023-03-08 | End: 2023-03-09 | Stop reason: HOSPADM

## 2023-03-08 RX ORDER — ASPIRIN 81 MG/1
81 TABLET, CHEWABLE ORAL 2 TIMES DAILY
Qty: 60 TABLET | Refills: 0 | Status: SHIPPED | OUTPATIENT
Start: 2023-03-08 | End: 2023-04-07

## 2023-03-08 RX ORDER — FAMOTIDINE 20 MG/1
20 TABLET, FILM COATED ORAL 2 TIMES DAILY
Status: DISCONTINUED | OUTPATIENT
Start: 2023-03-08 | End: 2023-03-09 | Stop reason: HOSPADM

## 2023-03-08 RX ORDER — SODIUM PHOSPHATE, DIBASIC AND SODIUM PHOSPHATE, MONOBASIC 7; 19 G/133ML; G/133ML
1 ENEMA RECTAL DAILY PRN
Status: DISCONTINUED | OUTPATIENT
Start: 2023-03-08 | End: 2023-03-09 | Stop reason: HOSPADM

## 2023-03-08 RX ORDER — KETOROLAC TROMETHAMINE 15 MG/ML
15 INJECTION, SOLUTION INTRAMUSCULAR; INTRAVENOUS EVERY 6 HOURS
Status: DISCONTINUED | OUTPATIENT
Start: 2023-03-08 | End: 2023-03-09 | Stop reason: HOSPADM

## 2023-03-08 RX ORDER — FENTANYL CITRATE 50 UG/ML
INJECTION, SOLUTION INTRAMUSCULAR; INTRAVENOUS PRN
Status: DISCONTINUED | OUTPATIENT
Start: 2023-03-08 | End: 2023-03-08 | Stop reason: SDUPTHER

## 2023-03-08 RX ORDER — PROPOFOL 10 MG/ML
INJECTION, EMULSION INTRAVENOUS PRN
Status: DISCONTINUED | OUTPATIENT
Start: 2023-03-08 | End: 2023-03-08 | Stop reason: SDUPTHER

## 2023-03-08 RX ORDER — MELOXICAM 7.5 MG/1
7.5 TABLET ORAL DAILY
Status: DISCONTINUED | OUTPATIENT
Start: 2023-03-09 | End: 2023-03-09 | Stop reason: HOSPADM

## 2023-03-08 RX ORDER — SODIUM CHLORIDE 0.9 % (FLUSH) 0.9 %
5-40 SYRINGE (ML) INJECTION EVERY 12 HOURS SCHEDULED
Status: DISCONTINUED | OUTPATIENT
Start: 2023-03-08 | End: 2023-03-08 | Stop reason: HOSPADM

## 2023-03-08 RX ORDER — DIPHENHYDRAMINE HYDROCHLORIDE 50 MG/ML
25 INJECTION INTRAMUSCULAR; INTRAVENOUS EVERY 6 HOURS PRN
Status: DISCONTINUED | OUTPATIENT
Start: 2023-03-08 | End: 2023-03-09 | Stop reason: HOSPADM

## 2023-03-08 RX ORDER — ACETAMINOPHEN 500 MG
1000 TABLET ORAL EVERY 8 HOURS
Qty: 180 TABLET | Refills: 0 | Status: SHIPPED | OUTPATIENT
Start: 2023-03-08 | End: 2023-04-07

## 2023-03-08 RX ORDER — ROPIVACAINE HYDROCHLORIDE 2 MG/ML
10 INJECTION, SOLUTION EPIDURAL; INFILTRATION; PERINEURAL ONCE
Status: DISCONTINUED | OUTPATIENT
Start: 2023-03-08 | End: 2023-03-08 | Stop reason: HOSPADM

## 2023-03-08 RX ORDER — MELOXICAM 7.5 MG/1
7.5 TABLET ORAL ONCE
Status: COMPLETED | OUTPATIENT
Start: 2023-03-08 | End: 2023-03-08

## 2023-03-08 RX ORDER — TRAMADOL HYDROCHLORIDE 50 MG/1
50 TABLET ORAL EVERY 6 HOURS PRN
Status: DISCONTINUED | OUTPATIENT
Start: 2023-03-08 | End: 2023-03-09 | Stop reason: HOSPADM

## 2023-03-08 RX ORDER — LIDOCAINE HYDROCHLORIDE 10 MG/ML
5 INJECTION, SOLUTION EPIDURAL; INFILTRATION; INTRACAUDAL; PERINEURAL ONCE
Status: DISCONTINUED | OUTPATIENT
Start: 2023-03-08 | End: 2023-03-08 | Stop reason: HOSPADM

## 2023-03-08 RX ORDER — SODIUM CHLORIDE 9 MG/ML
INJECTION, SOLUTION INTRAVENOUS PRN
Status: DISCONTINUED | OUTPATIENT
Start: 2023-03-08 | End: 2023-03-08 | Stop reason: HOSPADM

## 2023-03-08 RX ORDER — ONDANSETRON 8 MG/1
8 TABLET, ORALLY DISINTEGRATING ORAL EVERY 8 HOURS PRN
Qty: 10 TABLET | Refills: 0 | Status: SHIPPED | OUTPATIENT
Start: 2023-03-08

## 2023-03-08 RX ADMIN — DEXMEDETOMIDINE HYDROCHLORIDE 4 MCG: 100 INJECTION, SOLUTION INTRAVENOUS at 11:31

## 2023-03-08 RX ADMIN — FENTANYL CITRATE 25 MCG: 50 INJECTION, SOLUTION INTRAMUSCULAR; INTRAVENOUS at 12:04

## 2023-03-08 RX ADMIN — TRANEXAMIC ACID 1000 MG: 100 INJECTION, SOLUTION INTRAVENOUS at 10:30

## 2023-03-08 RX ADMIN — DEXAMETHASONE SODIUM PHOSPHATE 10 MG: 10 INJECTION INTRAMUSCULAR; INTRAVENOUS at 10:55

## 2023-03-08 RX ADMIN — TAMSULOSIN HYDROCHLORIDE 0.4 MG: 0.4 CAPSULE ORAL at 09:18

## 2023-03-08 RX ADMIN — KETOROLAC TROMETHAMINE 15 MG: 15 INJECTION, SOLUTION INTRAMUSCULAR; INTRAVENOUS at 16:03

## 2023-03-08 RX ADMIN — SODIUM CHLORIDE, POTASSIUM CHLORIDE, SODIUM LACTATE AND CALCIUM CHLORIDE: 600; 310; 30; 20 INJECTION, SOLUTION INTRAVENOUS at 15:58

## 2023-03-08 RX ADMIN — BISACODYL 5 MG: 5 TABLET, COATED ORAL at 15:58

## 2023-03-08 RX ADMIN — PROPOFOL 20 MG: 10 INJECTION, EMULSION INTRAVENOUS at 10:20

## 2023-03-08 RX ADMIN — ACETAMINOPHEN 1000 MG: 500 TABLET ORAL at 09:17

## 2023-03-08 RX ADMIN — PROPOFOL 40 MG: 10 INJECTION, EMULSION INTRAVENOUS at 10:30

## 2023-03-08 RX ADMIN — SODIUM CHLORIDE, PRESERVATIVE FREE 10 ML: 5 INJECTION INTRAVENOUS at 21:52

## 2023-03-08 RX ADMIN — MIDAZOLAM HYDROCHLORIDE 2 MG: 1 INJECTION, SOLUTION INTRAMUSCULAR; INTRAVENOUS at 09:44

## 2023-03-08 RX ADMIN — KETOROLAC TROMETHAMINE 15 MG: 15 INJECTION, SOLUTION INTRAMUSCULAR; INTRAVENOUS at 21:50

## 2023-03-08 RX ADMIN — CEFAZOLIN 2000 MG: 1 INJECTION, POWDER, FOR SOLUTION INTRAMUSCULAR; INTRAVENOUS at 18:00

## 2023-03-08 RX ADMIN — BUPIVACAINE HYDROCHLORIDE 10.5 MG: 7.5 INJECTION, SOLUTION EPIDURAL; RETROBULBAR at 10:15

## 2023-03-08 RX ADMIN — MELOXICAM 7.5 MG: 7.5 TABLET ORAL at 09:18

## 2023-03-08 RX ADMIN — ROPIVACAINE HYDROCHLORIDE 20 ML: 2 INJECTION, SOLUTION EPIDURAL; INFILTRATION at 09:43

## 2023-03-08 RX ADMIN — ACETAMINOPHEN 1000 MG: 500 TABLET ORAL at 15:58

## 2023-03-08 RX ADMIN — DEXAMETHASONE SODIUM PHOSPHATE 10 MG: 10 INJECTION INTRAMUSCULAR; INTRAVENOUS at 16:07

## 2023-03-08 RX ADMIN — SODIUM CHLORIDE, POTASSIUM CHLORIDE, SODIUM LACTATE AND CALCIUM CHLORIDE: 600; 310; 30; 20 INJECTION, SOLUTION INTRAVENOUS at 23:28

## 2023-03-08 RX ADMIN — PROPOFOL 50 MCG/KG/MIN: 10 INJECTION, EMULSION INTRAVENOUS at 10:20

## 2023-03-08 RX ADMIN — ONDANSETRON 4 MG: 2 INJECTION INTRAMUSCULAR; INTRAVENOUS at 12:31

## 2023-03-08 RX ADMIN — PREGABALIN 75 MG: 75 CAPSULE ORAL at 09:17

## 2023-03-08 RX ADMIN — SODIUM CHLORIDE, POTASSIUM CHLORIDE, SODIUM LACTATE AND CALCIUM CHLORIDE: 600; 310; 30; 20 INJECTION, SOLUTION INTRAVENOUS at 09:40

## 2023-03-08 RX ADMIN — Medication 100 MCG: at 12:28

## 2023-03-08 RX ADMIN — DEXMEDETOMIDINE HYDROCHLORIDE 2 MCG: 100 INJECTION, SOLUTION INTRAVENOUS at 12:11

## 2023-03-08 RX ADMIN — FENTANYL CITRATE 25 MCG: 50 INJECTION, SOLUTION INTRAMUSCULAR; INTRAVENOUS at 11:35

## 2023-03-08 RX ADMIN — FAMOTIDINE 20 MG: 20 TABLET ORAL at 09:18

## 2023-03-08 RX ADMIN — ACETAMINOPHEN 1000 MG: 500 TABLET ORAL at 21:49

## 2023-03-08 RX ADMIN — LIDOCAINE HYDROCHLORIDE 1 ML: 10 INJECTION, SOLUTION EPIDURAL; INFILTRATION; INTRACAUDAL; PERINEURAL at 09:45

## 2023-03-08 RX ADMIN — FENTANYL CITRATE 100 MCG: 50 INJECTION INTRAMUSCULAR; INTRAVENOUS at 09:44

## 2023-03-08 RX ADMIN — DEXMEDETOMIDINE HYDROCHLORIDE 4 MCG: 100 INJECTION, SOLUTION INTRAVENOUS at 11:44

## 2023-03-08 RX ADMIN — TRANEXAMIC ACID 1000 MG: 100 INJECTION, SOLUTION INTRAVENOUS at 12:24

## 2023-03-08 RX ADMIN — WATER 2000 MG: 1 INJECTION, SOLUTION INTRAMUSCULAR; INTRAVENOUS; SUBCUTANEOUS at 10:30

## 2023-03-08 ASSESSMENT — PAIN SCALES - GENERAL
PAINLEVEL_OUTOF10: 3
PAINLEVEL_OUTOF10: 0
PAINLEVEL_OUTOF10: 2
PAINLEVEL_OUTOF10: 0
PAINLEVEL_OUTOF10: 0

## 2023-03-08 ASSESSMENT — PAIN DESCRIPTION - ORIENTATION
ORIENTATION: LOWER
ORIENTATION: RIGHT

## 2023-03-08 ASSESSMENT — PAIN DESCRIPTION - DESCRIPTORS
DESCRIPTORS: ACHING

## 2023-03-08 ASSESSMENT — PAIN - FUNCTIONAL ASSESSMENT
PAIN_FUNCTIONAL_ASSESSMENT: PREVENTS OR INTERFERES SOME ACTIVE ACTIVITIES AND ADLS
PAIN_FUNCTIONAL_ASSESSMENT: PREVENTS OR INTERFERES SOME ACTIVE ACTIVITIES AND ADLS
PAIN_FUNCTIONAL_ASSESSMENT: 0-10

## 2023-03-08 ASSESSMENT — PAIN DESCRIPTION - LOCATION
LOCATION: KNEE
LOCATION: LEG;INCISION
LOCATION: INCISION;KNEE
LOCATION: KNEE

## 2023-03-08 ASSESSMENT — PAIN SCALES - WONG BAKER: WONGBAKER_NUMERICALRESPONSE: 0

## 2023-03-08 NOTE — PERIOP NOTE
TRANSFER - OUT REPORT:    Verbal report given to Madhuri Barron on Maggie Asper  being transferred to 2 Surgical for routine post-op       Report consisted of patient's Situation, Background, Assessment and   Recommendations(SBAR). Information from the following report(s) Nurse Handoff Report and MAR was reviewed with the receiving nurse. [place] Assessment: No data recorded  Lines:   Peripheral IV 03/08/23 Right Antecubital (Active)   Site Assessment Clean, dry & intact 03/08/23 1310   Phlebitis Assessment No symptoms 03/08/23 1310   Infiltration Assessment 0 03/08/23 1310   Dressing Status Clean, dry & intact 03/08/23 1310   Dressing Type Transparent 03/08/23 1310        Opportunity for questions and clarification was provided.       Patient transported with:  Patient Transport Bed

## 2023-03-08 NOTE — PROGRESS NOTES
conducted an initial consultation and Spiritual Assessment for Rhonda Talley, who is a 80 y. o.,male. Patient's Primary Language is: [place]. According to the patient's EMR Mu-ism Affiliation is: Laura Teresa.     The reason the Patient came to the hospital is:   Patient Active Problem List    Diagnosis Date Noted    Primary osteoarthritis of right knee 02/23/2023    History of diverticulitis     Overweight (BMI 25.0-29.9) 04/16/2021    Allergic rhinitis 04/16/2021    Transient speech disturbance 04/16/2021    Vestibular neuronitis 07/12/2020    History of vitamin D deficiency 07/12/2020    Diverticulosis of large intestine without hemorrhage 03/25/2017    Essential hypertension 03/25/2017    Impaired fasting glucose 10/30/2013    Hyperlipidemia     Colon polyps         The  provided the following Interventions:  Initiated a relationship of care and support. Explored issues of sharron, belief, spirituality and Shinto/ritual needs while hospitalized. Listened empathically. Provided information about Spiritual Care Services. Chart reviewed. The following outcomes where achieved:   confirmed Patient's Mu-ism Affiliation. Patient expressed gratitude for 's visit. Assessment:  Patient does not have any Shinto/cultural needs that will affect patient's preferences in health care. There are no spiritual or Shinto issues which require intervention at this time. Plan:  Chaplains will continue to follow and will provide pastoral care on an as needed/requested basis.  recommends bedside caregivers page  on duty if patient shows signs of acute spiritual or emotional distress.     [address]   [phone]

## 2023-03-08 NOTE — ANESTHESIA POSTPROCEDURE EVALUATION
Department of Anesthesiology  Postprocedure Note    Patient: Chandrakant Soto  MRN: [id number]  YOB: 1940  Date of evaluation: 3/8/2023      Procedure Summary     Date: 03/08/23 Room / Location: [facility] MAIN 05 / [facility] MAIN OR    Anesthesia Start: 1010 Anesthesia Stop: 3769    Procedure: RIGHT MEDIAL UNICOMPARTMENTAL KNEE ARTHROPLASTY, JENNIFER SARA (Right: Knee) Diagnosis:       Osteoarthritis of right knee, unspecified osteoarthritis type      (M17.11 RIGHT KNEE DEGENERATIVE JOINT DISEASE)    Surgeons: Sherren Harris, DO Responsible Provider: Belle Moran MD    Anesthesia Type: Regional, Spinal ASA Status: 2          Anesthesia Type: Regional, Spinal    Lawanda Phase I: Lawanda Score: 10    Lawanda Phase II:        Anesthesia Post Evaluation    Patient location during evaluation: PACU  Patient participation: complete - patient participated  Level of consciousness: awake  Pain score: 2  Airway patency: patent  Nausea & Vomiting: no nausea and no vomiting  Complications: no  Cardiovascular status: hemodynamically stable  Respiratory status: acceptable  Hydration status: euvolemic

## 2023-03-08 NOTE — ANESTHESIA PRE PROCEDURE
Department of Anesthesiology  Preprocedure Note       Name:  Mckayla Escalante   Age:  80 y.o.  :  1940                                          MRN:  [id number]         Date:  3/8/2023      Surgeon: Robles Martin):  Haydee Ronquillo DO    Procedure: Procedure(s):  RIGHT MEDIAL UNICOMPARTMENTAL KNEE ARTHROPLASTY, JENNIFER SARA; ADDUCTOR CANAL BLOCK; 23 HR    Medications prior to admission:   Prior to Admission medications    Medication Sig Start Date End Date Taking?  Authorizing Provider   atorvastatin (LIPITOR) 20 MG tablet TAKE 1 TABLET DAILY 22   Ar Automatic Reconciliation   aspirin 81 MG chewable tablet Take 81 mg by mouth daily  Patient not taking: Reported on 3/6/2023    Historical Provider, MD   Cholecalciferol 50 MCG (2000) TABS Take 2,000 Units by mouth daily    Historical Provider, MD   psyllium (METAMUCIL SMOOTH TEXTURE) 58.6 % powder Take by mouth daily    Historical Provider, MD       Current medications:    Current Facility-Administered Medications   Medication Dose Route Frequency Provider Last Rate Last Admin    acetaminophen (TYLENOL) tablet 1,000 mg  1,000 mg Oral Once [facility], DO        pregabalin (LYRICA) capsule 75 mg  75 mg Oral Once [facility], DO        dexamethasone (PF) (DECADRON) injection 10 mg  10 mg IntraVENous Once [facility], DO        tranexamic acid (CYKLOKAPRON) 1,000 mg in sodium chloride 0.9 % 110 mL IVPB (mini-bag)  1,000 mg IntraVENous On Call to [address] [place] [address], DO        sodium chloride flush 0.9 % injection 5-40 mL  5-40 mL IntraVENous 2 times per day Haydee Ronquillo,         sodium chloride flush 0.9 % injection 5-40 mL  5-40 mL IntraVENous PRN Haydee Ronquillo, DO        0.9 % sodium chloride infusion   IntraVENous PRN Haydee Ronquillo,         ceFAZolin (ANCEF) 2,000 mg in sterile water 20 mL IV syringe  2,000 mg IntraVENous On Call to [facility], DO        meloxicam (MOBIC) tablet 7.5 mg  7.5 mg Oral Once Luis Navarro, DO        tamsulosin [facility]) capsule 0.4 mg  0.4 mg Oral Daily Roseline Magana,         lidocaine PF 1 % injection 1 mL  1 mL IntraDERmal Once PRN Benita Homme, APRN - CRNA        famotidine (PEPCID) tablet 20 mg  20 mg Oral Once Benita Homme, APRN - CRNA        lactated ringers IV soln infusion   IntraVENous Continuous Benita Homme, APRN - CRNA        ropivacaine (NAROPIN) 0.2% injection 0.2%  10 mL/hr Infiltration Once Benita Homme, APRN - CRNA        lidocaine PF 1 % injection 5 mL  5 mL IntraDERmal Once Benita Homme, APRN - CRNA        midazolam PF (VERSED) injection 2 mg  2 mg IntraVENous Once Benita Homme, APRN - CRNA        fentaNYL (SUBLIMAZE) injection 100 mcg  100 mcg IntraVENous Once Benita Homme, APRN - CRNA           Allergies:  No Known Allergies    Problem List:    Patient Active Problem List   Diagnosis Code    Vestibular neuronitis H81.20    Overweight (BMI 25.0-29. 9) E66.3    Allergic rhinitis J30.9    History of vitamin D deficiency Z86.39    Transient speech disturbance R47.9    Hyperlipidemia E78.5    Diverticulosis of large intestine without hemorrhage K57.30    Colon polyps K63.5    Impaired fasting glucose R73.01    Essential hypertension I10    History of diverticulitis Z87.19    Primary osteoarthritis of right knee M17.11       Past Medical History:        Diagnosis Date    Abnormal glucose     Arthritis     Calculus of kidney     Colon polyps     Diverticulitis 02/2019    Diverticulosis of colon 03/2016    Colonoscopy (3/2016) advanced left sided diverticulosis    Hyperlipidemia     Hypertension     no medications    Vertigo 07/2020       Past Surgical History:        Procedure Laterality Date    BLEPHAROPLASTY      CATARACT REMOVAL  4/2012    both eyes    COLONOSCOPY      Jan 2011, Dr. Uli Hunt, colon polyps, was going yearly    COLONOSCOPY N/A 12/15/2021    COLONOSCOPY with Polypectomies performed by Joe Keith MD at [address]  3-4-16    Dr. Ramon Chavez      [place]    VASECTOMY         Social History:    Social History     Tobacco Use    Smoking status: Former     Types: Cigarettes     Quit date: 1988     Years since quittin.2    Smokeless tobacco: Never   Substance Use Topics    Alcohol use: Yes     Alcohol/week: 1.0 standard drink     Comment: wine occasionally                                Counseling given: Not Answered      Vital Signs (Current):   Vitals:    23 1516   Weight: 163 lb (73.9 kg)   Height: 5' 6\" (1.676 m)                                              BP Readings from Last 3 Encounters:   23 130/83   23 115/72   22 128/70       NPO Status:                                                   Date of last liquid consumption: 23                             BMI:   Wt Readings from Last 3 Encounters:   23 163 lb (73.9 kg)   23 160 lb (72.6 kg)   23 162 lb 3.2 oz (73.6 kg)     Body mass index is 26.31 kg/m².     CBC:   Lab Results   Component Value Date/Time    WBC 6.2 2023 09:30 AM    RBC 4.75 2023 09:30 AM    HGB 14.8 2023 09:30 AM    HCT 43.3 2023 09:30 AM    MCV 91.2 2023 09:30 AM    RDW 13.3 2023 09:30 AM     2023 09:30 AM       CMP:   Lab Results   Component Value Date/Time     2023 09:30 AM    K 3.8 2023 09:30 AM     2023 09:30 AM    CO2 26 2023 09:30 AM    BUN 11 2023 09:30 AM    CREATININE 0.91 2023 09:30 AM    GFRAA >60 2022 09:31 AM    AGRATIO 1.3 2022 08:50 AM    LABGLOM >60 2023 09:30 AM    GLUCOSE 104 2023 09:30 AM    PROT 6.2 2023 09:30 AM    CALCIUM 9.3 2023 09:30 AM    BILITOT 1.5 2023 09:30 AM    ALKPHOS 61 2023 09:30 AM    ALKPHOS 71 2022 08:50 AM    AST 17 2023 09:30 AM    ALT 24 02/23/2023 09:30 AM       POC Tests: No results for input(s): POCGLU, POCNA, POCK, POCCL, POCBUN, POCHEMO, POCHCT in the last 72 hours. Coags:   Lab Results   Component Value Date/Time    PROTIME 14.0 02/23/2023 09:30 AM    INR 1.0 02/23/2023 09:30 AM    APTT 36.9 02/23/2023 09:30 AM       HCG (If Applicable): No results found for: PREGTESTUR, PREGSERUM, HCG, HCGQUANT     ABGs: No results found for: PHART, PO2ART, MPN8PQB, BVE1FVB, BEART, V7WSRFPW     Type & Screen (If Applicable):  No results found for: LABABO, LABRH    Drug/Infectious Status (If Applicable):  No results found for: HIV, HEPCAB    COVID-19 Screening (If Applicable): No results found for: COVID19        Anesthesia Evaluation  Patient summary reviewed  Airway: Mallampati: II  TM distance: >3 FB   Neck ROM: full  Mouth opening: > = 3 FB   Dental:          Pulmonary:Negative Pulmonary ROS and normal exam                               Cardiovascular:  Exercise tolerance: good (>4 METS),   (+) hypertension:,                   Neuro/Psych:   Negative Neuro/Psych ROS              GI/Hepatic/Renal: Neg GI/Hepatic/Renal ROS            Endo/Other: Negative Endo/Other ROS                    Abdominal:             Vascular: negative vascular ROS. Other Findings:           Anesthesia Plan      spinal and regional     ASA 2       Induction: intravenous. Anesthetic plan and risks discussed with patient. Plan discussed with CRNA.                     Tova Burgos MD   3/8/2023

## 2023-03-08 NOTE — INTERVAL H&P NOTE
Update History & Physical    The patient's History and Physical of March 7, 2023 was reviewed with the patient and I examined the patient. There was no change. The surgical site was confirmed by the patient and me.     Plan: The risks, benefits, expected outcome, and alternative to the recommended procedure have been discussed with the patient. Patient understands and wants to proceed with the procedure.     Electronically signed by Tyrone Magana DO on 3/8/2023 at 9:38 AM

## 2023-03-08 NOTE — ANESTHESIA PROCEDURE NOTES
Peripheral Block    Patient location during procedure: holding area  Reason for block: post-op pain management and at surgeon's request  Start time: 3/8/2023 9:43 AM  End time: 3/8/2023 9:50 AM  Staffing  Anesthesiologist: Karon Lopez MD  Preanesthetic Checklist  Completed: patient identified, IV checked, site marked, risks and benefits discussed, surgical/procedural consents, equipment checked, pre-op evaluation, timeout performed, anesthesia consent given, oxygen available, monitors applied/VS acknowledged and fire risk safety assessment completed and verbalized  Peripheral Block   Patient position: supine  Prep: ChloraPrep  Provider prep: mask and sterile gloves  Patient monitoring: cardiac monitor, continuous pulse ox, frequent blood pressure checks, IV access, oxygen and responsive to questions  Block type: Femoral  Adductor canal  Laterality: right  Injection technique: single-shot  Guidance: nerve stimulator and ultrasound guided  Local infiltration: lidocaine  Infiltration strength: 1 %  Local infiltration: lidocaine  Dose: 1 mL    Needle   Needle type: insulated echogenic nerve stimulator needle   Needle gauge: 20 G  Needle localization: anatomical landmarks, nerve stimulator and ultrasound guidance  Test dose: negative  Needle length: 8 cm  Assessment   Injection assessment: negative aspiration for heme, no paresthesia on injection, local visualized surrounding nerve on ultrasound and no intravascular symptoms  Paresthesia pain: none  Slow fractionated injection: yes  Hemodynamics: stable  Real-time US image taken/store: yes  Outcomes: uncomplicated    Medications Administered  ropivacaine (NAROPIN) injection 0.2% - Perineural   20 mL - 3/8/2023 9:43:00 AM

## 2023-03-08 NOTE — CARE COORDINATION
SW reviewed the Medicare Outpatient Observation Letter. The patient acknowledged understanding.         03/08/23 1619   IMM Letter   Observation Status Letter date given: 03/08/23   Observation Status Letter time given: 0400   Observation Status Letter given to Patient/Family/Significant other/Guardian/POA/by: Harley Suarez,        ADRIANA Burnett    [facility]

## 2023-03-08 NOTE — ANESTHESIA PROCEDURE NOTES
Spinal Block    End time: 3/8/2023 10:19 AM  Reason for block: primary anesthetic  Staffing  Performed: other anesthesia staff   Anesthesiologist: Miguel Mooney MD  Resident/CRNA: DEEPTHI Hamilton - CRNA  Other anesthesia staff: Anna Sauceda RN  Spinal Block  Patient position: sitting  Prep: Betadine  Patient monitoring: continuous pulse ox and frequent blood pressure checks  Approach: midline  Location: L2/L3  Provider prep: mask and sterile gloves  Local infiltration: lidocaine  Needle  Needle type:  Jihan   Needle gauge: 25 G  Needle length: 3.5 in  Kit: Medline [id number]  Expiration date: 4/30/2025  Assessment  Sensory level: T6  Swirl obtained: Yes  CSF: clear  Attempts: 1  Hemodynamics: stable  Preanesthetic Checklist  Completed: patient identified, IV checked, site marked, risks and benefits discussed, surgical/procedural consents, equipment checked, pre-op evaluation, timeout performed, anesthesia consent given, oxygen available, monitors applied/VS acknowledged, fire risk safety assessment completed and verbalized and blood product R/B/A discussed and consented

## 2023-03-08 NOTE — PLAN OF CARE
Problem: Physical Therapy - Adult  Goal: By Discharge: Performs mobility at highest level of function for planned discharge setting. See evaluation for individualized goals. Description: Physical Therapy Goals:  Initiated 3/8/2023 to be met within 7-10 days. 1.  Patient will move from supine to sit and sit to supine  in bed with modified independence. 2.  Patient will transfer from bed to chair and chair to bed with modified independence using the least restrictive device. 3.  Patient will perform sit to stand with modified independence. 4.  Patient will ambulate with modified independence for 300 feet with the least restrictive device. 5.  Patient will ascend/descend 3 stairs with B handrail(s) with modified independence. PLOF: Pt lives with his wife in a 2 story home, able to live on first. 3 ZEESHAN, indep PTA. Outcome: Progressing     PHYSICAL THERAPY EVALUATION    Patient: Omar Francois (62 y.o. male)  Date: 3/8/2023  Primary Diagnosis: Osteoarthritis of right knee, unspecified osteoarthritis type [M17.11]  Primary osteoarthritis of right knee [M17.11]  Procedure(s) (LRB):  RIGHT MEDIAL UNICOMPARTMENTAL KNEE ARTHROPLASTY, JENNIFER SARA (Right) Day of Surgery   Precautions: Fall Risk, Weight Bearing, Right Lower Extremity Weight Bearing: Weight Bearing As Tolerated     ASSESSMENT :  Patient is 81 yo male admitted to hospital for TKA and presents today POD 0 and agreeable to therapy. Patient was educated on weight bearing status, role of therapy, TE (see below), and equipment in room including role of SCDs, towel roll, and ice sleeve. Patient demonstrated indep SLR and transferred to sitting EOB for objective assessment. Patient was given demo with instruction on sit <> stand transfer and gait training. Patient transferred to standing with Lucinda and RW and ambulated 20 ft around room with Lucinda for safety. Pt had x2 minor LOB's 2/2 wooziness.  He reports feeling like both of his legs were weak and shaking- no buckling noted. At conclusion of session patient transferred to sitting in recliner/ for exercises per flow sheet and was left resting with call bell by the side,  and towel roll under ankle. Patient instructed to call for assistance if they needed to get up for any reason and denied need for further assistance. Patient demonstrates decreased strength, mobility, and endurance. Pt not cleared by PT at this time. DEFICITS/IMPAIRMENTS:    , Body Structures, Functions, Activity Limitations Requiring Skilled Therapeutic Intervention: Decreased functional mobility ; Decreased ADL status; Decreased ROM; Decreased strength;Decreased safe awareness;Decreased endurance;Decreased balance; Increased pain    Patient will benefit from skilled intervention to address the above impairments. Patient's rehabilitation potential/Therapy Prognosis: Good. Factors which may influence rehabilitation potential include:   []         None noted  []         Mental ability/status  [x]         Medical condition  [x]         Home/family situation and support systems  [x]         Safety awareness  []         Pain tolerance/management  []         Other:      PLAN :  Recommendations and Planned Interventions:   [x]           Bed Mobility Training             [x]    Neuromuscular Re-Education  [x]           Transfer Training                   []    Orthotic/Prosthetic Training  [x]           Gait Training                          []    Modalities  [x]           Therapeutic Exercises           []    Edema Management/Control  [x]           Therapeutic Activities            [x]    Family Training/Education  [x]           Patient Education  []           Other (comment):    Frequency/Duration: Patient will be followed by physical therapy to address goals, 1-2 times per day/3-5 days per week to address goals.     Further Equipment Recommendations for Discharge: none    Discharge Recommendations: Home with Home health PT    AMPA: Current research shows that an AM-PAC score of 18 (14 without stairs) or greater is associated with a discharge to the patient's home setting. Based on an AM-PAC score of 18/24 (or **/20 if omitting stairs) and their current functional mobility deficits, it is recommended that the patient have 2-3 sessions per week of Physical Therapy at d/c to increase the patient's independence. This [facility] score should be considered in conjunction with interdisciplinary team recommendations to determine the most appropriate discharge setting. Patient's social support, diagnosis, medical stability, and prior level of function should also be taken into consideration. SUBJECTIVE:   Patient stated I feel a little woozy.     OBJECTIVE DATA SUMMARY:     Past Medical History:   Diagnosis Date    Abnormal glucose     Arthritis     Calculus of kidney     Colon polyps     Diverticulitis 02/2019    Diverticulosis of colon 03/2016    Colonoscopy (3/2016) advanced left sided diverticulosis    Hyperlipidemia     Hypertension     no medications    Vertigo 07/2020     Past Surgical History:   Procedure Laterality Date    BLEPHAROPLASTY      CATARACT REMOVAL  4/2012    both eyes    COLONOSCOPY      Jan 2011, Dr. Uli Hunt, colon polyps, was going yearly    COLONOSCOPY N/A 12/15/2021    COLONOSCOPY with Polypectomies performed by Juancho Thornton MD at [facility]    COLONOSCOPY W/BIOPSY SINGLE/MULTIPLE  3-4-16    Dr. Mo Lees      [place]    VASECTOMY         Home Situation:  Social/Functional History  Lives With: Spouse  Type of Home: House  Home Layout: Able to Live on Main level with bedroom/bathroom  Home Access: Stairs to enter with rails  Entrance Stairs - Number of Steps: 3  ADL Assistance: Independent  Ambulation Assistance: Independent  Transfer Assistance: Independent  Critical Behavior:  Orientation  Orientation Level: Oriented X4       Strength:    Strength: Generally decreased, functional    Tone & Sensation:   Tone: Normal  Sensation: Impaired (numbness B feet)    Range Of Motion:  AROM: Generally decreased, functional       Functional Mobility:  Bed Mobility:     Bed Mobility Training  Bed Mobility Training: Yes  Supine to Sit: Stand-by assistance  Transfers:     Transfer Training  Transfer Training: Yes  Interventions: Verbal cues  Sit to Stand: Minimum assistance  Stand to Sit: Contact-guard assistance  Balance:         Balance  Sitting: Intact  Standing: Impaired  Standing - Static: Fair  Standing - Dynamic: Fair    Ambulation/Gait Training:       Gait  Overall Level of Assistance: Minimum assistance;Assist X1  Interventions: Safety awareness training;Verbal cues  Base of Support: Center of gravity altered  Step Length: Left shortened;Right shortened  Gait Abnormalities: Trunk sway increased  Distance (ft): 20 Feet  Assistive Device: Walker, rolling       Therapeutic Exercises/Neuromuscular Re-education:   X10 Laq's, marches, ankle pumps  Pain:  Pain level pre-treatment: 4/10   Pain level post-treatment: 4/10   Pain Intervention(s): Medication (see MAR); Rest, Ice, Repositioning  Response to intervention: Nurse notified, See doc flow    Activity Tolerance:   Activity Tolerance: Patient limited by fatigue;Patient limited by endurance  Please refer to the flowsheet for vital signs taken during this treatment. After treatment:   [x]         Patient left in no apparent distress sitting up in chair  []         Patient left in no apparent distress in bed  [x]         Call bell left within reach  [x]         Nursing notified  [x]         Caregiver present  []         Bed alarm activated  []         SCDs applied    COMMUNICATION/EDUCATION:   Patient Education  Education Given To: Patient; Family  Education Provided: Role of Therapy;Plan of Care;Home Exercise Program  Education Method: Verbal;Demonstration; Teach Back  Barriers to Learning: None  Education Outcome: Verbalized understanding    Thank you for this referral.  Karen Suero, PT  Minutes: 20      Eval Complexity: [facility] [place] AM-PAC® Basic Mobility Inpatient Short Form (6-Clicks) Version 2    How much HELP from another person does the patient currently need    (If the patient hasn't done an activity recently, how much help from another person do you think he/she would need if he/she tried?)   Total (Total A or Dep)   A Lot  (Mod to Max A)   A Little (Sup or Min A)   None (Mod I to I)   Turning from your back to your side while in a flat bed without using bedrails? [] 1 [] 2 [x] 3 [] 4   2. Moving from lying on your back to sitting on the side of a flat bed without using bedrails? [] 1 [] 2 [x] 3 [] 4   3. Moving to and from a bed to a chair (including a wheelchair)? [] 1 [] 2 [x] 3 [] 4   4. Standing up from a chair using your arms (e.g., wheelchair, or bedside chair)? [] 1 [] 2 [x] 3 [] 4   5. Walking in hospital room? [] 1 [] 2 [x] 3 [] 4   6. Climbing 3-5 steps with a railing?+   [] 1 [] 2 [x] 3 [] 4   +If stair climbing cannot be assessed, skip item #6. Sum responses from items 1-5. Current research shows that an AM-PAC score of 18 (14 without stairs) or greater is associated with a discharge to the patient's home setting. Based on an AM-PAC score of 18/24 (or **/20 if omitting stairs) and their current functional mobility deficits, it is recommended that the patient have 2-3 sessions per week of Physical Therapy at d/c to increase the patient's independence.

## 2023-03-08 NOTE — PROGRESS NOTES
Postop check    Patient seen in his hospital room. He is comfortable and is able to move his leg through range of motion from 0 to greater than 90 degrees. His pain is well controlled and his dressing is intact. He is able to work with physical therapy. He has had some difficulty urinating and bladder scan showed 347 mL of fluid. Patient feels like he will be able to pee soon. If his bladder scan shows 400 mL of fluid we will straight cath him. I will check on him in the morning and likely discharge in the morning if he has been able to pee.

## 2023-03-08 NOTE — BRIEF OP NOTE
Brief Postoperative Note      Patient: Arnel Day  YOB: 1940  MRN: [id number]    Date of Procedure: 3/8/2023    Pre-Op Diagnosis: M17.11 RIGHT KNEE DEGENERATIVE JOINT DISEASE    Post-Op Diagnosis: Same       Procedure(s):  RIGHT MEDIAL UNICOMPARTMENTAL KNEE ARTHROPLASTY, JENNIFER MAKO    Surgeon(s):  Tati Palacios DO    Assistant:  Surgical Assistant: Georgina Villafuerte    Anesthesia: Spinal    Estimated Blood Loss (mL): less than 50     Complications: None    Specimens:   * No specimens in log *    Implants:  Implant Name Type Inv.  Item Serial No.  Lot No. LRB No. Used Action   COMPONENT FEM SZ 15 L MEDIAL/RIGHT LAT UNI KNEE FOR - [id number]  COMPONENT FEM SZ 15 L MEDIAL/RIGHT LAT UNI KNEE FOR  [employer] [id number] Right 1 Implanted   BASEPLATE TIB SZ 16 L MEDIAL/RIGHT LAT UNI KNEE TI ONLAY - [id number]  BASEPLATE TIB SZ 16 L MEDIAL/RIGHT LAT UNI KNEE TI ONLAY  [employer] [id number] Right 1 Implanted   CEMENT BNE 20ML 41GM FULL DOSE PMMA W/ TOBRA M VISC RADPQ - [id number]  CEMENT BNE 20ML 41GM FULL DOSE PMMA W/ TOBRA M VISC RADPQ  [employer] [id number] Right 1 Implanted   INSERT TIB ONLAY 6 UNIV 9 MM KNEE X3 [facility] - [id number]  INSERT TIB ONLAY 6 UNIV 9 MM KNEE X3 [facility]  [employer] [id number] Right 1 Implanted         Drains: * No LDAs found *    Findings: none    Electronically signed by Tati Palacios DO on 3/8/2023 at 1:05 PM

## 2023-03-08 NOTE — OP NOTE
Operative Note      Patient: Jose Luis Bang  YOB: 1940  MRN: [id number]    Date of Procedure: 3/8/2023    Pre-Op Diagnosis: M17.11 RIGHT KNEE DEGENERATIVE JOINT DISEASE    Post-Op Diagnosis: Same       Procedure(s):  RIGHT MEDIAL UNICOMPARTMENTAL KNEE ARTHROPLASTY, JENNIFER SARA    Surgeon(s):  Luis Navarro DO    Assistant:   Surgical Assistant: Yasemin Wilcox    Anesthesia: Spinal    Estimated Blood Loss (mL): less than 50     Complications: None    Specimens:   * No specimens in log *    Implants:  Implant Name Type Inv. Item Serial No.  Lot No. LRB No. Used Action   COMPONENT FEM SZ 15 L MEDIAL/RIGHT LAT UNI KNEE FOR - [id number]  COMPONENT FEM SZ 15 L MEDIAL/RIGHT LAT UNI KNEE FOR  [employer] [id number] Right 1 Implanted   BASEPLATE TIB SZ 16 L MEDIAL/RIGHT LAT UNI KNEE TI ONLAY - [id number]  BASEPLATE TIB SZ 16 L MEDIAL/RIGHT LAT UNI KNEE TI ONLAY  [employer] [id number] Right 1 Implanted   CEMENT BNE 20ML 41GM FULL DOSE PMMA W/ TOBRA M VISC RADPQ - [id number]  CEMENT BNE 20ML 41GM FULL DOSE PMMA W/ TOBRA M VISC RADPQ  [employer] [id number] Right 1 Implanted   INSERT TIB ONLAY 6 UNIV 9 MM KNEE X3 SARA - [id number]  INSERT TIB ONLAY 6 UNIV 9 MM KNEE X3 SARA  [employer] [id number] Right 1 Implanted         Drains: * No LDAs found *      TOURNIQUET TIME:    Total Tourniquet Time Documented:  Thigh (Right) - 100 minutes  Total: Thigh (Right) - 100 minutes      INDICATIONS FOR PROCEDURE:  The patient has failed conservative management of their knee arthritis to include activity modification, oral medication, and injections. The disease has caused significant impact on their quality of life and wished to undergo surgical management understanding the risk of the surgery and the benefit to relieve pain and restore function following failure of conservative management.       PROCEDURE:  After informed consent was confirmed in the preoperative holding area, the operative extremity was marked. The patient was brought to the operating room and anesthesia was initiated. The anesthesia team maintained control of the head, neck and airway at all times. All bony prominences were padded and the non-operative limb had mechanical deep vein thrombosis (DVT) prophylaxis placed. The operative leg was prepped and draped in the usual sterile fashion. A time-out  was done to ensure the right patient, side, and procedure to be performed. The patient received weight based preoperative antibiotics and received Tranexamic Acid. An Esmarch was used to exsanguinate the limb and the tourniquet inflated. A standard midvastus approach to the knee was performed. There were significant degenerative changes in the expected area indicating a partial knee arthroplasty. The lateral compartment and lateral patella facet were unaffected, but the medial patella facet had some wear. This did not change my plan. The extra-incisional arrays for SARA were then drilled for the tibia and femur and the arrays secured. The checkpoints were then placed in the proximal tibia and distal femur. The femur and tibia were then mapped for SARA. Appropriate cut planning and balancing were then performed in flexion and extension, using the preop plan along with the [address] [place] [address]. The knee was secured in flexion and retractors placed to protect the patella tendon and the MCL. The SARA robotic arm was then used to make all cuts. The cut pieced were removed with rongeur and electrocautery. They looked appropriate. The knee was then placed in 90 degrees of flexion and the flexion gap opened with laminar . The medial meniscal remnants were removed with electrocautery and posterior femoral osteophytes removed with curved osteotome. The posterior capsule was then injected with 30mL of the joint cocktail.     Trial components were placed and final balancing was confirmed. Trials were removed. The cut surfaces were irrigated and dried and the components cemented in place with trial poly in place and the leg in full extension. Excess cement was removed. Trialing confirmed the chosen polyethylene size and it was placed. A thorough irrigation was completed and the remaining 30mL of the joint cocktail was infused in the pericapsular area. 1 gram of Vancomycin was distributed in the intra and extra-capsular space. The arthrotomy was repaired with a combination of #1 vicryl and #2 quill suture. The skin dermis was approximated with 2-0 monocryl suture followed by a running 2-0 monoderm Quill stitch in the subcuticular layer. Any pin sites were closed with 4-0 Nylon suture. Dermabond and a Mepilex were applied in deep flexion. At the end of the procedure, all counts were correct times two. An ankle to groin ACE wrap was placed. The patient was transferred to PACU in stable condition. There were no immediate complications. Post operatively the patient will be weight bearing as tolerated. The patient will attempt to walk on post-operative day 0 and will have DVT chemoprophylaxis along with early mobilization and mechanical prophylaxis while in house. The patient will remain on our postoperative pain regimen to minimize heavy narcotics. I was present and scrubbed for all key portions of the procedure.       Electronically signed by Benita Ramesh DO on 3/8/2023 at 1:05 PM

## 2023-03-08 NOTE — PROGRESS NOTES
1430 patient received from pacu alert, dressing cdi  Oob to chair by physio  Instructed ob using I.S.   Patient incontinent x1, bladder scan 347ccDrPriscila Beckford in with patien  Bedside shift report given to [facility]

## 2023-03-09 ENCOUNTER — CLINICAL DOCUMENTATION (OUTPATIENT)
Age: 83
End: 2023-03-09

## 2023-03-09 VITALS
DIASTOLIC BLOOD PRESSURE: 62 MMHG | TEMPERATURE: 97.8 F | HEART RATE: 60 BPM | HEIGHT: 66 IN | RESPIRATION RATE: 16 BRPM | BODY MASS INDEX: 26 KG/M2 | OXYGEN SATURATION: 95 % | WEIGHT: 161.8 LBS | SYSTOLIC BLOOD PRESSURE: 115 MMHG

## 2023-03-09 LAB
ANION GAP SERPL CALC-SCNC: 9 MMOL/L (ref 3–18)
BUN SERPL-MCNC: 13 MG/DL (ref 7–18)
BUN/CREAT SERPL: 12 (ref 12–20)
CALCIUM SERPL-MCNC: 9 MG/DL (ref 8.5–10.1)
CHLORIDE SERPL-SCNC: 108 MMOL/L (ref 100–111)
CO2 SERPL-SCNC: 20 MMOL/L (ref 21–32)
CREAT SERPL-MCNC: 1.1 MG/DL (ref 0.6–1.3)
GLUCOSE SERPL-MCNC: 181 MG/DL (ref 74–99)
POTASSIUM SERPL-SCNC: 3.9 MMOL/L (ref 3.5–5.5)
SODIUM SERPL-SCNC: 137 MMOL/L (ref 136–145)

## 2023-03-09 PROCEDURE — 97165 OT EVAL LOW COMPLEX 30 MIN: CPT

## 2023-03-09 PROCEDURE — 36415 COLL VENOUS BLD VENIPUNCTURE: CPT

## 2023-03-09 PROCEDURE — G0378 HOSPITAL OBSERVATION PER HR: HCPCS

## 2023-03-09 PROCEDURE — 2580000003 HC RX 258: Performed by: ORTHOPAEDIC SURGERY

## 2023-03-09 PROCEDURE — 6370000000 HC RX 637 (ALT 250 FOR IP): Performed by: ORTHOPAEDIC SURGERY

## 2023-03-09 PROCEDURE — 97116 GAIT TRAINING THERAPY: CPT

## 2023-03-09 PROCEDURE — 97535 SELF CARE MNGMENT TRAINING: CPT

## 2023-03-09 PROCEDURE — 6360000002 HC RX W HCPCS: Performed by: ORTHOPAEDIC SURGERY

## 2023-03-09 PROCEDURE — 80048 BASIC METABOLIC PNL TOTAL CA: CPT

## 2023-03-09 RX ADMIN — ACETAMINOPHEN 1000 MG: 500 TABLET ORAL at 05:34

## 2023-03-09 RX ADMIN — BISACODYL 5 MG: 5 TABLET, COATED ORAL at 09:17

## 2023-03-09 RX ADMIN — ASPIRIN 81 MG: 81 TABLET, COATED ORAL at 09:17

## 2023-03-09 RX ADMIN — KETOROLAC TROMETHAMINE 15 MG: 15 INJECTION, SOLUTION INTRAMUSCULAR; INTRAVENOUS at 03:06

## 2023-03-09 RX ADMIN — KETOROLAC TROMETHAMINE 15 MG: 15 INJECTION, SOLUTION INTRAMUSCULAR; INTRAVENOUS at 09:17

## 2023-03-09 RX ADMIN — FAMOTIDINE 20 MG: 20 TABLET ORAL at 09:17

## 2023-03-09 RX ADMIN — MELOXICAM 7.5 MG: 7.5 TABLET ORAL at 09:17

## 2023-03-09 RX ADMIN — CEFAZOLIN 2000 MG: 1 INJECTION, POWDER, FOR SOLUTION INTRAMUSCULAR; INTRAVENOUS at 01:58

## 2023-03-09 ASSESSMENT — PAIN - FUNCTIONAL ASSESSMENT
PAIN_FUNCTIONAL_ASSESSMENT: ACTIVITIES ARE NOT PREVENTED
PAIN_FUNCTIONAL_ASSESSMENT: ACTIVITIES ARE NOT PREVENTED

## 2023-03-09 ASSESSMENT — PAIN DESCRIPTION - ORIENTATION
ORIENTATION: LEFT
ORIENTATION: RIGHT

## 2023-03-09 ASSESSMENT — PAIN SCALES - GENERAL
PAINLEVEL_OUTOF10: 0
PAINLEVEL_OUTOF10: 2

## 2023-03-09 ASSESSMENT — PAIN DESCRIPTION - DESCRIPTORS
DESCRIPTORS: OTHER (COMMENT)
DESCRIPTORS: ACHING
DESCRIPTORS: ACHING

## 2023-03-09 ASSESSMENT — PAIN DESCRIPTION - LOCATION
LOCATION: INCISION;KNEE
LOCATION: KNEE
LOCATION: KNEE;INCISION

## 2023-03-09 ASSESSMENT — PAIN SCALES - WONG BAKER
WONGBAKER_NUMERICALRESPONSE: 0
WONGBAKER_NUMERICALRESPONSE: 0

## 2023-03-09 NOTE — PROGRESS NOTES
[facility]mita complaints of pain  Working with physio discharge instructions given  Discharged via wc

## 2023-03-09 NOTE — CARE COORDINATION
D/C order noted for today. Orders reviewed. No needs identified at this time. SW remains available if needed.       ADRIANA Gomez    [facility]

## 2023-03-09 NOTE — PROGRESS NOTES
Discharge teaching completed at bedside with patient. Opportunity provided for clarifying questions. All answered to patient satisfaction. IV removed ID removed and shredded.  Patient discharged via wheelchair

## 2023-03-09 NOTE — PROGRESS NOTES
OCCUPATIONAL THERAPY EVALUATION/DISCHARGE    Patient: Meng Bradshaw (4 y.o. male)  Date: 3/9/2023  Primary Diagnosis: Osteoarthritis of right knee, unspecified osteoarthritis type [M17.11]  Primary osteoarthritis of right knee [M17.11]  Procedure(s) (LRB):  RIGHT MEDIAL UNICOMPARTMENTAL KNEE ARTHROPLASTY, JENNIFER SARA (Right) 1 Day Post-Op   Precautions: Fall Risk, Weight Bearing, Right Lower Extremity Weight Bearing: Weight Bearing As Tolerated  PLOF: Pt lives with spouse, reports being Mod Ind for ADLs, spouse occasionally assists with LB dressing due to pain in R knee. ASSESSMENT AND RECOMMENDATIONS:    Pt is up in recliner, motivated to participate in OT. Based on the objective data below, pt demonstrates ability to perform UB ADLs with Mod Ind. Pt demos difficulty with LB ADLs due to decreased AROM of R knee. Pt educated on and issued long handled shoe horn and sock aid to maximize independence with LB ADLs. Following education pt was able to demo LB ADLs with set-up. Pt maneuvered to the BR with FWW and performed mult standing grooming tasks with set-up for > 10 min. Pt has all necessary DME at home and supportive spouse to assist and provide Supervision for pt as needed. Pt denies further concerns to OT at this time. Maximum therapeutic gains met at current level of care and patient will be discharged from occupational therapy at this time. Further Equipment Recommendations for Discharge: NA    Discharge Recommendations: Home with assist PRN    AMPAC: At this time and based on an AM-PAC score of 22/24, no further OT is recommended upon discharge due to (pt's family will be assisting prn during recovery). Recommend patient returns to prior setting with prior services. This AMPAC score should be considered in conjunction with interdisciplinary team recommendations to determine the most appropriate discharge setting.  Patient's social support, diagnosis, medical stability, and prior level of function should also be taken into consideration.      SUBJECTIVE:   Patient stated I feel pretty good actually    OBJECTIVE DATA SUMMARY:     Past Medical History:   Diagnosis Date    Abnormal glucose     Arthritis     Calculus of kidney     Colon polyps     Diverticulitis 02/2019    Diverticulosis of colon 03/2016    Colonoscopy (3/2016) advanced left sided diverticulosis    Hyperlipidemia     Hypertension     no medications    Vertigo 07/2020     Past Surgical History:   Procedure Laterality Date    BLEPHAROPLASTY      CATARACT REMOVAL  4/2012    both eyes    COLONOSCOPY      Jan 2011, Dr. Dioni Mcnally, colon polyps, was going yearly    COLONOSCOPY N/A 12/15/2021    COLONOSCOPY with Polypectomies performed by Bertrand Muñoz MD at [facility]OSCOPY    COLONOSCOPY W/BIOPSY SINGLE/MULTIPLE  3-4-16    Dr. Ramy Cabrales Right 3/8/2023    RIGHT MEDIAL UNICOMPARTMENTAL KNEE ARTHROPLASTY, JENNIFER SARA performed by Ailyn Brennan DO at [facility] MAIN OR    OTHER SURGICAL HISTORY      cn    VASECTOMY         Home Situation:   Social/Functional History  Lives With: Spouse  Type of Home: House  Home Layout: Able to Live on Main level with bedroom/bathroom  Home Access: Stairs to enter with rails  Entrance Stairs - Number of Steps: 3  ADL Assistance: Independent  Ambulation Assistance: Independent  Transfer Assistance: Independent  []  Right hand dominant   []  Left hand dominant    Cognitive/Behavioral Status:  Orientation  Overall Orientation Status: Within Normal Limits  Orientation Level: Oriented X4  Cognition  Overall Cognitive Status: WFL    Skin: visible skin intact  Edema: none noted    Vision/Perceptual:    Vision  Vision: Impaired  Vision Exceptions: Wears glasses at all times       Coordination: BUE  Coordination: Within functional limits     Balance:     Balance  Sitting: Intact  Standing: Intact  Standing - Static: Good  Standing - Dynamic: Good    Strength: BUE  Strength: Generally decreased, functional    Tone & Sensation: BUE  Tone: Normal  Sensation: Intact    Range of Motion: BUE  AROM: Within functional limits   Functional Mobility and Transfers for ADLs:  Transfers:   Transfer Training  Transfer Training: Yes  Overall Level of Assistance: Modified independent  Sit to Stand: Modified independent  Stand to Sit: Modified independent    ADL Assessment:   Feeding: Independent  Grooming: Independent; Modified independent   UE Bathing: Modified independent   LE Bathing: Supervision  UE Dressing: Modified independent   LE Dressing: Supervision; Adaptive equipment  Toileting: Modified independent     ADL Intervention:  Standing UB/LB ADL tasks    Pain:  Pain level pre-treatment: 0/10   Pain level post-treatment: 0/10     Activity Tolerance:   Activity Tolerance: Patient Tolerated treatment well  Please refer to the flowsheet for vital signs taken during this treatment. After treatment:   [x] Patient left in no apparent distress sitting up in chair  [] Patient left in no apparent distress in bed  [x] Call bell left within reach  [x] Nursing notified  [] Caregiver present  [] Bed alarm activated    COMMUNICATION/EDUCATION:   Patient Education  Education Given To: Patient  Education Provided: Role of Therapy;Plan of Care  Education Method: Verbal;Teach Back  Barriers to Learning: None    Thank you for this referral.  Rene Apodaca, OTR/L  Minutes: 24    Eval Complexity: [facility] AM-PAC® Daily Activity Inpatient Short Form (6-Clicks)*    How much HELP from another person does the patient currently need    (If the patient hasn't done an activity recently, how much help from another person do you think he/she would need if he/she tried?)   Total (Total A or Dep)   A Lot  (Mod to Max A)   A Little (Sup or Min A)   None (Mod I to I)   Putting on and taking off regular lower body clothing? [] 1 [] 2 [x] 3 [] 4   2.  Bathing (including washing, rinsing, drying)? [] 1 [] 2 [x] 3 [] 4   3. Toileting, which includes using toilet, bedpan or urinal?   [] 1 [] 2 [] 3 [x] 4   4. Putting on and taking off regular upper body clothing? [] 1 [] 2 [] 3 [x] 4   5. Taking care of personal grooming such as brushing teeth? [] 1 [] 2 [] 3 [x] 4   6. Eating meals? [] 1 [] 2 [] 3 [x] 4       At this time and based on an AM-PAC score of 22/24, no further OT is recommended upon discharge due to (pt's family will be assisting prn during recovery). Recommend patient returns to prior setting with prior services.

## 2023-03-09 NOTE — PROGRESS NOTES
S:   No events  Ambulated  No chest pain, shortness of breath, nausea,vomiting, fever, or chills  Tolerating PO  Voiding spontaneously  Passing gas    O:   Patient Vitals for the past 8 hrs:   Temp Pulse Resp BP   03/09/23 0333 97.1 °F (36.2 °C) 60 16 (!) 101/55          Alert, Oriented, NAD, non labored  Operative extremity:  Dressing clean, dry, intact  Extremity 2+, no swelling, sensation and motor intact throughout. No calf pain. A/P:   80 y.o. male post-op day 1 Day Post-Op status post Procedure(s):  RIGHT MEDIAL UNICOMPARTMENTAL KNEE ARTHROPLASTY, JENNIFER SARA   doing well. Postoperative protocol discussed. - WBAT, OOB as much as tolerated.   - Abx x 24hours post-op (finish AM of POD#1)  - DVT prophy: TEDs, SCDs, ASA  - Pain control: Tylenol, Nsaid (Toradol then oral), Tramadol, Oxycodone  - Labs: Acute blood loss anemia as expected  - PT/OT  - Dispo: D/C when clears physical therapy and pain controlled      Feroz Remy DO  3/9/2023  7:44 AM

## 2023-03-09 NOTE — PROGRESS NOTES
Patient is alert and oriented times three with no signs or symptoms of distress.  Dressing remains clean dry and intact an dpulses palpable

## 2023-03-09 NOTE — PROGRESS NOTES
Patient is alert and oriented times three with no signs or symptoms of distress other than not being able to urinate. Walked him to bathroom where he tried but only put out 50 cc of urine bladder scanned patient he had 341 cc in the bladder.  He has no pain will continue to monitor patient

## 2023-03-09 NOTE — NURSE NAVIGATOR
Rounded on patient s/p right partial knee replacement with Dr. Lon Ervin 03/08/2023. Patient observed to be alert and oriented x 3, sitting up in bedside chair. He denies chest pain, shortness of breath, nausea, vomiting, or calf pain. He states that pain has been well controlled throughout the night. He has been up ambulating with his rolling walker, voiding without difficulty. He denies any numbness or tingling to lower extremities. Ace wrap remains in place to right lower extremity, Dressing underneath noted a clean, dry and intact. Total knee replacement education book was provided to patient at clinic level. Encouraged patient to ambulate with walker hourly 10 minutes every hour and to ice 20 minutes , not to be placed directly on his skin. Encouraged continued use of incentive spirometry for the next few days to keep lungs clear and prevent lung complications. Reviewed postoperative showering instructions. Reminded patient that he may shower, no tubs or submersion in water. Patient verbalized understanding of all information provided. All questions answered. He has all required Dme at home and a strong support system. He has already obtained his medications from the pharmacy. He has cleared PT/OT safe for discharge and does not require home health or home physical therapy. Will follow patient postoperatively.

## 2023-03-09 NOTE — PROGRESS NOTES
Form received from [facility] for Dental Clearance via Fax at our [address] [place] [address] location. Blank form scanned into patient's chart. Form placed in forms bin at [address] [place] [address] location for completion.

## 2023-03-09 NOTE — PLAN OF CARE
Problem: Physical Therapy - Adult  Goal: By Discharge: Performs mobility at highest level of function for planned discharge setting. See evaluation for individualized goals. Description: Physical Therapy Goals:  Initiated 3/8/2023 to be met within 7-10 days. 1.  Patient will move from supine to sit and sit to supine  in bed with modified independence. 2.  Patient will transfer from bed to chair and chair to bed with modified independence using the least restrictive device. 3.  Patient will perform sit to stand with modified independence. 4.  Patient will ambulate with modified independence for 300 feet with the least restrictive device. 5.  Patient will ascend/descend 3 stairs with B handrail(s) with modified independence. PLOF: Pt lives with his wife in a 2 story home, able to live on first. 3 ZEESHAN, indep PTA. Outcome: Adequate for Discharge   PHYSICAL THERAPY TREATMENT/DISCHARGE    Patient: Memo Espinoza (88 y.o. male)  Date: 3/9/2023  Diagnosis: Osteoarthritis of right knee, unspecified osteoarthritis type [M17.11]  Primary osteoarthritis of right knee [M17.11] Primary osteoarthritis of right knee  Procedure(s) (LRB):  RIGHT MEDIAL UNICOMPARTMENTAL KNEE ARTHROPLASTY, JENNIFER SARA (Right) 1 Day Post-Op  Precautions: Fall Risk, Weight Bearing, Right Lower Extremity Weight Bearing: Weight Bearing As Tolerated    ASSESSMENT:  Pt cleared to participate in PT session, pt received sitting in recliner and agreeable to therapy session. Cynthia sit to stand to RW, ambulating x300 feet with Cynthia and appropriate gait pattern. Ascending/descending 8 steps with L handrail and reciprocal gait pattern. Pt returned to sitting in recliner. Pt positioned for comfort. Pt left with all needs met and call bell in reach. RN notified of position and participation.      Pt demonstrating mobility for discharge home with family assist and RW         PLAN:  Maximum therapeutic gains met at current level of care and patient will be discharged from physical therapy at this time. Rationale for discharge:  []     Goals Achieved  []     Plateau Reached  []     Patient not participating in therapy  []     Other:    Further Equipment Recommendations for Discharge: rolling walker    Discharge Recommendations: Home with assist PRN    AMPAC: At this time and based on an AM-PAC score of 24/24, no further PT is recommended upon discharge due to pt with appropriate mobility. Recommend patient returns to prior setting with prior services. Outpatient PT as necessary or recommended by physician. This AMPAC score should be considered in conjunction with interdisciplinary team recommendations to determine the most appropriate discharge setting. Patient's social support, diagnosis, medical stability, and prior level of function should also be taken into consideration. SUBJECTIVE:   Patient stated, \"Should I wait to do lunges? .\"    OBJECTIVE DATA SUMMARY:   Critical Behavior:  Orientation  Overall Orientation Status: Within Normal Limits  Orientation Level: Oriented X4    Functional Mobility Training:  Bed Mobility:     Transfers:  Transfer Training  Transfer Training: Yes  Overall Level of Assistance: Modified independent  Sit to Stand: Modified independent  Stand to Sit: Modified independent  Balance:  Balance  Sitting: Intact  Standing: Intact  Standing - Static: Good  Standing - Dynamic: Good     Ambulation/Gait Training:     Gait  Overall Level of Assistance: Modified independent  Distance (ft): 300 Feet  Assistive Device: Walker, rolling  Rail Use: Left  Stairs - Level of Assistance: Modified independent  Number of Stairs Trained: 8      Pain:  Pain level pre-treatment: 0/10  Pain level post-treatment: 0/10       Activity Tolerance:   Activity Tolerance: Patient tolerated treatment well  Please refer to the flowsheet for vital signs taken during this treatment.   After treatment:   [x] Patient left in no apparent distress sitting up in chair  [] Patient left in no apparent distress in bed  [x] Call bell left within reach  [x] Nursing notified  [] Caregiver present  [] Bed alarm activated  [] SCDs applied      COMMUNICATION/EDUCATION:   Patient Education  Education Given To: Patient; Family  Education Provided: Role of Therapy; Energy Conservation;Plan of Care;Transfer Training;Equipment;Home Exercise Program;Fall Prevention Strategies  Education Method: Verbal  Barriers to Learning: None  Education Outcome: Verbalized understanding      Day Harding, PT  Minutes: [place] [address] AM-PAC® Basic Mobility Inpatient Short Form (6-Clicks) Version 2    How much HELP from another person does the patient currently need    (If the patient hasn't done an activity recently, how much help from another person do you think he/she would need if he/she tried?)   Total (Total A or Dep)   A Lot  (Mod to Max A)   A Little (Sup or Min A)   None (Mod I to I)   Turning from your back to your side while in a flat bed without using bedrails? [] 1 [] 2 [] 3 [x] 4   2. Moving from lying on your back to sitting on the side of a flat bed without using bedrails? [] 1 [] 2 [] 3 [x] 4   3. Moving to and from a bed to a chair (including a wheelchair)? [] 1 [] 2 [] 3 [x] 4   4. Standing up from a chair using your arms (e.g., wheelchair, or bedside chair)? [] 1 [] 2 [] 3 [x] 4   5. Walking in hospital room? [] 1 [] 2 [] 3 [x] 4   6. Climbing 3-5 steps with a railing?+   [] 1 [] 2 [] 3 [x] 4   +If stair climbing cannot be assessed, skip item #6. Sum responses from items 1-5.

## 2023-03-10 ENCOUNTER — FOLLOWUP TELEPHONE ENCOUNTER (OUTPATIENT)
Facility: HOSPITAL | Age: 83
End: 2023-03-10

## 2023-03-10 NOTE — TELEPHONE ENCOUNTER
Call placed to patient, ID verified x 2. Patient is s/p right partial knee replacement with Dr. Kelsey Crespo, [facility] 03/08/2023. He denies chest pain, shortness of breath, nausea, vomiting, fever, chills or calf pain. He denies any numbness or tingling to right lower extremity, he denies any difficulty with bowel or bladder. Pain is stated as well controlled 2/10 taking his medication scheduled as prescribed. He is ambulating hourly and icing to assist with pain and stiffness. His dressing is described as dry and intact with a small spot of drainage noted. Overall he feels he is doing well. He will call the office if drainage becomes worse or reaches the outer area of the bandage. He has no questions or concerns at this time. He will follow up with Dr. Kelsey Crespo in two weeks or sooner if needed.

## 2023-03-15 ENCOUNTER — TELEPHONE (OUTPATIENT)
Age: 83
End: 2023-03-15

## 2023-03-17 ENCOUNTER — PATIENT MESSAGE (OUTPATIENT)
Age: 83
End: 2023-03-17

## 2023-03-20 NOTE — TELEPHONE ENCOUNTER
I spoke to the patient over the phone. He describes his knee to be warm but not hot and not painful arcs of motion. Instructed him to continue with ice elevation and his pain medicine I will see him at his first postoperative follow-up.

## 2023-03-21 ENCOUNTER — OFFICE VISIT (OUTPATIENT)
Age: 83
End: 2023-03-21
Payer: MEDICARE

## 2023-03-21 VITALS — HEIGHT: 66 IN | WEIGHT: 164 LBS | BODY MASS INDEX: 26.36 KG/M2 | TEMPERATURE: 97 F

## 2023-03-21 DIAGNOSIS — Z47.89 ORTHOPEDIC AFTERCARE: ICD-10-CM

## 2023-03-21 DIAGNOSIS — Z96.651 S/P RIGHT UNICOMPARTMENTAL KNEE REPLACEMENT: Primary | ICD-10-CM

## 2023-03-21 DIAGNOSIS — E66.3 OVERWEIGHT (BMI 25.0-29.9): ICD-10-CM

## 2023-03-21 DIAGNOSIS — Z96.651 STATUS POST UNICOMPARTMENTAL KNEE REPLACEMENT, RIGHT: ICD-10-CM

## 2023-03-21 PROBLEM — M17.11 PRIMARY OSTEOARTHRITIS OF RIGHT KNEE: Status: RESOLVED | Noted: 2023-02-23 | Resolved: 2023-03-21

## 2023-03-21 PROCEDURE — 73562 X-RAY EXAM OF KNEE 3: CPT | Performed by: ORTHOPAEDIC SURGERY

## 2023-03-21 PROCEDURE — 99024 POSTOP FOLLOW-UP VISIT: CPT | Performed by: ORTHOPAEDIC SURGERY

## 2023-03-21 RX ORDER — TRAMADOL HYDROCHLORIDE 50 MG/1
50 TABLET ORAL EVERY 6 HOURS PRN
Qty: 42 TABLET | Refills: 0 | Status: SHIPPED | OUTPATIENT
Start: 2023-03-21 | End: 2023-03-28

## 2023-03-21 NOTE — ASSESSMENT & PLAN NOTE
27-year-old male 2 weeks out from right knee medial unicompartmental knee replacement. His range of motion is from just shy of 0 degrees of extension to 120 degrees of flexion. His incision is healing well. I will get him started on formal physical therapy and see him back in 6 weeks. No x-rays will be needed at that visit.

## 2023-03-21 NOTE — PROGRESS NOTES
file   Transportation Needs: Not on file   Physical Activity: Not on file   Stress: Not on file   Social Connections: Not on file   Intimate Partner Violence: Not on file   Housing Stability: Not on file       REVIEW OF SYSTEMS:      No changes from previous review of systems unless noted. Prescription medication management discussed with patient. Electronically signed by: Claudia Bhardwaj DO    Note: This note was completed using voice recognition software.   Any typographical/name errors or mistakes are unintentional.

## 2023-04-13 ENCOUNTER — HOSPITAL ENCOUNTER (OUTPATIENT)
Facility: HOSPITAL | Age: 83
Setting detail: RECURRING SERIES
Discharge: HOME | End: 2023-04-16
Payer: MEDICARE

## 2023-04-13 PROCEDURE — 97110 THERAPEUTIC EXERCISES: CPT

## 2023-04-13 PROCEDURE — 97140 MANUAL THERAPY 1/> REGIONS: CPT

## 2023-04-13 PROCEDURE — 97530 THERAPEUTIC ACTIVITIES: CPT

## 2023-04-17 ENCOUNTER — HOSPITAL ENCOUNTER (OUTPATIENT)
Facility: HOSPITAL | Age: 83
Setting detail: RECURRING SERIES
Discharge: HOME | End: 2023-04-20
Payer: MEDICARE

## 2023-04-17 PROCEDURE — 97110 THERAPEUTIC EXERCISES: CPT

## 2023-04-17 PROCEDURE — 97530 THERAPEUTIC ACTIVITIES: CPT

## 2023-04-17 RX ORDER — PANTOPRAZOLE SODIUM 40 MG/1
40 TABLET, DELAYED RELEASE ORAL DAILY
Qty: 90 TABLET | Refills: 1 | Status: SHIPPED | OUTPATIENT
Start: 2023-04-17 | End: 2023-05-17

## 2023-04-17 NOTE — PROGRESS NOTES
PHYSICAL / OCCUPATIONAL THERAPY - DAILY TREATMENT NOTE (updated )    Patient Name: Priya Cintron    Date: 2023    : 1940  Insurance: Payor: MEDICARE / Plan: MEDICARE PART A AND B / Product Type: *No Product type* /      Patient  verified Yes     Visit #   Current / Total 4 12   Time   In / Out 12:54 1:41   Pain   In / Out 2-3 2   Subjective Functional Status/Changes: \"Today was my first day driving. \"   Changes to:  Meds, Allergies, Med Hx, Sx Hx? If yes, update Summary List no       TREATMENT AREA =  Pain in right knee [M25.561]    OBJECTIVE    Modalities Rationale:     decrease inflammation and decrease pain to improve patient's ability to progress to PLOF and address remaining functional goals. min [] Estim Unattended, type/location:                                      []  w/ice    []  w/heat    min [] Estim Attended, type/location:                                     []  w/US     []  w/ice    []  w/heat    []  TENS insruct      min []  Mechanical Traction: type/lbs                   []  pro   []  sup   []  int   []  cont    []  before manual    []  after manual    min []  Ultrasound, settings/location:      min []  Iontophoresis w/ dexamethasone, location:                                               []  take home patch       []  in clinic   10 min  unbill [x]  Ice     []  Heat    location/position: Long sit  right knee    min []  Paraffin,  details:     min []  Vasopneumatic Device, press/temp:     min []  Brogan [place] / Esta Hortencia: If using vaso (only need to measure limb vaso being performed on)      pre-treatment girth :       post-treatment girth :       measured at (landmark location) :      min []  Other:    Skin assessment post-treatment (if applicable):    []  intact    []  redness- no adverse reaction                 []redness - adverse reaction:         Therapeutic Procedures:     Tx Min Billable or 1:1 Min (if diff from Tx Min) Procedure, Rationale, Specifics   17  [id number]

## 2023-04-18 ENCOUNTER — HOSPITAL ENCOUNTER (OUTPATIENT)
Facility: HOSPITAL | Age: 83
Setting detail: SPECIMEN
Discharge: HOME | End: 2023-04-21
Payer: MEDICARE

## 2023-04-18 DIAGNOSIS — Z12.5 PROSTATE CANCER SCREENING: ICD-10-CM

## 2023-04-18 DIAGNOSIS — E55.9 VITAMIN D DEFICIENCY, UNSPECIFIED: ICD-10-CM

## 2023-04-18 DIAGNOSIS — I10 ESSENTIAL HYPERTENSION: ICD-10-CM

## 2023-04-18 DIAGNOSIS — E78.5 HYPERLIPIDEMIA, UNSPECIFIED HYPERLIPIDEMIA TYPE: ICD-10-CM

## 2023-04-18 DIAGNOSIS — R73.01 IMPAIRED FASTING GLUCOSE: ICD-10-CM

## 2023-04-18 LAB
25(OH)D3 SERPL-MCNC: 44.6 NG/ML (ref 30–100)
ALBUMIN SERPL-MCNC: 3.7 G/DL (ref 3.4–5)
ALBUMIN/GLOB SERPL: 1.4 (ref 0.8–1.7)
ALP SERPL-CCNC: 69 U/L (ref 45–117)
ALT SERPL-CCNC: 21 U/L (ref 16–61)
ANION GAP SERPL CALC-SCNC: 5 MMOL/L (ref 3–18)
AST SERPL-CCNC: 12 U/L (ref 10–38)
BASOPHILS # BLD: 0 K/UL (ref 0–0.1)
BASOPHILS NFR BLD: 1 % (ref 0–2)
BILIRUB SERPL-MCNC: 1.2 MG/DL (ref 0.2–1)
BUN SERPL-MCNC: 12 MG/DL (ref 7–18)
BUN/CREAT SERPL: 14 (ref 12–20)
CALCIUM SERPL-MCNC: 9.3 MG/DL (ref 8.5–10.1)
CHLORIDE SERPL-SCNC: 109 MMOL/L (ref 100–111)
CHOLEST SERPL-MCNC: 165 MG/DL
CO2 SERPL-SCNC: 25 MMOL/L (ref 21–32)
CREAT SERPL-MCNC: 0.83 MG/DL (ref 0.6–1.3)
DIFFERENTIAL METHOD BLD: ABNORMAL
EOSINOPHIL # BLD: 0.1 K/UL (ref 0–0.4)
EOSINOPHIL NFR BLD: 1 % (ref 0–5)
ERYTHROCYTE [DISTWIDTH] IN BLOOD BY AUTOMATED COUNT: 13.5 % (ref 11.6–14.5)
EST. AVERAGE GLUCOSE BLD GHB EST-MCNC: 103 MG/DL
GLOBULIN SER CALC-MCNC: 2.6 G/DL (ref 2–4)
GLUCOSE SERPL-MCNC: 94 MG/DL (ref 74–99)
HBA1C MFR BLD: 5.2 % (ref 4.2–5.6)
HCT VFR BLD AUTO: 43 % (ref 36–48)
HDLC SERPL-MCNC: 43 MG/DL (ref 40–60)
HDLC SERPL: 3.8 (ref 0–5)
HGB BLD-MCNC: 14.7 G/DL (ref 13–16)
IMM GRANULOCYTES # BLD AUTO: 0 K/UL (ref 0–0.04)
IMM GRANULOCYTES NFR BLD AUTO: 0 % (ref 0–0.5)
LDLC SERPL CALC-MCNC: 104.2 MG/DL (ref 0–100)
LIPID PANEL: ABNORMAL
LYMPHOCYTES # BLD: 0.9 K/UL (ref 0.9–3.6)
LYMPHOCYTES NFR BLD: 16 % (ref 21–52)
MCH RBC QN AUTO: 31.3 PG (ref 24–34)
MCHC RBC AUTO-ENTMCNC: 34.2 G/DL (ref 31–37)
MCV RBC AUTO: 91.5 FL (ref 78–100)
MONOCYTES # BLD: 0.5 K/UL (ref 0.05–1.2)
MONOCYTES NFR BLD: 9 % (ref 3–10)
NEUTS SEG # BLD: 3.9 K/UL (ref 1.8–8)
NEUTS SEG NFR BLD: 73 % (ref 40–73)
NRBC # BLD: 0 K/UL (ref 0–0.01)
NRBC BLD-RTO: 0 PER 100 WBC
PLATELET # BLD AUTO: 203 K/UL (ref 135–420)
PMV BLD AUTO: 9.5 FL (ref 9.2–11.8)
POTASSIUM SERPL-SCNC: 4.1 MMOL/L (ref 3.5–5.5)
PROT SERPL-MCNC: 6.3 G/DL (ref 6.4–8.2)
PSA SERPL-MCNC: 1 NG/ML (ref 0–4)
RBC # BLD AUTO: 4.7 M/UL (ref 4.35–5.65)
SODIUM SERPL-SCNC: 139 MMOL/L (ref 136–145)
TRIGL SERPL-MCNC: 89 MG/DL
VLDLC SERPL CALC-MCNC: 17.8 MG/DL
WBC # BLD AUTO: 5.4 K/UL (ref 4.6–13.2)

## 2023-04-18 PROCEDURE — 84153 ASSAY OF PSA TOTAL: CPT

## 2023-04-18 PROCEDURE — 36415 COLL VENOUS BLD VENIPUNCTURE: CPT

## 2023-04-18 PROCEDURE — 83036 HEMOGLOBIN GLYCOSYLATED A1C: CPT

## 2023-04-18 PROCEDURE — 82306 VITAMIN D 25 HYDROXY: CPT

## 2023-04-18 PROCEDURE — 80053 COMPREHEN METABOLIC PANEL: CPT

## 2023-04-18 PROCEDURE — 80061 LIPID PANEL: CPT

## 2023-04-18 PROCEDURE — 85025 COMPLETE CBC W/AUTO DIFF WBC: CPT

## 2023-04-20 ENCOUNTER — HOSPITAL ENCOUNTER (OUTPATIENT)
Facility: HOSPITAL | Age: 83
Setting detail: RECURRING SERIES
Discharge: HOME | End: 2023-04-23
Payer: MEDICARE

## 2023-04-20 PROCEDURE — 97530 THERAPEUTIC ACTIVITIES: CPT

## 2023-04-20 PROCEDURE — 97110 THERAPEUTIC EXERCISES: CPT

## 2023-04-20 PROCEDURE — 97140 MANUAL THERAPY 1/> REGIONS: CPT

## 2023-04-24 ENCOUNTER — HOSPITAL ENCOUNTER (OUTPATIENT)
Facility: HOSPITAL | Age: 83
Setting detail: RECURRING SERIES
Discharge: HOME | End: 2023-04-27
Payer: MEDICARE

## 2023-04-24 PROCEDURE — 97530 THERAPEUTIC ACTIVITIES: CPT

## 2023-04-24 PROCEDURE — 97110 THERAPEUTIC EXERCISES: CPT

## 2023-04-24 PROCEDURE — 97140 MANUAL THERAPY 1/> REGIONS: CPT

## 2023-04-24 NOTE — PROGRESS NOTES
length. Overall pt is progressing towards all goals. Patient will continue to benefit from skilled PT / OT services to modify and progress therapeutic interventions, analyze and address functional mobility deficits, analyze and address ROM deficits, analyze and address strength deficits, analyze and address soft tissue restrictions, analyze and cue for proper movement patterns, analyze and modify for postural abnormalities, and instruct in home and community integration to address functional deficits and attain remaining goals. Progress toward goals / Updated goals:  []  See Progress Note/Recertification    1. Patient will become proficient in their HEP and will be compliant in performing that program.     Evaluation: HEP established      CURRENT Reports HEP compliance 4/10/23  Long Term Goals: To be accomplished in 6 weeks:     1. Patient's pain level will be 0-2/10 with activity in order to improve patient's ability to perform normal ADLs. Evaluation: 2-9/10     CURRENT 3 at arrival 4/24/23  2. Patient will increase FOTO score to 69 points to indicate increased functional mobility. Evaluation: 48 points     CURRENT ongoing NA 4/10/23  3. Patient will demonstrate pain free right knee AROM 0 - 120 degrees in order to perform ADLs with greater ease. Evaluation: Right Knee AROM: - 12 - 105 deg  CURRENT: Progressing: Right Knee AROM: - 7 - 117 deg, 04/13/23  4.  Patient will increase right knee flex/ext MMT >/= 4+/5 in order to return to performing recreational activities pain free  Evaluation: Right Knee Flex/Ext MMT: 4-/5  CURRENT ongoing NA 4/10/23    PLAN  Yes  Continue plan of care  []  Upgrade activities as tolerated  []  Discharge due to :  []  Other:    Kaushal Callaway PTA    4/24/2023    11:02 AM    Future Appointments   Date Time Provider Theron Sagastume   4/25/2023  9:30 AM Matt Chacon MD [facility] AMB   4/26/2023 10:10 AM Pradip Stuart PT [facility]   5/15/2023 10:00 AM Sangeeta Arredondo

## 2023-04-25 ENCOUNTER — OFFICE VISIT (OUTPATIENT)
Age: 83
End: 2023-04-25
Payer: MEDICARE

## 2023-04-25 VITALS
BODY MASS INDEX: 26.52 KG/M2 | TEMPERATURE: 97.3 F | HEIGHT: 66 IN | OXYGEN SATURATION: 98 % | RESPIRATION RATE: 16 BRPM | SYSTOLIC BLOOD PRESSURE: 136 MMHG | WEIGHT: 165 LBS | HEART RATE: 71 BPM | DIASTOLIC BLOOD PRESSURE: 76 MMHG

## 2023-04-25 DIAGNOSIS — E66.3 OVERWEIGHT (BMI 25.0-29.9): ICD-10-CM

## 2023-04-25 DIAGNOSIS — Z00.00 MEDICARE ANNUAL WELLNESS VISIT, SUBSEQUENT: ICD-10-CM

## 2023-04-25 DIAGNOSIS — J30.9 ALLERGIC RHINITIS, UNSPECIFIED SEASONALITY, UNSPECIFIED TRIGGER: ICD-10-CM

## 2023-04-25 DIAGNOSIS — H81.20 VESTIBULAR NEURONITIS, UNSPECIFIED LATERALITY: ICD-10-CM

## 2023-04-25 DIAGNOSIS — Z71.89 ACP (ADVANCE CARE PLANNING): ICD-10-CM

## 2023-04-25 DIAGNOSIS — E78.00 PURE HYPERCHOLESTEROLEMIA: ICD-10-CM

## 2023-04-25 DIAGNOSIS — R41.3 MEMORY LOSS, SHORT TERM: Primary | ICD-10-CM

## 2023-04-25 DIAGNOSIS — I10 ESSENTIAL HYPERTENSION: ICD-10-CM

## 2023-04-25 DIAGNOSIS — R73.01 IMPAIRED FASTING GLUCOSE: ICD-10-CM

## 2023-04-25 PROCEDURE — 99211 OFF/OP EST MAY X REQ PHY/QHP: CPT | Performed by: INTERNAL MEDICINE

## 2023-04-25 RX ORDER — MECLIZINE HYDROCHLORIDE 25 MG/1
25 TABLET ORAL 3 TIMES DAILY PRN
Qty: 30 TABLET | Refills: 1 | Status: SHIPPED | OUTPATIENT
Start: 2023-04-25

## 2023-04-25 SDOH — ECONOMIC STABILITY: INCOME INSECURITY: HOW HARD IS IT FOR YOU TO PAY FOR THE VERY BASICS LIKE FOOD, HOUSING, MEDICAL CARE, AND HEATING?: NOT HARD AT ALL

## 2023-04-25 SDOH — ECONOMIC STABILITY: HOUSING INSECURITY
IN THE LAST 12 MONTHS, WAS THERE A TIME WHEN YOU DID NOT HAVE A STEADY PLACE TO SLEEP OR SLEPT IN A SHELTER (INCLUDING NOW)?: NO

## 2023-04-25 SDOH — ECONOMIC STABILITY: FOOD INSECURITY: WITHIN THE PAST 12 MONTHS, YOU WORRIED THAT YOUR FOOD WOULD RUN OUT BEFORE YOU GOT MONEY TO BUY MORE.: NEVER TRUE

## 2023-04-25 SDOH — ECONOMIC STABILITY: FOOD INSECURITY: WITHIN THE PAST 12 MONTHS, THE FOOD YOU BOUGHT JUST DIDN'T LAST AND YOU DIDN'T HAVE MONEY TO GET MORE.: NEVER TRUE

## 2023-04-25 ASSESSMENT — LIFESTYLE VARIABLES
HOW OFTEN DO YOU HAVE A DRINK CONTAINING ALCOHOL: 2-4 TIMES A MONTH
HOW MANY STANDARD DRINKS CONTAINING ALCOHOL DO YOU HAVE ON A TYPICAL DAY: 1 OR 2

## 2023-04-25 ASSESSMENT — PATIENT HEALTH QUESTIONNAIRE - PHQ9
1. LITTLE INTEREST OR PLEASURE IN DOING THINGS: 0
SUM OF ALL RESPONSES TO PHQ QUESTIONS 1-9: 0
SUM OF ALL RESPONSES TO PHQ9 QUESTIONS 1 & 2: 0
2. FEELING DOWN, DEPRESSED OR HOPELESS: 0
SUM OF ALL RESPONSES TO PHQ QUESTIONS 1-9: 0

## 2023-04-25 NOTE — PROGRESS NOTES
Alfreda Hawkins presents today for   Chief Complaint   Patient presents with    Medicare AWV                 1. \"Have you been to the ER, urgent care clinic since your last visit? Hospitalized since your last visit? \" yes    2. \"Have you seen or consulted any other health care providers outside of the [address] [place] [address] since your last visit? \" Yes     3. For patients aged 39-70: Has the patient had a colonoscopy / FIT/ Cologuard? NA - based on age      If the patient is female:    4. For patients aged 41-77: Has the patient had a mammogram within the past 2 years? NA - based on age or sex      11. For patients aged 21-65: Has the patient had a pap smear?  NA - based on age or sex
Medicare Annual Wellness Visit    Verito Score is here for Medicare AWV    Assessment & Plan   Memory loss, short term  -     RPR; Future  -     TSH; Future  -     Vitamin B12; Future  Essential hypertension  Pure hypercholesterolemia  -     Comprehensive Metabolic Panel; Future  -     Lipid Panel; Future  Impaired fasting glucose  Vestibular neuronitis, unspecified laterality  Allergic rhinitis, unspecified seasonality, unspecified trigger  Overweight (BMI 25.0-29. 9)  Medicare annual wellness visit, subsequent  ACP (advance care planning)      Recommendations for Preventive Services Due: see orders and patient instructions/AVS.  Recommended screening schedule for the next 5-10 years is provided to the patient in written form: see Patient Instructions/AVS.     Return in about 6 months (around 10/25/2023), or if symptoms worsen or fail to improve. Subjective   See office progress note for details. Patient's complete Health Risk Assessment and screening values have been reviewed and are found in Flowsheets. The following problems were reviewed today and where indicated follow up appointments were made and/or referrals ordered. Positive Risk Factor Screenings with Interventions:       Cognitive: Words recalled: 0 Words Recalled   Clock Drawing Test (CDT): Normal   Total Score: (!) 2   Total Score Interpretation: Abnormal Mini-Cog      Interventions:  Agreeable to 6-month follow-up for laboratory testing at that time.   Consider further imaging                 Safety:  Do you have any tripping hazards - loose or unsecured carpets or rugs?: (!) Yes  Do you have any tripping hazards - clutter in doorways, halls, or stairs?: (!) Yes  Do you have either shower bars, grab bars, non-slip mats or non-slip surfaces in your shower or bathtub?: (!) No  Interventions:  Patient comments: Reports aware of risks but not wishing to make changes at home                     Objective   Vitals:    04/25/23 0930 04/25/23
medications    Vertigo 2020     Past Surgical History:   Procedure Laterality Date    BLEPHAROPLASTY      CATARACT REMOVAL  2012    both eyes    COLONOSCOPY      2011, Dr. Marco Enriquez, colon polyps, was going yearly    COLONOSCOPY N/A 12/15/2021    COLONOSCOPY with Polypectomies performed by Felicia Reyes MD at [facility]    COLONOSCOPY W/BIOPSY SINGLE/MULTIPLE  3-4-16    Dr. Gerardo Aldridge Right 3/8/2023    RIGHT MEDIAL UNICOMPARTMENTAL KNEE ARTHROPLASTY, JENNIFER SARA performed by Latesha Doan DO at [facility]      [place]    VASECTOMY       Current Outpatient Medications   Medication Sig    meclizine (ANTIVERT) 25 MG tablet Take 1 tablet by mouth 3 times daily as needed for Dizziness    atorvastatin (LIPITOR) 20 MG tablet TAKE 1 TABLET DAILY    Cholecalciferol 50 MCG (2000) TABS Take 1 tablet by mouth daily    psyllium (METAMUCIL SMOOTH TEXTURE) 58.6 % powder Take by mouth daily    aspirin (ASPIRIN 81) 81 MG chewable tablet Take 1 tablet by mouth 2 times daily     No current facility-administered medications for this visit. Allergies and Intolerances:   No Known Allergies    Family History: His father  had CABG and  from an MI. His mother  at the age of 80. He has no family history of colon or prostate cancer. Family History   Problem Relation Age of Onset    Elevated Lipids Mother     Osteoarthritis Mother     Heart Disease Father      Social History:   He  reports that he quit smoking about 34 years ago. His smoking use included cigarettes. He has never used smokeless tobacco. He smoked 1 ppd for 15 years, stopping in . He is  and has one daughter, Ying Walls. He is a retired [employer]  and is self employed as an .      Social History     Substance and Sexual Activity   Alcohol Use Yes    Alcohol/week: 1.0 standard drink    Comment: wine occasionally     Immunization History:  Immunization History

## 2023-04-25 NOTE — ACP (ADVANCE CARE PLANNING)
Advance Care Planning     Advance Care Planning (ACP) Physician/NP/PA Conversation    Date of Conversation: 4/25/2023  Conducted with: Patient with Decision Making Capacity    Healthcare Decision Maker:      Primary Decision Maker: Tirso Leiva - [phone]    Secondary Decision Maker: Abdias Barnett - [phone]    Click here to complete Healthcare Decision Makers including selection of the Healthcare Decision Maker Relationship (ie \"Primary\")  Today we documented Decision Maker(s) consistent with Legal Next of Kin hierarchy. Care Preferences:    Hospitalization: \"If your health worsens and it becomes clear that your chance of recovery is unlikely, what would be your preference regarding hospitalization? \"  The patient would prefer hospitalization. Ventilation: \"If you were unable to breath on your own and your chance of recovery was unlikely, what would be your preference about the use of a ventilator (breathing machine) if it was available to you? \"  The patient would desire the use of a ventilator. Resuscitation: \"In the event your heart stopped as a result of an underlying serious health condition, would you want attempts made to restart your heart, or would you prefer a natural death? \"  Yes, attempt to resuscitate.     treatment goals, benefit/burden of treatment options, ventilation preferences, hospitalization preferences, resuscitation preferences, and end of life care preferences (vegetative state/imminent death)    Conversation Outcomes / Follow-Up Plan:  ACP in process - completing/providing documents  Reviewed DNR/DNI and patient elects Full Code (Attempt Resuscitation)    Length of Voluntary ACP Conversation in minutes:  16 minutes    Dolores Keen MD

## 2023-04-25 NOTE — PATIENT INSTRUCTIONS
glaucoma; cataracts, macular degeneration, and other eye disorders. A preventive dental visit is recommended every 6 months. Try to get at least 150 minutes of exercise per week or 10,000 steps per day on a pedometer . Order or download the FREE \"Exercise & Physical Activity: Your Everyday Guide\" from [employer]. Call 6-230.802.8870 or search [employer] online. You need 8446-0269 mg of calcium and 3194-6907 IU of vitamin D per day. It is possible to meet your calcium requirement with diet alone, but a vitamin D supplement is usually necessary to meet this goal.  When exposed to the sun, use a sunscreen that protects against both UVA and UVB radiation with an SPF of 30 or greater. Reapply every 2 to 3 hours or after sweating, drying off with a towel, or swimming. Always wear a seat belt when traveling in a car. Always wear a helmet when riding a bicycle or motorcycle.

## 2023-04-26 ENCOUNTER — HOSPITAL ENCOUNTER (OUTPATIENT)
Facility: HOSPITAL | Age: 83
Setting detail: RECURRING SERIES
Discharge: HOME | End: 2023-04-29
Payer: MEDICARE

## 2023-04-26 PROCEDURE — 97140 MANUAL THERAPY 1/> REGIONS: CPT

## 2023-04-26 PROCEDURE — 97110 THERAPEUTIC EXERCISES: CPT

## 2023-04-26 PROCEDURE — 97530 THERAPEUTIC ACTIVITIES: CPT

## 2023-05-01 ENCOUNTER — HOSPITAL ENCOUNTER (OUTPATIENT)
Facility: HOSPITAL | Age: 83
Setting detail: RECURRING SERIES
Discharge: HOME | End: 2023-05-04
Payer: MEDICARE

## 2023-05-01 PROCEDURE — 97112 NEUROMUSCULAR REEDUCATION: CPT

## 2023-05-01 PROCEDURE — 97530 THERAPEUTIC ACTIVITIES: CPT

## 2023-05-01 PROCEDURE — 97110 THERAPEUTIC EXERCISES: CPT

## 2023-05-01 NOTE — PROGRESS NOTES
PHYSICAL / OCCUPATIONAL THERAPY - DAILY TREATMENT NOTE (updated )    Patient Name: Carrie Hinton    Date: 2023    : 1940  Insurance: Payor: MEDICARE / Plan: MEDICARE PART A AND B / Product Type: *No Product type* /      Patient  verified Yes     Visit #   Current / Total 8 12   Time   In / Out 130 215   Pain   In / Out 2 1-2   Subjective Functional Status/Changes: Doing alright today. Changes to:  Meds, Allergies, Med Hx, Sx Hx? If yes, update Summary List no       TREATMENT AREA =  Pain in right knee [M25.561]    OBJECTIVE       Therapeutic Procedures: Tx Min Billable or 1:1 Min (if diff from Tx Min) Procedure, Rationale, Specifics   20 20 68015 Therapeutic Exercise (timed):  increase ROM, strength, coordination, balance, and proprioception to improve patient's ability to progress to PLOF and address remaining functional goals. (see flow sheet as applicable)     Details if applicable:       13 13 15474 Therapeutic Activity (timed):  use of dynamic activities replicating functional movements to increase ROM, strength, coordination, balance, and proprioception in order to improve patient's ability to progress to PLOF and address remaining functional goals. (see flow sheet as applicable)     Details if applicable:     12 12 35887 Neuromuscular Re-Education (timed):  improve balance, coordination, kinesthetic sense, posture, core stability and proprioception to improve patient's ability to develop conscious control of individual muscles and awareness of position of extremities in order to progress to PLOF and address remaining functional goals.  (see flow sheet as applicable)     Details if applicable:            Details if applicable:            Details if applicable:     39 45 [address] [place] [address] Way Reminder: bill using total billable min of TIMED therapeutic procedures (example: do not include dry needle or estim unattended, both untimed codes, in totals to left)  8-22 min = 1 unit; 23-37 min = 2

## 2023-05-01 NOTE — PROGRESS NOTES
[address] [place] [address] PHYSICAL THERAPY  [address] [place] [address], [address] [place] [address] 434,[facility] 300  OV:[phone] [place]:[phone]  CONTINUED PLAN OF CARE/RECERTIFICATION FOR PHYSICAL THERAPY          Patient Name: Linda Raymundo : 1940   Treatment/Medical Diagnosis: Pain in right knee [M25.561]   Onset Date: P/O 3/23/23    Referral Source: Ganesh Farnsworth DO Start of Care [facility]): 23   Prior Hospitalization: See Medical History Provider #: [id number]   Prior Level of Function: Ind/pain free performing ADL/IADLs, self care tasks and recreational participation (enjoys going to gym); Ind w/ household amb, intermittent SPC use for community amb    Comorbidities: : Per Medical Record: Arthritis, HTN, Diverticulitis, Cataract Removal       Medications: Verified on Patient Summary List   Visits from [facility]: 8 Missed Visits: 0     Progress to Goals:  Short Term Goals: To be accomplished in 2 treatments:     1. Patient will become proficient in their HEP and will be compliant in performing that program.     Evaluation: HEP established      CURRENT Reports HEP compliance 23  Long Term Goals: To be accomplished in 6 weeks:     1. Patient's pain level will be 0-2/10 with activity in order to improve patient's ability to perform normal ADLs. Evaluation: 2-9/10     CURRENT 2 23  2. Patient will increase FOTO score to 69 points to indicate increased functional mobility. Evaluation: 48 points     CURRENT ongoing NA 4/10/23  3. Patient will demonstrate pain free right knee AROM 0 - 120 degrees in order to perform ADLs with greater ease. Evaluation: Right Knee AROM: - 12 - 105 deg  CURRENT: -7 E and 130 F   23  4.  Patient will increase right knee flex/ext MMT >/= 4+/5 in order to return to performing recreational activities pain free  Evaluation: Right Knee Flex/Ext MMT: 4-/5  CURRENT right knee F  4+   E 4+  23      Key Functional Changes/Progress: improved ROM, strength,

## 2023-05-10 ENCOUNTER — HOSPITAL ENCOUNTER (OUTPATIENT)
Facility: HOSPITAL | Age: 83
Setting detail: RECURRING SERIES
Discharge: HOME | End: 2023-05-13
Payer: MEDICARE

## 2023-05-10 PROCEDURE — 97112 NEUROMUSCULAR REEDUCATION: CPT

## 2023-05-10 PROCEDURE — 97530 THERAPEUTIC ACTIVITIES: CPT

## 2023-05-10 PROCEDURE — 97110 THERAPEUTIC EXERCISES: CPT

## 2023-05-10 NOTE — PROGRESS NOTES
PHYSICAL / OCCUPATIONAL THERAPY - DAILY TREATMENT NOTE (updated )    Patient Name: Shakira Kellogg    Date: 5/10/2023    : 1940  Insurance: Payor: MEDICARE / Plan: MEDICARE PART A AND B / Product Type: *No Product type* /      Patient  verified Yes     Visit #   Current / Total 1 8   Time   In / Out   12:13P 12:56P   Pain   In / Out 2 0   Subjective Functional Status/Changes: Pt reports improvements in overall pain levels, w/ biggest limitation at this time being descending stairs. Changes to:  Meds, Allergies, Med Hx, Sx Hx? If yes, update Summary List no       TREATMENT AREA =  Pain in right knee [M25.561]    OBJECTIVE    Therapeutic Procedures: Tx Min Billable or 1:1 Min (if diff from Tx Min) Procedure, Rationale, Specifics   10 10 23550 Therapeutic Exercise (timed):  increase ROM, strength, coordination, balance, and proprioception to improve patient's ability to progress to PLOF and address remaining functional goals. (see flow sheet as applicable)     Details if applicable:     17 16 07763 Therapeutic Activity (timed):  use of dynamic activities replicating functional movements to increase ROM, strength, coordination, balance, and proprioception in order to improve patient's ability to progress to PLOF and address remaining functional goals. (see flow sheet as applicable)     Details if applicable:     16 13 03121 Neuromuscular Re-Education (timed):  improve balance, coordination, kinesthetic sense, posture, core stability and proprioception to improve patient's ability to develop conscious control of individual muscles and awareness of position of extremities in order to progress to PLOF and address remaining functional goals.  (see flow sheet as applicable)     Details if applicable:           Details if applicable:            Details if applicable:     37 39 [facility] Totals Reminder: bill using total billable min of TIMED therapeutic procedures (example: do not include dry needle or

## 2023-05-12 ENCOUNTER — HOSPITAL ENCOUNTER (OUTPATIENT)
Facility: HOSPITAL | Age: 83
Setting detail: RECURRING SERIES
Discharge: HOME | End: 2023-05-15
Payer: MEDICARE

## 2023-05-12 PROCEDURE — 97110 THERAPEUTIC EXERCISES: CPT

## 2023-05-12 PROCEDURE — 97530 THERAPEUTIC ACTIVITIES: CPT

## 2023-05-12 PROCEDURE — 97112 NEUROMUSCULAR REEDUCATION: CPT

## 2023-05-15 ENCOUNTER — OFFICE VISIT (OUTPATIENT)
Age: 83
End: 2023-05-15

## 2023-05-15 VITALS — WEIGHT: 165.2 LBS | BODY MASS INDEX: 26.55 KG/M2 | HEIGHT: 66 IN | RESPIRATION RATE: 18 BRPM

## 2023-05-15 DIAGNOSIS — Z96.651 STATUS POST UNICOMPARTMENTAL KNEE REPLACEMENT, RIGHT: ICD-10-CM

## 2023-05-15 DIAGNOSIS — M54.31 SCIATICA OF RIGHT SIDE: Primary | ICD-10-CM

## 2023-05-15 DIAGNOSIS — Z47.89 ORTHOPEDIC AFTERCARE: ICD-10-CM

## 2023-05-15 PROCEDURE — 99024 POSTOP FOLLOW-UP VISIT: CPT | Performed by: ORTHOPAEDIC SURGERY

## 2023-05-15 NOTE — PROGRESS NOTES
Patient: Arnol Perry                MRN: [id number]       SSN: [id number]  YOB: 1940        AGE: 80 y.o. SEX: male    PCP: Preet West MD  05/15/23    Chief Complaint: Post-Op Check (Post-Op Check (DOS 3/8/2023/RIGHT MEDIAL UNICOMPARTMENTAL KNEE ARTHROPLASTY, JENNIFER SARA))      HPI:  Arnol Perry is a 80 y.o. male with chief complaint of   Chief Complaint   Patient presents with    Post-Op Check     Post-Op Check (DOS 3/8/2023/RIGHT MEDIAL UNICOMPARTMENTAL KNEE ARTHROPLASTY, JENNIFER SARA)       1. Sciatica of right side  -     Ambulatory referral to Physical Therapy  2. Status post unicompartmental knee replacement, right  Assessment & Plan:  80-year-old male who is about 2 months status post right medial knee unicompartmental replacement. He is doing very well and is having no issues with his knee. He is having some tingling in his lower extremity which is exacerbated with sciatic tension signs here in the clinic. I will get him started on physical therapy for stretching of the right lower extremity. This will be in addition to his right knee exercises. I will plan on seeing him when he is 1 year out from surgery next spring with repeat x-rays of the right knee. 3. Orthopedic aftercare        AMB PAIN ASSESSMENT 5/15/2023   Location of Pain Knee   Location Modifiers Right   Severity of Pain 2   Quality of Pain -     Tobacco Use: Medium Risk    Smoking Tobacco Use: Former    Smokeless Tobacco Use: Never    Passive Exposure: Not on file           PHYSICAL EXAMINATION:  Resp 18   Ht 5' 6\" (1.676 m)   Wt 165 lb 3.2 oz (74.9 kg)   BMI 26.66 kg/m²   Body mass index is 26.66 kg/m². Wt Readings from Last 3 Encounters:   05/15/23 165 lb 3.2 oz (74.9 kg)   04/25/23 165 lb (74.8 kg)   03/21/23 164 lb (74.4 kg)       GENERAL: Alert and oriented x3, in no acute distress. HEENT: Normocephalic, atraumatic.     MSK: Right Knee Exam     Tenderness   The patient is experiencing

## 2023-05-15 NOTE — ASSESSMENT & PLAN NOTE
55-year-old male who is about 2 months status post right medial knee unicompartmental replacement. He is doing very well and is having no issues with his knee. He is having some tingling in his lower extremity which is exacerbated with sciatic tension signs here in the clinic. I will get him started on physical therapy for stretching of the right lower extremity. This will be in addition to his right knee exercises. I will plan on seeing him when he is 1 year out from surgery next spring with repeat x-rays of the right knee.

## 2023-05-17 ENCOUNTER — HOSPITAL ENCOUNTER (OUTPATIENT)
Facility: HOSPITAL | Age: 83
Setting detail: RECURRING SERIES
Discharge: HOME | End: 2023-05-20
Payer: MEDICARE

## 2023-05-17 PROCEDURE — 97530 THERAPEUTIC ACTIVITIES: CPT

## 2023-05-17 PROCEDURE — 97110 THERAPEUTIC EXERCISES: CPT

## 2023-05-17 NOTE — PROGRESS NOTES
PHYSICAL / OCCUPATIONAL THERAPY - DAILY TREATMENT NOTE (updated )    Patient Name: Rasheeda Su    Date: 2023    : 1940  Insurance: Payor: MEDICARE / Plan: MEDICARE PART A AND B / Product Type: *No Product type* /      Patient  verified Yes     Visit #   Current / Total 3 8   Time   In / Out 1:33 2:03   Pain   In / Out 3  0   Subjective Functional Status/Changes: \"Im doing good. \"   Changes to:  Meds, Allergies, Med Hx, Sx Hx? If yes, update Summary List no       TREATMENT AREA =  Pain in right knee [M25.561]    OBJECTIVE         Therapeutic Procedures: Tx Min Billable or 1:1 Min (if diff from Tx Min) Procedure, Rationale, Specifics   15  70190 Therapeutic Exercise (timed):  increase ROM, strength, coordination, balance, and proprioception to improve patient's ability to progress to PLOF and address remaining functional goals. (see flow sheet as applicable)     Details if applicable:       15  73872 Therapeutic Activity (timed):  use of dynamic activities replicating functional movements to increase ROM, strength, coordination, balance, and proprioception in order to improve patient's ability to progress to PLOF and address remaining functional goals.   (see flow sheet as applicable)     Details if applicable:            Details if applicable:            Details if applicable:            Details if applicable:     27  [facility] Totals Reminder: bill using total billable min of TIMED therapeutic procedures (example: do not include dry needle or estim unattended, both untimed codes, in totals to left)  8-22 min = 1 unit; 23-37 min = 2 units; 38-52 min = 3 units; 53-67 min = 4 units; 68-82 min = 5 units   Total Total     [x]  Patient Education billed concurrently with other procedures   [x] Review HEP    [] Progressed/Changed HEP, detail:    [] Other detail:       Objective Information/Functional Measures/Assessment  Pt responded well to each strengthening there ex with no difficulty but minimal

## 2023-05-19 ENCOUNTER — HOSPITAL ENCOUNTER (OUTPATIENT)
Facility: HOSPITAL | Age: 83
Setting detail: RECURRING SERIES
Discharge: HOME | End: 2023-05-22
Payer: MEDICARE

## 2023-05-19 PROCEDURE — 97140 MANUAL THERAPY 1/> REGIONS: CPT

## 2023-05-19 PROCEDURE — 97530 THERAPEUTIC ACTIVITIES: CPT

## 2023-05-23 ENCOUNTER — HOSPITAL ENCOUNTER (OUTPATIENT)
Facility: HOSPITAL | Age: 83
Setting detail: RECURRING SERIES
Discharge: HOME | End: 2023-05-26
Payer: MEDICARE

## 2023-05-23 PROCEDURE — 97110 THERAPEUTIC EXERCISES: CPT

## 2023-05-23 PROCEDURE — 97535 SELF CARE MNGMENT TRAINING: CPT

## 2023-05-23 NOTE — PROGRESS NOTES
PHYSICAL / OCCUPATIONAL THERAPY - DAILY TREATMENT NOTE (updated )    Patient Name: Brisa Mina    Date: 2023    : 1940  Insurance: Payor: MEDICARE / Plan: MEDICARE PART A AND B / Product Type: *No Product type* /      Patient  verified Yes     Visit #   Current / Total 5 8   Time   In / Out   11:30A 12:12P   Pain   In / Out 5 4   Subjective Functional Status/Changes: Patient reports a 50-60% improvement in current condition since beginning skilled PT services. States he began to notice a flare up in ss/sx w/ steady difficulties putting increased pressure on right LE (in which he has began to use [facility] intermittently for assistance). Initially demonstrates difficulties explaining differences in day to day activities, timeline of events and symptom description, however eventually notes performing 20 min on elliptical at gym prior to experiencing these symptoms. Also, describes pain as \"stabbing/burning\" localized to right knee w/ intermittent numbness/tingling in right lower leg\". Sought medical attn from [place] (same overseeing current [address] [place] [address]), in which he received new referral to address \"sciatica of right side\". Changes to:  Meds, Allergies, Med Hx, Sx Hx? If yes, update Summary List no       TREATMENT AREA =  Pain in right knee [M25.561]; Pain in right hip [M25.551]    OBJECTIVE    Therapeutic Procedures: Tx Min Billable or 1:1 Min (if diff from Tx Min) Procedure, Rationale, Specifics   9  88937 Therapeutic Exercise (timed):  increase ROM, strength, coordination, balance, and proprioception to improve patient's ability to progress to PLOF and address remaining functional goals.  (see flow sheet as applicable)     Details if applicable:     33   74547 Self Care/Home Management (timed):  improve patient knowledge and understanding of pain reducing techniques, positioning, posture/ergonomics, activity modification, diagnosis/prognosis, and physical therapy expectations, procedures and

## 2023-05-23 NOTE — PROGRESS NOTES
[facility] 304 THERAPY  [address] 300  GR:[phone] QF:[phone]  CONTINUED PLAN OF CARE/RECERTIFICATION FOR PHYSICAL THERAPY          Patient Name: Marianna Friend : 1940   Treatment/Medical Diagnosis: Pain in right knee [M25.561]; Pain in right hip [M25.551]   Onset Date: Post Op: 2023; Updated MD Referral Date: 05/15/2023    Referral Source: Marshall Slaughter DO Start of Care [facility]): 23   Prior Hospitalization: See Medical History Provider #: [id number]   Prior Level of Function: Ind/pain free performing ADL/IADLs, self care tasks and recreational participation (enjoys going to gym); Ind w/ household amb, intermittent SPC use for community amb    Comorbidities: Per Medical Record: Arthritis, HTN, Diverticulitis, Cataract Removal    Medications: Verified on Patient Summary List   [facility]: 13 Missed Visits: 0     Progress to Goals:  1. Patient's pain level will be 0-2/10 with activity in order to improve patient's ability to perform normal ADLs. PN 2 23  Current: 2-5/10 pain, 23     2. Patient will increase FOTO score to 69 points to indicate increased functional mobility. PN: 67 23  Current: ongoing, will formally reassess at next PN, 23     3. Patient will demonstrate pain free right knee AROM 0 - 130 degrees in order to perform ADLs with greater ease. PN  -7 E and 130 F   23  Current: -5 - 122 deg, 23      4.  Patient will increase right knee flex/ext MMT 5/5  in order to return to performing recreational activities pain free  PN  right knee F  4+   E 4+  23  Current: Right Knee Flex: 4/5, Ext: 4+/5, 23      Objective Information/Functional Measures:  Lumbar Screen: (-); unremarkable for symptom reproduction   FABIR/FADIR: (-)  Palpation: mod TTP w/ tissue restrictions at prox/distal hamstring   90/90 HS: (+) for tightness  MMT: right hip flex: 4/5; left hip

## 2023-05-25 ENCOUNTER — HOSPITAL ENCOUNTER (OUTPATIENT)
Facility: HOSPITAL | Age: 83
Setting detail: RECURRING SERIES
Discharge: HOME | End: 2023-05-28
Payer: MEDICARE

## 2023-05-25 PROCEDURE — 97110 THERAPEUTIC EXERCISES: CPT

## 2023-05-25 PROCEDURE — 97530 THERAPEUTIC ACTIVITIES: CPT

## 2023-05-26 ENCOUNTER — TELEPHONE (OUTPATIENT)
Age: 83
End: 2023-05-26

## 2023-05-30 ENCOUNTER — OFFICE VISIT (OUTPATIENT)
Age: 83
End: 2023-05-30
Payer: MEDICARE

## 2023-05-30 ENCOUNTER — HOSPITAL ENCOUNTER (OUTPATIENT)
Facility: HOSPITAL | Age: 83
Setting detail: RECURRING SERIES
Discharge: HOME | End: 2023-06-02
Payer: MEDICARE

## 2023-05-30 VITALS — WEIGHT: 165.4 LBS | BODY MASS INDEX: 26.58 KG/M2 | HEIGHT: 66 IN

## 2023-05-30 DIAGNOSIS — Z47.89 ORTHOPEDIC AFTERCARE: ICD-10-CM

## 2023-05-30 DIAGNOSIS — Z96.651 STATUS POST UNICOMPARTMENTAL KNEE REPLACEMENT, RIGHT: Primary | ICD-10-CM

## 2023-05-30 PROCEDURE — 99024 POSTOP FOLLOW-UP VISIT: CPT | Performed by: ORTHOPAEDIC SURGERY

## 2023-05-30 PROCEDURE — 73562 X-RAY EXAM OF KNEE 3: CPT | Performed by: ORTHOPAEDIC SURGERY

## 2023-05-30 PROCEDURE — 97110 THERAPEUTIC EXERCISES: CPT

## 2023-05-30 PROCEDURE — 97530 THERAPEUTIC ACTIVITIES: CPT

## 2023-05-30 NOTE — ASSESSMENT & PLAN NOTE
80-year-old male with 2-1/2 months out from a medial unicompartmental knee replacement. He had a particularly hard in physical therapy and workout about 2 weeks ago and ever since he has had a pain on the medial aspect of his knee. On physical exam there is a little bit of crepitus in this area just medial to the patellar tendon at the level of the joint line. X-rays show no signs of subsidence or fracture. We will try Voltaren gel and lidocaine patches. He does report that this only bothers him during the day and he sleeps without a problem with no pain. I will see him again in 4 weeks and if the pain has not gone away and we will get another set of x-rays.

## 2023-05-30 NOTE — PROGRESS NOTES
Patient: Jonathan Lorenzana                MRN: [id number]       SSN: [id number]  YOB: 1940        AGE: 80 y.o. SEX: male    PCP: Rea Estrella MD  05/30/23    Chief Complaint: Knee Pain and Post-Op Check (Post-Op Check (DOS 3/8/2023/RIGHT MEDIAL UNICOMPARTMENTAL KNEE ARTHROPLASTY, JENNIFER SARA)//)      HPI:  Jonathan Lorenzana is a 80 y.o. male with chief complaint of   Chief Complaint   Patient presents with    Knee Pain    Post-Op Check     Post-Op Check (DOS 3/8/2023/RIGHT MEDIAL UNICOMPARTMENTAL KNEE ARTHROPLASTY, JENNIFER SARA)           1. Status post unicompartmental knee replacement, right  Assessment & Plan:  80-year-old male with 2-1/2 months out from a medial unicompartmental knee replacement. He had a particularly hard in physical therapy and workout about 2 weeks ago and ever since he has had a pain on the medial aspect of his knee. On physical exam there is a little bit of crepitus in this area just medial to the patellar tendon at the level of the joint line. X-rays show no signs of subsidence or fracture. We will try Voltaren gel and lidocaine patches. He does report that this only bothers him during the day and he sleeps without a problem with no pain. I will see him again in 4 weeks and if the pain has not gone away and we will get another set of x-rays. Orders:  -     AMB POC X-RAY KNEE 3 VIEW  2. Orthopedic aftercare        AMB PAIN ASSESSMENT 5/15/2023   Location of Pain Knee   Location Modifiers Right   Severity of Pain 2   Quality of Pain -     Tobacco Use: Medium Risk    Smoking Tobacco Use: Former    Smokeless Tobacco Use: Never    Passive Exposure: Not on file           PHYSICAL EXAMINATION:  Ht 5' 6\" (1.676 m)   Wt 165 lb 6.4 oz (75 kg)   BMI 26.70 kg/m²   Body mass index is 26.7 kg/m².   Wt Readings from Last 3 Encounters:   05/30/23 165 lb 6.4 oz (75 kg)   05/15/23 165 lb 3.2 oz (74.9 kg)   04/25/23 165 lb (74.8 kg)       GENERAL: Alert and oriented

## 2023-05-30 NOTE — PROGRESS NOTES
PHYSICAL / OCCUPATIONAL THERAPY - DAILY TREATMENT NOTE (updated )    Patient Name: Suman Martínez    Date: 2023    : 1940  Insurance: Payor: MEDICARE / Plan: MEDICARE PART A AND B / Product Type: *No Product type* /      Patient  verified Yes     Visit #   Current / Total 2 16   Time   In / Out   11:30A 12:11P   Pain   In / Out 4 3   Subjective Functional Status/Changes: Patient reports some improvements in symptom presentation following previous session. Continues to have difficulty bearing weight through right LE initially w/ standing following being seated for prolonged duration. MD follow up scheduled for later today. Changes to:  Meds, Allergies, Med Hx, Sx Hx? If yes, update Summary List no       TREATMENT AREA =  Pain in right knee [M25.561]  Pain in right hip [M25.551]     OBJECTIVE    Therapeutic Procedures: Tx Min Billable or 1:1 Min (if diff from Tx Min) Procedure, Rationale, Specifics   30  60838 Therapeutic Exercise (timed):  increase ROM, strength, coordination, balance, and proprioception to improve patient's ability to progress to PLOF and address remaining functional goals. (see flow sheet as applicable)     Details if applicable:     11   30725 Therapeutic Activity (timed):  use of dynamic activities replicating functional movements to increase ROM, strength, coordination, balance, and proprioception in order to improve patient's ability to progress to PLOF and address remaining functional goals.   (see flow sheet as applicable)       Details if applicable:           Details if applicable:     39  [facility] Totals Reminder: bill using total billable min of TIMED therapeutic procedures (example: do not include dry needle or estim unattended, both untimed codes, in totals to left)  8-22 min = 1 unit; 23-37 min = 2 units; 38-52 min = 3 units; 53-67 min = 4 units; 68-82 min = 5 units   Total Total     [x]  Patient Education billed concurrently with other procedures   [x] Review

## 2023-06-01 ENCOUNTER — HOSPITAL ENCOUNTER (OUTPATIENT)
Facility: HOSPITAL | Age: 83
Setting detail: RECURRING SERIES
Discharge: HOME | End: 2023-06-04
Payer: MEDICARE

## 2023-06-01 PROCEDURE — 97530 THERAPEUTIC ACTIVITIES: CPT

## 2023-06-01 PROCEDURE — 97110 THERAPEUTIC EXERCISES: CPT

## 2023-09-25 ENCOUNTER — HOSPITAL ENCOUNTER (OUTPATIENT)
Facility: HOSPITAL | Age: 83
End: 2023-09-25
Payer: MEDICARE

## 2023-09-25 ENCOUNTER — HOSPITAL ENCOUNTER (OUTPATIENT)
Facility: HOSPITAL | Age: 83
Discharge: HOME | End: 2023-09-28
Payer: MEDICARE

## 2023-09-25 DIAGNOSIS — C44.42 SQUAMOUS CELL CANCER OF SCALP AND SKIN OF NECK: ICD-10-CM

## 2023-09-25 LAB
ANION GAP SERPL CALC-SCNC: 3 MMOL/L (ref 3–18)
BUN SERPL-MCNC: 12 MG/DL (ref 7–18)
BUN/CREAT SERPL: 13 (ref 12–20)
CALCIUM SERPL-MCNC: 9 MG/DL (ref 8.5–10.1)
CHLORIDE SERPL-SCNC: 109 MMOL/L (ref 100–111)
CO2 SERPL-SCNC: 29 MMOL/L (ref 21–32)
CREAT SERPL-MCNC: 0.9 MG/DL (ref 0.6–1.3)
ERYTHROCYTE [DISTWIDTH] IN BLOOD BY AUTOMATED COUNT: 13.5 % (ref 11.6–14.5)
GLUCOSE SERPL-MCNC: 73 MG/DL (ref 74–99)
HCT VFR BLD AUTO: 45.3 % (ref 36–48)
HGB BLD-MCNC: 15.3 G/DL (ref 13–16)
MCH RBC QN AUTO: 31.3 PG (ref 24–34)
MCHC RBC AUTO-ENTMCNC: 33.8 G/DL (ref 31–37)
MCV RBC AUTO: 92.6 FL (ref 78–100)
NRBC # BLD: 0 K/UL (ref 0–0.01)
NRBC BLD-RTO: 0 PER 100 WBC
PLATELET # BLD AUTO: 198 K/UL (ref 135–420)
PMV BLD AUTO: 9.6 FL (ref 9.2–11.8)
POTASSIUM SERPL-SCNC: 3.7 MMOL/L (ref 3.5–5.5)
RBC # BLD AUTO: 4.89 M/UL (ref 4.35–5.65)
SODIUM SERPL-SCNC: 141 MMOL/L (ref 136–145)
WBC # BLD AUTO: 5.1 K/UL (ref 4.6–13.2)

## 2023-09-25 PROCEDURE — 36415 COLL VENOUS BLD VENIPUNCTURE: CPT

## 2023-09-25 PROCEDURE — 80048 BASIC METABOLIC PNL TOTAL CA: CPT

## 2023-09-25 PROCEDURE — 85027 COMPLETE CBC AUTOMATED: CPT

## 2023-09-25 PROCEDURE — 71046 X-RAY EXAM CHEST 2 VIEWS: CPT

## 2023-09-26 ENCOUNTER — TRANSCRIBE ORDERS (OUTPATIENT)
Facility: HOSPITAL | Age: 83
End: 2023-09-26

## 2023-09-26 ENCOUNTER — HOSPITAL ENCOUNTER (OUTPATIENT)
Facility: HOSPITAL | Age: 83
Discharge: HOME | End: 2023-09-29
Payer: MEDICARE

## 2023-09-26 DIAGNOSIS — C44.42 SQUAMOUS CELL CARCINOMA, SCALP/NECK: ICD-10-CM

## 2023-09-26 DIAGNOSIS — C44.42 SQUAMOUS CELL CARCINOMA, SCALP/NECK: Primary | ICD-10-CM

## 2023-09-26 LAB
EKG ATRIAL RATE: 57 BPM
EKG DIAGNOSIS: NORMAL
EKG P AXIS: 65 DEGREES
EKG P-R INTERVAL: 180 MS
EKG Q-T INTERVAL: 456 MS
EKG QRS DURATION: 86 MS
EKG QTC CALCULATION (BAZETT): 443 MS
EKG R AXIS: 27 DEGREES
EKG T AXIS: 41 DEGREES
EKG VENTRICULAR RATE: 57 BPM

## 2023-09-26 PROCEDURE — 93005 ELECTROCARDIOGRAM TRACING: CPT

## 2023-09-26 PROCEDURE — 93010 ELECTROCARDIOGRAM REPORT: CPT | Performed by: INTERNAL MEDICINE

## 2023-10-05 ENCOUNTER — TELEPHONE (OUTPATIENT)
Age: 83
End: 2023-10-05

## 2023-10-05 NOTE — TELEPHONE ENCOUNTER
Patient's wife called and states that patient tested positive for covid. His symptoms started last night and he has a headache, congestion, a temp of around 99.8, and a little trouble breathing last night due to the congestion. No other symptoms to report at this time. She is calling to see if Dr Emily Bender can prescribe something for him? Pharmacy: [facility] on [address]    They can be reached at [phone] or [phone]. Please advise, thank you.

## 2023-10-05 NOTE — TELEPHONE ENCOUNTER
Called and spoke with patient's wife. Reports onset of low-grade fever, nasal congestion, postnasal drainage, and cough and tested positive last night and this morning for COVID-19 by home rapid antigen testing. Denies any dyspnea today. Has completed all Pfizer vaccinations including the bivalent booster dose. Will treat with Paxlovid and advised to hold atorvastatin during treatment. Discussed possible side effects including metallic taste and GI upset. Patient with normal renal function with last creatinine 0.9/eGFR >60. Prescription sent to [facility].

## 2023-11-16 RX ORDER — ATORVASTATIN CALCIUM 20 MG/1
TABLET, FILM COATED ORAL
Qty: 90 TABLET | Refills: 3 | Status: SHIPPED | OUTPATIENT
Start: 2023-11-16

## 2023-11-16 NOTE — TELEPHONE ENCOUNTER
PCP: Kierra Johns MD    LAST REFILL PER CHART:  atorvastatin (LIPITOR) 20 mg tablet 90 Tablet 3 11/21/2022    Sig: TAKE 1 TABLET DAILY   Sent to pharmacy as: atorvastatin 20 mg tablet (LIPITOR)   E-Prescribing Status: Receipt confirmed by pharmacy (11/21/2022  2:07 AM EST)    Future Appointments   Date Time Provider [address]  [address]   11/21/2023  9:45 AM [facility] LAB VISIT [facility] BS AMB   11/28/2023  1:30 PM Kierra Johns MD [facility] BS AMB

## 2023-11-21 ENCOUNTER — HOSPITAL ENCOUNTER (OUTPATIENT)
Facility: HOSPITAL | Age: 83
Setting detail: SPECIMEN
Discharge: HOME | End: 2023-11-24
Payer: MEDICARE

## 2023-11-21 DIAGNOSIS — E78.00 PURE HYPERCHOLESTEROLEMIA: ICD-10-CM

## 2023-11-21 DIAGNOSIS — R41.3 MEMORY LOSS, SHORT TERM: ICD-10-CM

## 2023-11-21 LAB
ALBUMIN SERPL-MCNC: 3.7 G/DL (ref 3.4–5)
ALBUMIN/GLOB SERPL: 1.4 (ref 0.8–1.7)
ALP SERPL-CCNC: 68 U/L (ref 45–117)
ALT SERPL-CCNC: 24 U/L (ref 16–61)
ANION GAP SERPL CALC-SCNC: 3 MMOL/L (ref 3–18)
AST SERPL-CCNC: 16 U/L (ref 10–38)
BILIRUB SERPL-MCNC: 1.2 MG/DL (ref 0.2–1)
BUN SERPL-MCNC: 10 MG/DL (ref 7–18)
BUN/CREAT SERPL: 12 (ref 12–20)
CALCIUM SERPL-MCNC: 9.3 MG/DL (ref 8.5–10.1)
CHLORIDE SERPL-SCNC: 111 MMOL/L (ref 100–111)
CHOLEST SERPL-MCNC: 160 MG/DL
CO2 SERPL-SCNC: 29 MMOL/L (ref 21–32)
CREAT SERPL-MCNC: 0.83 MG/DL (ref 0.6–1.3)
GLOBULIN SER CALC-MCNC: 2.6 G/DL (ref 2–4)
GLUCOSE SERPL-MCNC: 97 MG/DL (ref 74–99)
HDLC SERPL-MCNC: 43 MG/DL (ref 40–60)
HDLC SERPL: 3.7 (ref 0–5)
LDLC SERPL CALC-MCNC: 98.6 MG/DL (ref 0–100)
LIPID PANEL: NORMAL
POTASSIUM SERPL-SCNC: 4.5 MMOL/L (ref 3.5–5.5)
PROT SERPL-MCNC: 6.3 G/DL (ref 6.4–8.2)
SODIUM SERPL-SCNC: 143 MMOL/L (ref 136–145)
TRIGL SERPL-MCNC: 92 MG/DL
TSH SERPL DL<=0.05 MIU/L-ACNC: 1.52 UIU/ML (ref 0.36–3.74)
VIT B12 SERPL-MCNC: 760 PG/ML (ref 211–911)
VLDLC SERPL CALC-MCNC: 18.4 MG/DL

## 2023-11-21 PROCEDURE — 80053 COMPREHEN METABOLIC PANEL: CPT

## 2023-11-21 PROCEDURE — 86592 SYPHILIS TEST NON-TREP QUAL: CPT

## 2023-11-21 PROCEDURE — 36415 COLL VENOUS BLD VENIPUNCTURE: CPT

## 2023-11-21 PROCEDURE — 84443 ASSAY THYROID STIM HORMONE: CPT

## 2023-11-21 PROCEDURE — 80061 LIPID PANEL: CPT

## 2023-11-21 PROCEDURE — 82607 VITAMIN B-12: CPT

## 2023-11-22 LAB — RPR SER QL: NONREACTIVE

## 2023-11-28 ENCOUNTER — OFFICE VISIT (OUTPATIENT)
Age: 83
End: 2023-11-28
Payer: MEDICARE

## 2023-11-28 VITALS
BODY MASS INDEX: 25.55 KG/M2 | RESPIRATION RATE: 16 BRPM | OXYGEN SATURATION: 98 % | DIASTOLIC BLOOD PRESSURE: 80 MMHG | SYSTOLIC BLOOD PRESSURE: 138 MMHG | HEIGHT: 66 IN | TEMPERATURE: 98.3 F | HEART RATE: 62 BPM | WEIGHT: 159 LBS

## 2023-11-28 DIAGNOSIS — C44.329 SQUAMOUS CELL CARCINOMA OF FOREHEAD: ICD-10-CM

## 2023-11-28 DIAGNOSIS — E78.00 PURE HYPERCHOLESTEROLEMIA: ICD-10-CM

## 2023-11-28 DIAGNOSIS — M17.0 PRIMARY OSTEOARTHRITIS OF BOTH KNEES: ICD-10-CM

## 2023-11-28 DIAGNOSIS — I10 ESSENTIAL HYPERTENSION: ICD-10-CM

## 2023-11-28 DIAGNOSIS — R41.3 MEMORY LOSS: Primary | ICD-10-CM

## 2023-11-28 DIAGNOSIS — R73.01 IMPAIRED FASTING GLUCOSE: ICD-10-CM

## 2023-11-28 DIAGNOSIS — Z86.16 HISTORY OF 2019 NOVEL CORONAVIRUS DISEASE (COVID-19): ICD-10-CM

## 2023-11-28 DIAGNOSIS — Z12.5 PROSTATE CANCER SCREENING: ICD-10-CM

## 2023-11-28 DIAGNOSIS — Z96.651 STATUS POST UNICOMPARTMENTAL KNEE REPLACEMENT, RIGHT: ICD-10-CM

## 2023-11-28 PROCEDURE — 1123F ACP DISCUSS/DSCN MKR DOCD: CPT | Performed by: INTERNAL MEDICINE

## 2023-11-28 PROCEDURE — 1036F TOBACCO NON-USER: CPT | Performed by: INTERNAL MEDICINE

## 2023-11-28 PROCEDURE — 99215 OFFICE O/P EST HI 40 MIN: CPT | Performed by: INTERNAL MEDICINE

## 2023-11-28 PROCEDURE — 3074F SYST BP LT 130 MM HG: CPT | Performed by: INTERNAL MEDICINE

## 2023-11-28 PROCEDURE — G8427 DOCREV CUR MEDS BY ELIG CLIN: HCPCS | Performed by: INTERNAL MEDICINE

## 2023-11-28 PROCEDURE — G8484 FLU IMMUNIZE NO ADMIN: HCPCS | Performed by: INTERNAL MEDICINE

## 2023-11-28 PROCEDURE — G8417 CALC BMI ABV UP PARAM F/U: HCPCS | Performed by: INTERNAL MEDICINE

## 2023-11-28 PROCEDURE — 3078F DIAST BP <80 MM HG: CPT | Performed by: INTERNAL MEDICINE

## 2023-11-28 RX ORDER — SILDENAFIL 50 MG/1
50 TABLET, FILM COATED ORAL PRN
Qty: 5 TABLET | Refills: 5 | Status: SHIPPED | OUTPATIENT
Start: 2023-11-28

## 2023-11-28 RX ORDER — DOCUSATE SODIUM 100 MG/1
100 CAPSULE, LIQUID FILLED ORAL DAILY
COMMUNITY

## 2023-11-28 SDOH — ECONOMIC STABILITY: FOOD INSECURITY: WITHIN THE PAST 12 MONTHS, YOU WORRIED THAT YOUR FOOD WOULD RUN OUT BEFORE YOU GOT MONEY TO BUY MORE.: NEVER TRUE

## 2023-11-28 SDOH — ECONOMIC STABILITY: FOOD INSECURITY: WITHIN THE PAST 12 MONTHS, THE FOOD YOU BOUGHT JUST DIDN'T LAST AND YOU DIDN'T HAVE MONEY TO GET MORE.: NEVER TRUE

## 2023-11-28 SDOH — ECONOMIC STABILITY: INCOME INSECURITY: HOW HARD IS IT FOR YOU TO PAY FOR THE VERY BASICS LIKE FOOD, HOUSING, MEDICAL CARE, AND HEATING?: NOT HARD AT ALL

## 2023-11-28 ASSESSMENT — PATIENT HEALTH QUESTIONNAIRE - PHQ9
SUM OF ALL RESPONSES TO PHQ9 QUESTIONS 1 & 2: 0
SUM OF ALL RESPONSES TO PHQ QUESTIONS 1-9: 0
2. FEELING DOWN, DEPRESSED OR HOPELESS: 0
1. LITTLE INTEREST OR PLEASURE IN DOING THINGS: 0

## 2023-11-28 NOTE — PROGRESS NOTES
Candy Packer presents today for   Chief Complaint   Patient presents with    6 Month Follow-Up       1. \"Have you been to the ER, urgent care clinic since your last visit? Hospitalized since your last visit? \" no    2. \"Have you seen or consulted any other health care providers outside of the [address] [place] [address] since your last visit? \" no     3. For patients aged 43-73: Has the patient had a colonoscopy / FIT/ Cologuard? NA - based on age      If the patient is female:    4. For patients aged 43-66: Has the patient had a mammogram within the past 2 years? NA - based on age or sex      11. For patients aged 21-65: Has the patient had a pap smear?  NA - based on age or sex
followed by Mohs procedure by Dr. Cosmo Rodriges in order to completely excise. Given the extent of the incision, required left forehead reconstructive surgery by Dr. Roberto Carlos Raphael on 10/25/2023 with adjacent tissue transfer and full-thickness skin graft. Appearing well-healed today. Now planning to be followed by Dr. She Luo with next visit on 11/30/2023.  6. History of vertigo. Reported worsening tinnintus and vertigo in 7/2020 suggestive of vestibular neuritis. Treated with meclizine with improvement. Reported recurrent episode in 3/2021 and responded to meclizine over several days. Underwent contrast brain MRI (5/5/2021) with negative IAC noted. Advised to continue meclizine as needed. Not reporting any increase in symptoms today. 7. Multiple adenomatous polyps. Underwent screening colonoscopy in 12/2021 with removal of 7 sessile polyps with pathology showing all to be tubular adenomas. Repeat colonoscopy recommended for 3 years (due 12/2024). 8. History of diverticulosis. Known extensive left sided diverticulosis on prior colonoscopy. Has not had recurrent episode of diverticulitis in many years. Continue high fiber diet. 9. Right knee osteoarthritis s/p unicompartmental arthroplasty. Onset of right knee pain following a mechanical fall in 10/2021. Did not improve with conservative measures. Right knee x-ray (11/2/2022) mild-moderate medial compartment joint space narrowing with subchondral sclerosis and marginal osteophyte formation consistent with osteoarthritis. Right knee CT (12/20/2022) moderately advanced right knee osteoarthrosis with tricompartmental osteophytosis and medial compartment subchondral sclerosis/subchondral cysts with vacuum phenomenon. Completed medial unicompartmental arthroplasty on 3/8/2023 with Dr. Lars Cunningham with overall improvement. Completed physical therapy and not noting any pain currently. Overall improved. 10. History of COVID-19.   Tested positive on 10/5/2023

## 2023-12-02 PROBLEM — R41.3 MEMORY LOSS: Status: ACTIVE | Noted: 2023-12-02

## 2023-12-02 PROBLEM — M17.10 PRIMARY OSTEOARTHRITIS OF KNEE: Status: ACTIVE | Noted: 2023-02-23

## 2023-12-02 PROBLEM — C44.329 SQUAMOUS CELL CARCINOMA OF FOREHEAD: Status: ACTIVE | Noted: 2023-12-02

## 2023-12-02 PROBLEM — Z86.16 HISTORY OF 2019 NOVEL CORONAVIRUS DISEASE (COVID-19): Status: ACTIVE | Noted: 2023-12-02

## 2024-01-10 NOTE — TELEPHONE ENCOUNTER
Incoming call- patient was transferred from [facility]. Patient wanted to schedule an appointment but Is having the following symptoms- he expresses intermittent episodes of feeling winded (SOB). He also stated that he is having increased episodes of indigestion and gas. He states the indigestion and gas usually goes away after he eats food. He stated that this has been on going for 2-3 weeks. He denies any chest pain or heart palpitations. He denies any fevers, cough or other symptoms. He states he has continued to workout without any difficulty. He would like to schedule an appointment for evaluation but our office has no openings for today. I explained to patient that the safest and fasted option for results would be for him to go to the ED. Patient stated that he felt he was ok to wait and be seen in the office and that he didn't want to be stuck in the ED all day. I spoke with BM to get his input as to if its ok to schedule the patient or if patient should present to the ED. The office has no availabiltiy today so patient has been scheduled to see Taurus Gonsalez on 2/1/19. DBM stated to explain to the patient if has any changes or increased SOB/feeling winded before appointment then to present to the ED. Patient verbalized understanding. No additional questions at this time.
EMT/paramedic

## 2024-01-11 ENCOUNTER — PATIENT MESSAGE (OUTPATIENT)
Age: 84
End: 2024-01-11

## 2024-01-11 ENCOUNTER — TELEPHONE (OUTPATIENT)
Age: 84
End: 2024-01-11

## 2024-01-11 NOTE — TELEPHONE ENCOUNTER
----- Message from Tim Stone sent at 1/11/2024  2:45 PM EST -----  Regarding: Blood Pressure  Contact: [phone]  You requested that I take my blood pressure and to email the results.  Random pressures have been between  120/67      and    126/81  Thanks for being my doctor and Happy New Year to you and the family  Ulisses Stone

## 2024-02-19 ENCOUNTER — OFFICE VISIT (OUTPATIENT)
Age: 84
End: 2024-02-19
Payer: MEDICARE

## 2024-02-19 VITALS — BODY MASS INDEX: 26.49 KG/M2 | HEIGHT: 65 IN | WEIGHT: 159 LBS

## 2024-02-19 DIAGNOSIS — Z96.651 STATUS POST UNICOMPARTMENTAL KNEE REPLACEMENT, RIGHT: Primary | ICD-10-CM

## 2024-02-19 PROCEDURE — G8484 FLU IMMUNIZE NO ADMIN: HCPCS | Performed by: ORTHOPAEDIC SURGERY

## 2024-02-19 PROCEDURE — 1036F TOBACCO NON-USER: CPT | Performed by: ORTHOPAEDIC SURGERY

## 2024-02-19 PROCEDURE — G8428 CUR MEDS NOT DOCUMENT: HCPCS | Performed by: ORTHOPAEDIC SURGERY

## 2024-02-19 PROCEDURE — G8417 CALC BMI ABV UP PARAM F/U: HCPCS | Performed by: ORTHOPAEDIC SURGERY

## 2024-02-19 PROCEDURE — 73562 X-RAY EXAM OF KNEE 3: CPT | Performed by: ORTHOPAEDIC SURGERY

## 2024-02-19 PROCEDURE — 1123F ACP DISCUSS/DSCN MKR DOCD: CPT | Performed by: ORTHOPAEDIC SURGERY

## 2024-02-19 PROCEDURE — 99213 OFFICE O/P EST LOW 20 MIN: CPT | Performed by: ORTHOPAEDIC SURGERY

## 2024-02-19 NOTE — PROGRESS NOTES
Patient: Tim Stone                MRN: [id number]       SSN: [id number]  YOB: 1940        AGE: 83 y.o.        SEX: male  BMI: Body mass index is 26.46 kg/m².    PCP: Nalini Blank MD  02/19/24    Chief Complaint: Knee Pain (Right knee)      1. Status post unicompartmental knee replacement, right  -     AMB POC X-RAY KNEE 3 VIEW        HPI:  Tim Stone is a 83 y.o. male with chief complaint of   Chief Complaint   Patient presents with    Knee Pain     Right knee     -Right medial unicompartmental knee replacement March 8, 2023    1 year status post right medial unicompartmental knee replacement.  This gets occasional pain in the anterior aspect of his knee usually in the morning but this dissipates as he gets moving.  No other issues.  He recently had a Mohs procedure for a cancerous lesion on the left side of his forehead.  Otherwise he is doing well with occasional sciatic symptoms as well.      IMAGING:  Imaging read by myself and interpreted as follows:    February 19, 2024:  Three-view x-rays of the right knee taken at the [facility] office including AP, lateral, sunrise views demonstrates a well-positioned cemented unicompartmental knee replacement without signs of loosening or complication.    May 30, 2023:  Three-view x-ray of the right knee including AP, lateral and sunrise view demonstrate a well-positioned medial unicompartmental knee replacement without signs of subsidence or change in position from previous.    PHYSICAL EXAMINATION:  Ht 1.651 m (5' 5\")   Wt 72.1 kg (159 lb)   BMI 26.46 kg/m²   Body mass index is 26.46 kg/m².  Wt Readings from Last 3 Encounters:   02/19/24 72.1 kg (159 lb)   11/28/23 72.1 kg (159 lb)   10/25/23 70.3 kg (155 lb)       GENERAL: Alert and oriented x3, in no acute distress.  HEENT: Normocephalic, atraumatic.    MSK: Right Knee Exam     Tenderness   The patient is experiencing tenderness in the medial joint line.    Range of Motion

## 2024-03-11 ENCOUNTER — TELEPHONE (OUTPATIENT)
Age: 84
End: 2024-03-11

## 2024-03-11 NOTE — TELEPHONE ENCOUNTER
[facility] reached out today on behalf of the patient, patient was in room, requesting we send documentation to them that states the patients duration for the antibiotics prior to dental work, asking will he need the premeds for life. Patient was provided his premed antibiotic today before his appointment to ensure he was covered.    Please review and fax the requested documentation if possible,   Fax: [phone]

## 2024-03-14 ENCOUNTER — CLINICAL DOCUMENTATION (OUTPATIENT)
Age: 84
End: 2024-03-14

## 2024-03-14 NOTE — PROGRESS NOTES
Form received from [facility] for Clarification via Fax at our [facility] location.    Blank form scanned into patient's chart.    Form placed in forms bin at [facility] location for completion.

## 2024-03-19 ENCOUNTER — CLINICAL DOCUMENTATION (OUTPATIENT)
Age: 84
End: 2024-03-19

## 2024-05-21 ENCOUNTER — HOSPITAL ENCOUNTER (OUTPATIENT)
Facility: HOSPITAL | Age: 84
Setting detail: SPECIMEN
Discharge: HOME | End: 2024-05-24
Payer: MEDICARE

## 2024-05-21 DIAGNOSIS — Z12.5 PROSTATE CANCER SCREENING: ICD-10-CM

## 2024-05-21 DIAGNOSIS — E78.00 PURE HYPERCHOLESTEROLEMIA: ICD-10-CM

## 2024-05-21 DIAGNOSIS — R73.01 IMPAIRED FASTING GLUCOSE: ICD-10-CM

## 2024-05-21 DIAGNOSIS — I10 ESSENTIAL HYPERTENSION: ICD-10-CM

## 2024-05-21 LAB
ALBUMIN SERPL-MCNC: 3.9 G/DL (ref 3.4–5)
ALBUMIN/GLOB SERPL: 1.5 (ref 0.8–1.7)
ALP SERPL-CCNC: 68 U/L (ref 45–117)
ALT SERPL-CCNC: 21 U/L (ref 16–61)
ANION GAP SERPL CALC-SCNC: 4 MMOL/L (ref 3–18)
APPEARANCE UR: CLEAR
AST SERPL-CCNC: 16 U/L (ref 10–38)
BACTERIA URNS QL MICRO: NEGATIVE /HPF
BASOPHILS # BLD: 0 K/UL (ref 0–0.1)
BASOPHILS NFR BLD: 1 % (ref 0–2)
BILIRUB SERPL-MCNC: 1.5 MG/DL (ref 0.2–1)
BILIRUB UR QL: NEGATIVE
BUN SERPL-MCNC: 9 MG/DL (ref 7–18)
BUN/CREAT SERPL: 10 (ref 12–20)
CALCIUM SERPL-MCNC: 9.1 MG/DL (ref 8.5–10.1)
CHLORIDE SERPL-SCNC: 110 MMOL/L (ref 100–111)
CHOLEST SERPL-MCNC: 143 MG/DL
CO2 SERPL-SCNC: 26 MMOL/L (ref 21–32)
COLOR UR: YELLOW
CREAT SERPL-MCNC: 0.86 MG/DL (ref 0.6–1.3)
CREAT UR-MCNC: 184 MG/DL (ref 30–125)
DIFFERENTIAL METHOD BLD: NORMAL
EOSINOPHIL # BLD: 0.1 K/UL (ref 0–0.4)
EOSINOPHIL NFR BLD: 2 % (ref 0–5)
EPITH CASTS URNS QL MICRO: NORMAL /LPF (ref 0–5)
ERYTHROCYTE [DISTWIDTH] IN BLOOD BY AUTOMATED COUNT: 13.5 % (ref 11.6–14.5)
EST. AVERAGE GLUCOSE BLD GHB EST-MCNC: 103 MG/DL
GLOBULIN SER CALC-MCNC: 2.6 G/DL (ref 2–4)
GLUCOSE SERPL-MCNC: 98 MG/DL (ref 74–99)
GLUCOSE UR STRIP.AUTO-MCNC: NEGATIVE MG/DL
HBA1C MFR BLD: 5.2 % (ref 4.2–5.6)
HCT VFR BLD AUTO: 44.9 % (ref 36–48)
HDLC SERPL-MCNC: 45 MG/DL (ref 40–60)
HDLC SERPL: 3.2 (ref 0–5)
HGB BLD-MCNC: 15.3 G/DL (ref 13–16)
HGB UR QL STRIP: NEGATIVE
IMM GRANULOCYTES # BLD AUTO: 0 K/UL (ref 0–0.04)
IMM GRANULOCYTES NFR BLD AUTO: 0 % (ref 0–0.5)
KETONES UR QL STRIP.AUTO: NEGATIVE MG/DL
LDLC SERPL CALC-MCNC: 84.2 MG/DL (ref 0–100)
LEUKOCYTE ESTERASE UR QL STRIP.AUTO: NEGATIVE
LIPID PANEL: NORMAL
LYMPHOCYTES # BLD: 1.1 K/UL (ref 0.9–3.6)
LYMPHOCYTES NFR BLD: 22 % (ref 21–52)
MCH RBC QN AUTO: 31.4 PG (ref 24–34)
MCHC RBC AUTO-ENTMCNC: 34.1 G/DL (ref 31–37)
MCV RBC AUTO: 92 FL (ref 78–100)
MICROALBUMIN UR-MCNC: 1.02 MG/DL (ref 0–3)
MICROALBUMIN/CREAT UR-RTO: 6 MG/G (ref 0–30)
MONOCYTES # BLD: 0.4 K/UL (ref 0.05–1.2)
MONOCYTES NFR BLD: 8 % (ref 3–10)
NEUTS SEG # BLD: 3.3 K/UL (ref 1.8–8)
NEUTS SEG NFR BLD: 67 % (ref 40–73)
NITRITE UR QL STRIP.AUTO: NEGATIVE
NRBC # BLD: 0 K/UL (ref 0–0.01)
NRBC BLD-RTO: 0 PER 100 WBC
PH UR STRIP: 6 (ref 5–8)
PLATELET # BLD AUTO: 194 K/UL (ref 135–420)
PMV BLD AUTO: 9.9 FL (ref 9.2–11.8)
POTASSIUM SERPL-SCNC: 4.3 MMOL/L (ref 3.5–5.5)
PROT SERPL-MCNC: 6.5 G/DL (ref 6.4–8.2)
PROT UR STRIP-MCNC: NEGATIVE MG/DL
PSA SERPL-MCNC: 1.2 NG/ML (ref 0–4)
RBC # BLD AUTO: 4.88 M/UL (ref 4.35–5.65)
RBC #/AREA URNS HPF: NORMAL /HPF (ref 0–5)
SODIUM SERPL-SCNC: 140 MMOL/L (ref 136–145)
SP GR UR REFRACTOMETRY: 1.02 (ref 1–1.03)
TRIGL SERPL-MCNC: 69 MG/DL
UROBILINOGEN UR QL STRIP.AUTO: 1 EU/DL (ref 0.2–1)
VLDLC SERPL CALC-MCNC: 13.8 MG/DL
WBC # BLD AUTO: 5 K/UL (ref 4.6–13.2)
WBC URNS QL MICRO: NORMAL /HPF (ref 0–4)

## 2024-05-21 PROCEDURE — 80061 LIPID PANEL: CPT

## 2024-05-21 PROCEDURE — 83036 HEMOGLOBIN GLYCOSYLATED A1C: CPT

## 2024-05-21 PROCEDURE — 36415 COLL VENOUS BLD VENIPUNCTURE: CPT

## 2024-05-21 PROCEDURE — 80053 COMPREHEN METABOLIC PANEL: CPT

## 2024-05-21 PROCEDURE — 81001 URINALYSIS AUTO W/SCOPE: CPT

## 2024-05-21 PROCEDURE — 85025 COMPLETE CBC W/AUTO DIFF WBC: CPT

## 2024-05-21 PROCEDURE — G0103 PSA SCREENING: HCPCS

## 2024-05-21 PROCEDURE — 82570 ASSAY OF URINE CREATININE: CPT

## 2024-05-21 PROCEDURE — 82043 UR ALBUMIN QUANTITATIVE: CPT

## 2024-05-28 ENCOUNTER — OFFICE VISIT (OUTPATIENT)
Age: 84
End: 2024-05-28

## 2024-05-28 VITALS
WEIGHT: 161 LBS | HEIGHT: 65 IN | OXYGEN SATURATION: 99 % | RESPIRATION RATE: 14 BRPM | TEMPERATURE: 98.5 F | HEART RATE: 59 BPM | DIASTOLIC BLOOD PRESSURE: 78 MMHG | BODY MASS INDEX: 26.82 KG/M2 | SYSTOLIC BLOOD PRESSURE: 134 MMHG

## 2024-05-28 DIAGNOSIS — H35.30 MACULAR DEGENERATION OF BOTH EYES, UNSPECIFIED TYPE: ICD-10-CM

## 2024-05-28 DIAGNOSIS — R73.01 IMPAIRED FASTING GLUCOSE: ICD-10-CM

## 2024-05-28 DIAGNOSIS — E78.00 PURE HYPERCHOLESTEROLEMIA: ICD-10-CM

## 2024-05-28 DIAGNOSIS — H81.20 VESTIBULAR NEURONITIS, UNSPECIFIED LATERALITY: ICD-10-CM

## 2024-05-28 DIAGNOSIS — I10 ESSENTIAL HYPERTENSION: Primary | ICD-10-CM

## 2024-05-28 DIAGNOSIS — E66.3 OVERWEIGHT (BMI 25.0-29.9): ICD-10-CM

## 2024-05-28 DIAGNOSIS — Z85.828 HISTORY OF SQUAMOUS CELL CARCINOMA OF SKIN: ICD-10-CM

## 2024-05-28 DIAGNOSIS — R41.3 MEMORY LOSS: ICD-10-CM

## 2024-05-28 DIAGNOSIS — Z00.00 MEDICARE ANNUAL WELLNESS VISIT, SUBSEQUENT: ICD-10-CM

## 2024-05-28 DIAGNOSIS — Z71.89 ACP (ADVANCE CARE PLANNING): ICD-10-CM

## 2024-05-28 RX ORDER — MV-MN/C/THEANINE/HERB NO.310 1000-200MG
POWDER IN PACKET (EA) ORAL
COMMUNITY

## 2024-05-28 SDOH — HEALTH STABILITY: PHYSICAL HEALTH: ON AVERAGE, HOW MANY DAYS PER WEEK DO YOU ENGAGE IN MODERATE TO STRENUOUS EXERCISE (LIKE A BRISK WALK)?: 3 DAYS

## 2024-05-28 SDOH — HEALTH STABILITY: PHYSICAL HEALTH: ON AVERAGE, HOW MANY MINUTES DO YOU ENGAGE IN EXERCISE AT THIS LEVEL?: 40 MIN

## 2024-05-28 ASSESSMENT — LIFESTYLE VARIABLES
HOW OFTEN DO YOU HAVE A DRINK CONTAINING ALCOHOL: 2-4 TIMES A MONTH
HOW MANY STANDARD DRINKS CONTAINING ALCOHOL DO YOU HAVE ON A TYPICAL DAY: 1 OR 2
HOW OFTEN DO YOU HAVE SIX OR MORE DRINKS ON ONE OCCASION: 1
HOW OFTEN DO YOU HAVE A DRINK CONTAINING ALCOHOL: 3
HOW MANY STANDARD DRINKS CONTAINING ALCOHOL DO YOU HAVE ON A TYPICAL DAY: 1

## 2024-05-28 ASSESSMENT — PATIENT HEALTH QUESTIONNAIRE - PHQ9
SUM OF ALL RESPONSES TO PHQ9 QUESTIONS 1 & 2: 0
SUM OF ALL RESPONSES TO PHQ QUESTIONS 1-9: 0
SUM OF ALL RESPONSES TO PHQ QUESTIONS 1-9: 0
2. FEELING DOWN, DEPRESSED OR HOPELESS: NOT AT ALL
1. LITTLE INTEREST OR PLEASURE IN DOING THINGS: NOT AT ALL
SUM OF ALL RESPONSES TO PHQ QUESTIONS 1-9: 0
SUM OF ALL RESPONSES TO PHQ QUESTIONS 1-9: 0

## 2024-05-28 NOTE — PROGRESS NOTES
Medicare Annual Wellness Visit    Tmi Stone is here for Medicare AWV    Assessment & Plan   Medicare annual wellness visit, subsequent  ACP (advance care planning)  Recommendations for Preventive Services Due: see orders and patient instructions/AVS.  Recommended screening schedule for the next 5-10 years is provided to the patient in written form: see Patient Instructions/AVS.     Return in about 6 months (around 11/28/2024), or if symptoms worsen or fail to improve.     Subjective   See office progress note for details.      Patient's complete Health Risk Assessment and screening values have been reviewed and are found in Flowsheets. The following problems were reviewed today and where indicated follow up appointments were made and/or referrals ordered.    Positive Risk Factor Screenings with Interventions:                Activity, Diet, and Weight:  On average, how many days per week do you engage in moderate to strenuous exercise (like a brisk walk)?: 3 days  On average, how many minutes do you engage in exercise at this level?: 40 min    Do you eat balanced/healthy meals regularly?: (!) No    Body mass index is 26.79 kg/m².    Do you eat balanced/healthy meals regularly Interventions:  Advised heart healthy diet and regular exercise         Hearing Screen:  Do you or your family notice any trouble with your hearing that hasn't been managed with hearing aids?: (!) Yes    Interventions:  Patient declines any further evaluation or treatment     Safety:  Do you have non-slip mats or non-slip surfaces or shower bars or grab bars in your shower or bathtub?: (!) No  Interventions:  Fall precautions stressed                   Objective   Vitals:    05/28/24 1026 05/28/24 1051 05/28/24 1054   BP: (!) 179/89 (!) 144/82 134/78   Pulse: 59     Resp: 14     Temp: 98.5 °F (36.9 °C)     TempSrc: Temporal     SpO2: 99%     Weight: 73 kg (161 lb)     Height: 1.651 m (5' 5\")        Body mass index is 26.79 kg/m².      
Tim Stone presents today for   Chief Complaint   Patient presents with    Medicare AWV       \"Have you been to the ER, urgent care clinic since your last visit?  Hospitalized since your last visit?\"    NO    “Have you seen or consulted any other health care providers outside of [facility] since your last visit?”    NO            
follow.  3. Hyperlipidemia. On moderate intensity dose atorvastatin 20 mg daily with LDL 84 and HDL 45 which represents good control. Emphasized importance of continued lifestyle modifications, including heart healthy diet and regular exercise.  Resumed exercising 3 days/week and advised to continue.  4. Impaired fasting glucose. Fasting glucose and HbA1c mildly elevated in past. However, normalized with weight loss and remains normal today with FPG 98 and HbA1c of 5.2.  Discussed importance of continued lifestyle modifications, including heart healthy low carbohydrate diet and regular exercise. Will continue to follow.  5.  History of squamous cell carcinoma, left forehead.  Required two wide excisions by Dr. Galicia followed by Mohs procedure by Dr. Omar Buck in order to completely excise.  Given the extent of the incision, required left forehead reconstructive surgery by Dr. Galicia on 10/25/2023 with adjacent tissue transfer and full-thickness skin graft.  Completely healed and continuing to be followed by Dr. Genoveva Durand with next visit on 8/2024.  6. History of vertigo. Reported worsening tinnintus and vertigo in 7/2020 suggestive of vestibular neuritis. Treated with meclizine with improvement.  Reported recurrent episode in 3/2021 and responded to meclizine over several days. Underwent contrast brain MRI (5/5/2021) with negative IAC noted.  Advised to continue meclizine as needed.  Not reporting any increase in symptoms today.  7. Multiple adenomatous polyps.  Underwent screening colonoscopy in 12/2021 with removal of 7 sessile polyps with pathology showing all to be tubular adenomas.  Repeat colonoscopy recommended for 3 years (due 12/2024).  Will place referral at next visit if not contacted to schedule.  8. History of diverticulosis.  Known extensive left sided diverticulosis on prior colonoscopy.  Has not had recurrent episode of diverticulitis in many years. Continue high fiber diet.  9. Right knee

## 2024-05-28 NOTE — ACP (ADVANCE CARE PLANNING)
Advance Care Planning     Advance Care Planning (ACP) Physician/NP/PA Conversation    Date of Conversation: 5/28/2024  Conducted with: Patient with Decision Making Capacity    Healthcare Decision Maker:      Primary Decision Maker: Sophia Stone - Spouse - [phone]    Secondary Decision Maker: Nataliia La - Child - [phone]    Click here to complete Healthcare Decision Makers including selection of the Healthcare Decision Maker Relationship (ie \"Primary\")  Today we confirmed healthcare decision-makers as his wife and daughter    Care Preferences:    Hospitalization:  \"If your health worsens and it becomes clear that your chance of recovery is unlikely, what would be your preference regarding hospitalization?\"  The patient would prefer hospitalization.    Ventilation:  \"If you were unable to breath on your own and your chance of recovery was unlikely, what would be your preference about the use of a ventilator (breathing machine) if it was available to you?\"  The patient would desire the use of a ventilator.    Resuscitation:  \"In the event your heart stopped as a result of an underlying serious health condition, would you want attempts made to restart your heart, or would you prefer a natural death?\"  Yes, attempt to resuscitate.    treatment goals, benefit/burden of treatment options, ventilation preferences, hospitalization preferences, resuscitation preferences, and end of life care preferences (vegetative state/imminent death)    Conversation Outcomes / Follow-Up Plan:  ACP complete - no further action today  Reviewed DNR/DNI and patient elects Full Code (Attempt Resuscitation)    Length of Voluntary ACP Conversation in minutes:  16 minutes    Nalini Blank MD

## 2024-06-01 PROBLEM — H35.30 MACULAR DEGENERATION OF BOTH EYES: Status: ACTIVE | Noted: 2024-06-01

## 2024-06-01 PROBLEM — Z85.828 HISTORY OF SQUAMOUS CELL CARCINOMA OF SKIN: Status: ACTIVE | Noted: 2023-12-02

## 2024-12-02 SDOH — ECONOMIC STABILITY: INCOME INSECURITY: HOW HARD IS IT FOR YOU TO PAY FOR THE VERY BASICS LIKE FOOD, HOUSING, MEDICAL CARE, AND HEATING?: NOT HARD AT ALL

## 2024-12-02 SDOH — ECONOMIC STABILITY: FOOD INSECURITY: WITHIN THE PAST 12 MONTHS, THE FOOD YOU BOUGHT JUST DIDN'T LAST AND YOU DIDN'T HAVE MONEY TO GET MORE.: NEVER TRUE

## 2024-12-02 SDOH — ECONOMIC STABILITY: FOOD INSECURITY: WITHIN THE PAST 12 MONTHS, YOU WORRIED THAT YOUR FOOD WOULD RUN OUT BEFORE YOU GOT MONEY TO BUY MORE.: NEVER TRUE

## 2024-12-02 SDOH — ECONOMIC STABILITY: TRANSPORTATION INSECURITY
IN THE PAST 12 MONTHS, HAS LACK OF TRANSPORTATION KEPT YOU FROM MEETINGS, WORK, OR FROM GETTING THINGS NEEDED FOR DAILY LIVING?: NO

## 2024-12-03 ENCOUNTER — OFFICE VISIT (OUTPATIENT)
Facility: CLINIC | Age: 84
End: 2024-12-03

## 2024-12-03 VITALS
WEIGHT: 161 LBS | SYSTOLIC BLOOD PRESSURE: 116 MMHG | HEIGHT: 65 IN | HEART RATE: 71 BPM | DIASTOLIC BLOOD PRESSURE: 66 MMHG | RESPIRATION RATE: 16 BRPM | BODY MASS INDEX: 26.82 KG/M2 | TEMPERATURE: 98.5 F | OXYGEN SATURATION: 99 %

## 2024-12-03 DIAGNOSIS — R41.3 MEMORY LOSS: ICD-10-CM

## 2024-12-03 DIAGNOSIS — H35.3131 EARLY DRY STAGE NONEXUDATIVE AGE-RELATED MACULAR DEGENERATION OF BOTH EYES: ICD-10-CM

## 2024-12-03 DIAGNOSIS — Z12.11 SCREENING FOR MALIGNANT NEOPLASM OF COLON: ICD-10-CM

## 2024-12-03 DIAGNOSIS — Z85.828 HISTORY OF SQUAMOUS CELL CARCINOMA OF SKIN: ICD-10-CM

## 2024-12-03 DIAGNOSIS — J30.9 ALLERGIC RHINITIS, UNSPECIFIED SEASONALITY, UNSPECIFIED TRIGGER: ICD-10-CM

## 2024-12-03 DIAGNOSIS — E78.00 PURE HYPERCHOLESTEROLEMIA: ICD-10-CM

## 2024-12-03 DIAGNOSIS — Z86.0101 HISTORY OF ADENOMATOUS AND SERRATED COLON POLYPS: ICD-10-CM

## 2024-12-03 DIAGNOSIS — R73.01 IMPAIRED FASTING GLUCOSE: ICD-10-CM

## 2024-12-03 DIAGNOSIS — I10 ESSENTIAL HYPERTENSION: Primary | ICD-10-CM

## 2024-12-03 DIAGNOSIS — E55.9 VITAMIN D DEFICIENCY: ICD-10-CM

## 2024-12-03 RX ORDER — FLUTICASONE PROPIONATE 50 MCG
1 SPRAY, SUSPENSION (ML) NASAL 2 TIMES DAILY
Qty: 48 G | Refills: 3 | Status: SHIPPED | OUTPATIENT
Start: 2024-12-03

## 2024-12-03 RX ORDER — ATORVASTATIN CALCIUM 20 MG/1
20 TABLET, FILM COATED ORAL DAILY
Qty: 90 TABLET | Refills: 3 | Status: SHIPPED | OUTPATIENT
Start: 2024-12-03

## 2024-12-03 NOTE — PROGRESS NOTES
Tim Stone presents today for   Chief Complaint   Patient presents with    6 Month Follow-Up       \"Have you been to the ER, urgent care clinic since your last visit?  Hospitalized since your last visit?\"    NO    “Have you seen or consulted any other health care providers outside of [facility] since your last visit?”    NO          
knee pain following a mechanical fall in 10/2021. Did not improve with conservative measures.  Right knee x-ray (11/2/2022) mild-moderate medial compartment joint space narrowing with subchondral sclerosis and marginal osteophyte formation consistent with osteoarthritis.  Right knee CT (12/20/2022) moderately advanced right knee osteoarthrosis with tricompartmental osteophytosis and medial compartment subchondral sclerosis/subchondral cysts with vacuum phenomenon.  Completed medial unicompartmental arthroplasty on 3/8/2023 with Dr. Beckford with overall improvement.  Completed physical therapy and not noting any pain.  Followed up with Dr. Beckford on 2/19/2024 and released from care.  No ongoing issues today.  10. History of COVID-19.  Tested positive on 10/5/2023 and prescribed Paxlovid.  Tolerated 4 days of therapy and then discontinued.  Reports complete resolution of symptoms today without sequela.  11. Bilateral macular degeneration.  New diagnosis in 4/2024 and started on AREDS 2 twice daily.  Evaluated by Dr. Florian in 3/2024 and advised MD mild and early stage.  Continuing to follow with Dr. Anderson every 6 months.  12. Allergic rhinitis.  Finding Flonase to be quite effective in controlling symptoms.  Will prescribe today.  13. Overweight.  Weight remaining overall stable today. Emphasized importance of continued attempts at lifestyle modifications, including heart healthy diet and regular exercise. Will continue to monitor.    14. Health maintenance.  Completed the Pfizer COVID-19 vaccine series and received two Pfizer booster doses and the updated bivalent booster dose.  Received first dose of Shingrix vaccine and advised to complete second dose if not already done.  Discussed recommended vaccines for fall, including influenza vaccine and updated COVID vaccine.  Already received new RSV vaccine. Other immunizations up to date.  Colonoscopy due and will place referral to Dr. Ricahrds. Prostate cancer

## 2024-12-07 PROBLEM — H35.3131 EARLY DRY STAGE NONEXUDATIVE AGE-RELATED MACULAR DEGENERATION OF BOTH EYES: Status: ACTIVE | Noted: 2024-06-01

## 2025-02-12 ENCOUNTER — TELEPHONE (OUTPATIENT)
Age: 85
End: 2025-02-12

## 2025-02-12 NOTE — TELEPHONE ENCOUNTER
Pt aware provider is out of office.     Called pt to set an appointment for a colonoscopy. Pt would like to talk with Dr. Richards about the pros and cons of having a colonoscopy at the age of 84.     Dr. Richards, do you want the pt to come in for an appointment or would you like to talk to him over the phone? Pt is not having any pain or other concerns at this time. Please advise.       Pt can be reached at [phone].

## 2025-03-12 ENCOUNTER — NURSE ONLY (OUTPATIENT)
Age: 85
End: 2025-03-12

## 2025-03-12 VITALS
BODY MASS INDEX: 26.99 KG/M2 | RESPIRATION RATE: 16 BRPM | HEIGHT: 65 IN | HEART RATE: 61 BPM | TEMPERATURE: 97.1 F | WEIGHT: 162 LBS | OXYGEN SATURATION: 97 %

## 2025-03-12 DIAGNOSIS — Z86.0100 HISTORY OF COLON POLYPS: ICD-10-CM

## 2025-03-12 DIAGNOSIS — I10 ESSENTIAL HYPERTENSION: Primary | ICD-10-CM

## 2025-03-12 DIAGNOSIS — Z12.11 COLON CANCER SCREENING: ICD-10-CM

## 2025-03-12 NOTE — PROGRESS NOTES
Colon Screen    Patient: Tim Stone MRN: [id number]  SSN: [id number]    YOB: 1940  Age: 84 y.o.  Sex: male        Subjective:   Tim Stone is here to schedule his recall colonoscopy. His PCP is Nalini Blank MD.  Patient referred for colonoscopy for   Personal history of colon polyps (screening only). Patient denies rectal pain or bleeding.  Abdominal surgeries as described below, specifically none.  Family history as described below, specifically none. Last colonoscopy was 4 years ago.    No Known Allergies    Past Medical History:   Diagnosis Date    Abnormal glucose     Arthritis     Calculus of kidney     Colon polyps     Diverticulitis 2019    Diverticulosis of colon 2016    Colonoscopy (3/2016) advanced left sided diverticulosis    Erectile dysfunction     Hyperlipidemia     Hypertension     no medications    Vertigo 2020     Past Surgical History:   Procedure Laterality Date    BLEPHAROPLASTY      CATARACT REMOVAL  2012    both eyes    COLONOSCOPY      2011, Dr. Genao, colon polyps, was going yearly    COLONOSCOPY N/A 12/15/2021    COLONOSCOPY with Polypectomies performed by Rayray Richards MD at [facility] ENDOSCOPY    COLONOSCOPY W/BIOPSY SINGLE/MULTIPLE  3-4-16    Dr. Martinez    KNEE ARTHROPLASTY Right 3/8/2023    RIGHT MEDIAL UNICOMPARTMENTAL KNEE ARTHROPLASTY, JENNIFER SARA performed by Tyrone Magana DO at [facility] MAIN OR    OTHER SURGICAL HISTORY      cn    VASECTOMY        Family History   Problem Relation Age of Onset    Elevated Lipids Mother     Osteoarthritis Mother     Heart Disease Father      Social History     Tobacco Use    Smoking status: Former     Current packs/day: 0.00     Average packs/day: 0.5 packs/day for 20.0 years (10.0 ttl pk-yrs)     Types: Cigarettes     Quit date: 1988     Years since quittin.2    Smokeless tobacco: Never   Substance Use Topics    Alcohol use: Yes     Alcohol/week: 1.0 standard drink of alcohol

## 2025-03-13 ENCOUNTER — PREP FOR PROCEDURE (OUTPATIENT)
Age: 85
End: 2025-03-13

## 2025-03-13 DIAGNOSIS — Z12.11 COLON CANCER SCREENING: ICD-10-CM

## 2025-03-13 DIAGNOSIS — Z86.0100 HISTORY OF COLON POLYPS: ICD-10-CM

## 2025-03-17 ENCOUNTER — HOSPITAL ENCOUNTER (OUTPATIENT)
Facility: HOSPITAL | Age: 85
Discharge: HOME | End: 2025-03-20
Payer: MEDICARE

## 2025-03-17 DIAGNOSIS — I10 ESSENTIAL HYPERTENSION: ICD-10-CM

## 2025-03-17 LAB
ALBUMIN SERPL-MCNC: 3.7 G/DL (ref 3.4–5)
ALBUMIN/GLOB SERPL: 1.5 (ref 0.8–1.7)
ALP SERPL-CCNC: 71 U/L (ref 45–117)
ALT SERPL-CCNC: 24 U/L (ref 16–61)
ANION GAP SERPL CALC-SCNC: 4 MMOL/L (ref 3–18)
AST SERPL-CCNC: 16 U/L (ref 10–38)
BILIRUB SERPL-MCNC: 0.8 MG/DL (ref 0.2–1)
BUN SERPL-MCNC: 18 MG/DL (ref 7–18)
BUN/CREAT SERPL: 17 (ref 12–20)
CALCIUM SERPL-MCNC: 9.1 MG/DL (ref 8.5–10.1)
CHLORIDE SERPL-SCNC: 109 MMOL/L (ref 100–111)
CO2 SERPL-SCNC: 29 MMOL/L (ref 21–32)
CREAT SERPL-MCNC: 1.05 MG/DL (ref 0.6–1.3)
EKG ATRIAL RATE: 59 BPM
EKG DIAGNOSIS: NORMAL
EKG P AXIS: 65 DEGREES
EKG P-R INTERVAL: 182 MS
EKG Q-T INTERVAL: 440 MS
EKG QRS DURATION: 86 MS
EKG QTC CALCULATION (BAZETT): 435 MS
EKG R AXIS: 47 DEGREES
EKG T AXIS: 46 DEGREES
EKG VENTRICULAR RATE: 59 BPM
GLOBULIN SER CALC-MCNC: 2.4 G/DL (ref 2–4)
GLUCOSE SERPL-MCNC: 93 MG/DL (ref 74–99)
POTASSIUM SERPL-SCNC: 3.7 MMOL/L (ref 3.5–5.5)
PROT SERPL-MCNC: 6.1 G/DL (ref 6.4–8.2)
SODIUM SERPL-SCNC: 142 MMOL/L (ref 136–145)

## 2025-03-17 PROCEDURE — 36415 COLL VENOUS BLD VENIPUNCTURE: CPT

## 2025-03-17 PROCEDURE — 93010 ELECTROCARDIOGRAM REPORT: CPT | Performed by: INTERNAL MEDICINE

## 2025-03-17 PROCEDURE — 80053 COMPREHEN METABOLIC PANEL: CPT

## 2025-03-17 PROCEDURE — 93005 ELECTROCARDIOGRAM TRACING: CPT

## 2025-03-20 NOTE — PROGRESS NOTES
Instructions for your surgery at [facility]      Today's Date:  3/20/2025      Patient's Name:  Tim Stone           Surgery Date:  03/28/2025              Please enter the main entrance of the hospital and check-in at the front security desk located in the lobby. They will direct you to the area to report for your surgery.     Do NOT eat or drink anything, including candy, gum, or ice chips after midnight prior to your surgery, unless you have specific instructions from your surgeon or anesthesia provider to do so.  Brush your teeth before coming to the hospital. You may swish with water, but do not swallow.  No smoking/Vaping/E-Cigarettes 24 hours prior to the day of surgery.  No alcohol 24 hours prior to the day of surgery.  No recreational drugs for one week prior to the day of surgery.  Bring Photo ID, Insurance information, and Co-pay if required on day of surgery.  Bring in pertinent legal documents, such as, Medical Power of , DNR, Advance Directive, etc.  Leave all valuables, including money/purse, weapons at home.  Remove all jewelry, including ALL body piercings, nail polish, acrylic nails, and makeup (including mascara); no lotions, powders, deodorant, or perfume/cologne/after shave on the skin.  Follow instruction for Hibiclens washes and CHG wipes from surgeon's office.   Glasses and dentures may be worn to the hospital. They must be removed prior to surgery. Please bring case/container for glasses or dentures.   Contact lenses should not be worn on day of surgery.   Call your doctor's office if symptoms of a cold or illness develop within 24-48 hours prior to your surgery.  Call your doctor's office if you have any questions concerning insurance or co-pays.  15. AN ADULT (relative or friend 18 years or older) MUST DRIVE YOU HOME AFTER YOUR SURGERY.  16. Please make arrangements for a responsible adult (18 years or older) to be with you for 24 hours after your

## 2025-03-27 ENCOUNTER — ANESTHESIA EVENT (OUTPATIENT)
Facility: HOSPITAL | Age: 85
End: 2025-03-27
Payer: MEDICARE

## 2025-03-27 ENCOUNTER — TELEPHONE (OUTPATIENT)
Age: 85
End: 2025-03-27

## 2025-03-28 ENCOUNTER — ANESTHESIA (OUTPATIENT)
Facility: HOSPITAL | Age: 85
End: 2025-03-28
Payer: MEDICARE

## 2025-03-28 ENCOUNTER — HOSPITAL ENCOUNTER (OUTPATIENT)
Facility: HOSPITAL | Age: 85
Setting detail: OUTPATIENT SURGERY
Discharge: HOME | End: 2025-03-28
Attending: COLON & RECTAL SURGERY | Admitting: COLON & RECTAL SURGERY
Payer: MEDICARE

## 2025-03-28 VITALS
DIASTOLIC BLOOD PRESSURE: 78 MMHG | RESPIRATION RATE: 15 BRPM | SYSTOLIC BLOOD PRESSURE: 119 MMHG | TEMPERATURE: 97.5 F | WEIGHT: 156 LBS | HEIGHT: 65 IN | HEART RATE: 56 BPM | BODY MASS INDEX: 25.99 KG/M2 | OXYGEN SATURATION: 98 %

## 2025-03-28 PROCEDURE — 3600007512: Performed by: COLON & RECTAL SURGERY

## 2025-03-28 PROCEDURE — 45380 COLONOSCOPY AND BIOPSY: CPT | Performed by: COLON & RECTAL SURGERY

## 2025-03-28 PROCEDURE — 6360000002 HC RX W HCPCS: Performed by: ANESTHESIOLOGY

## 2025-03-28 PROCEDURE — 2580000003 HC RX 258: Performed by: NURSE ANESTHETIST, CERTIFIED REGISTERED

## 2025-03-28 PROCEDURE — 3700000000 HC ANESTHESIA ATTENDED CARE: Performed by: COLON & RECTAL SURGERY

## 2025-03-28 PROCEDURE — 3700000001 HC ADD 15 MINUTES (ANESTHESIA): Performed by: COLON & RECTAL SURGERY

## 2025-03-28 PROCEDURE — 7100000010 HC PHASE II RECOVERY - FIRST 15 MIN: Performed by: COLON & RECTAL SURGERY

## 2025-03-28 PROCEDURE — 3600007502: Performed by: COLON & RECTAL SURGERY

## 2025-03-28 PROCEDURE — 2709999900 HC NON-CHARGEABLE SUPPLY: Performed by: COLON & RECTAL SURGERY

## 2025-03-28 PROCEDURE — 7100000000 HC PACU RECOVERY - FIRST 15 MIN: Performed by: COLON & RECTAL SURGERY

## 2025-03-28 PROCEDURE — 45385 COLONOSCOPY W/LESION REMOVAL: CPT | Performed by: COLON & RECTAL SURGERY

## 2025-03-28 PROCEDURE — 88305 TISSUE EXAM BY PATHOLOGIST: CPT

## 2025-03-28 RX ORDER — LIDOCAINE HYDROCHLORIDE 10 MG/ML
1 INJECTION, SOLUTION EPIDURAL; INFILTRATION; INTRACAUDAL; PERINEURAL
Status: DISCONTINUED | OUTPATIENT
Start: 2025-03-28 | End: 2025-03-28 | Stop reason: HOSPADM

## 2025-03-28 RX ORDER — PROPOFOL 10 MG/ML
INJECTION, EMULSION INTRAVENOUS
Status: DISCONTINUED | OUTPATIENT
Start: 2025-03-28 | End: 2025-03-28 | Stop reason: SDUPTHER

## 2025-03-28 RX ORDER — LIDOCAINE HYDROCHLORIDE 20 MG/ML
INJECTION, SOLUTION EPIDURAL; INFILTRATION; INTRACAUDAL; PERINEURAL
Status: DISCONTINUED | OUTPATIENT
Start: 2025-03-28 | End: 2025-03-28 | Stop reason: SDUPTHER

## 2025-03-28 RX ORDER — SODIUM CHLORIDE, SODIUM LACTATE, POTASSIUM CHLORIDE, CALCIUM CHLORIDE 600; 310; 30; 20 MG/100ML; MG/100ML; MG/100ML; MG/100ML
INJECTION, SOLUTION INTRAVENOUS CONTINUOUS
Status: DISCONTINUED | OUTPATIENT
Start: 2025-03-28 | End: 2025-03-28 | Stop reason: HOSPADM

## 2025-03-28 RX ADMIN — PROPOFOL 150 MG: 10 INJECTION, EMULSION INTRAVENOUS at 10:41

## 2025-03-28 RX ADMIN — SODIUM CHLORIDE, SODIUM LACTATE, POTASSIUM CHLORIDE, AND CALCIUM CHLORIDE: 600; 310; 30; 20 INJECTION, SOLUTION INTRAVENOUS at 09:26

## 2025-03-28 RX ADMIN — LIDOCAINE HYDROCHLORIDE 100 MG: 20 INJECTION, SOLUTION EPIDURAL; INFILTRATION; INTRACAUDAL; PERINEURAL at 10:32

## 2025-03-28 RX ADMIN — PROPOFOL 150 MG: 10 INJECTION, EMULSION INTRAVENOUS at 10:32

## 2025-03-28 RX ADMIN — PROPOFOL 100 MG: 10 INJECTION, EMULSION INTRAVENOUS at 10:51

## 2025-03-28 ASSESSMENT — PAIN - FUNCTIONAL ASSESSMENT: PAIN_FUNCTIONAL_ASSESSMENT: NONE - DENIES PAIN

## 2025-03-28 NOTE — DISCHARGE INSTRUCTIONS
Await pathology results for timing to future colonoscopy.  No aspirin or ibuprofen (e.g. Aleve, Motrin, Advil) for 5 days.   Consider sleep study.    Colonoscopy: What to Expect at Home  Your Recovery  After a colonoscopy, you'll stay at the clinic until you wake up. Then you can go home. But you'll need to arrange for a ride. Your doctor will tell you when you can eat and do your other usual activities.  Your doctor will talk to you about when you'll need your next colonoscopy. Your doctor can help you decide how often you need to be checked. This will depend on the results of your test and your risk for colorectal cancer.  After the test, you may be bloated or have gas pains. You may need to pass gas. If a biopsy was done or a polyp was removed, you may have streaks of blood in your stool (feces) for a few days. Problems such as heavy rectal bleeding may not occur until several weeks after the test. This isn't common. But it can happen after polyps are removed.  This care sheet gives you a general idea about how long it will take for you to recover. But each person recovers at a different pace. Follow the steps below to get better as quickly as possible.  How can you care for yourself at home?  Activity    Rest when you feel tired.     You can do your normal activities when it feels okay to do so.   Diet    Follow your doctor's directions for eating.     Unless your doctor has told you not to, drink plenty of fluids. This helps to replace the fluids that were lost during the colon prep.     Do not drink alcohol.   Medicines    Your doctor will tell you if and when you can restart your medicines. You will also be given instructions about taking any new medicines.     If you take aspirin or some other blood thinner, ask your doctor if and when to start taking it again. Make sure that you understand exactly what your doctor wants you to do.     If polyps were removed or a biopsy was done during the test, your doctor

## 2025-03-28 NOTE — H&P
HPI: Tim Stone is a 84 y.o. male presenting with chief complain of history of colorectal polyps.    Past Medical History:   Diagnosis Date    Abnormal glucose     Arthritis     Calculus of kidney     Colon polyps     Diverticulitis 2019    Diverticulosis of colon 2016    Colonoscopy (3/2016) advanced left sided diverticulosis    Erectile dysfunction     Hyperlipidemia     Hypertension     no medications    Vertigo 2020       Past Surgical History:   Procedure Laterality Date    BLEPHAROPLASTY      CATARACT REMOVAL  2012    both eyes    COLONOSCOPY      2011, Dr. Genao, colon polyps, was going yearly    COLONOSCOPY N/A 12/15/2021    COLONOSCOPY with Polypectomies performed by Rayray Richards MD at [facility] ENDOSCOPY    COLONOSCOPY W/BIOPSY SINGLE/MULTIPLE  3-4-16    Dr. Martinez    KNEE ARTHROPLASTY Right 3/8/2023    RIGHT MEDIAL UNICOMPARTMENTAL KNEE ARTHROPLASTY, JENNIFER SARA performed by Tyrone Magana DO at [facility] MAIN OR    OTHER SURGICAL HISTORY      cn    VASECTOMY         Family History   Problem Relation Age of Onset    Elevated Lipids Mother     Osteoarthritis Mother     Heart Disease Father        Social History     Socioeconomic History    Marital status:      Spouse name: None    Number of children: None    Years of education: None    Highest education level: None   Tobacco Use    Smoking status: Former     Current packs/day: 0.00     Average packs/day: 0.5 packs/day for 20.0 years (10.0 ttl pk-yrs)     Types: Cigarettes     Quit date: 1988     Years since quittin.3    Smokeless tobacco: Never   Vaping Use    Vaping status: Never Used   Substance and Sexual Activity    Alcohol use: Yes     Alcohol/week: 1.0 standard drink of alcohol     Types: 1 Glasses of wine per week     Comment: wine occasionally    Drug use: No    Sexual activity: Not Currently     Social Drivers of Health     Financial Resource Strain: Low Risk  (2024)    Overall Financial Resource  Strain (CARDIA)     Difficulty of Paying Living Expenses: Not hard at all   Food Insecurity: No Food Insecurity (12/2/2024)    Hunger Vital Sign     Worried About Running Out of Food in the Last Year: Never true     Ran Out of Food in the Last Year: Never true   Transportation Needs: Unknown (12/2/2024)    PRAPARE - Transportation     Lack of Transportation (Non-Medical): No   Physical Activity: Insufficiently Active (5/28/2024)    Exercise Vital Sign     Days of Exercise per Week: 3 days     Minutes of Exercise per Session: 40 min   Housing Stability: Unknown (12/2/2024)    Housing Stability Vital Sign     Homeless in the Last Year: No       Current Outpatient Medications   Medication Instructions    atorvastatin (LIPITOR) 20 mg, Oral, DAILY    Cholecalciferol 2,000 Units, DAILY    docusate sodium (COLACE) 100 mg, DAILY    fluticasone (FLONASE) 50 MCG/ACT nasal spray 1 spray, Each Nostril, 2 TIMES DAILY    Misc Natural Products (NEURIVA) CAPS Take by mouth    Multiple Vitamins-Minerals (ICAPS AREDS 2 PO) 2 tablets, DAILY    sildenafil (VIAGRA) 50 mg, Oral, PRN, Take approximately 1 hour before sexual activity.        No Known Allergies    ROS: negative    Vitals:    03/28/25 0919   BP: (!) 147/89   Pulse: 58   Resp: 11   Temp: 97.9 °F (36.6 °C)   SpO2: 100%       Physical Exam  Heart: RRR  Lungs: CTAB  Constitutional:       Appearance: He is well-developed.   HENT:      Head: Normocephalic and atraumatic.   Abdominal:      General: There is no distension.      Palpations: Abdomen is soft.      Tenderness: There is no abdominal tenderness.   Skin:     General: Skin is warm and dry    Assessment / Plan    colonoscopy    The diagnoses and plan were discussed with the patient. All questions answered.  Plan of care agreed to by all concerned.

## 2025-03-28 NOTE — ANESTHESIA PRE PROCEDURE
\"POCK\", \"POCCL\", \"POCBUN\", \"POCHEMO\", \"POCHCT\" in the last 72 hours.    Coags:   Lab Results   Component Value Date/Time    PROTIME 14.0 02/23/2023 09:30 AM    INR 1.0 02/23/2023 09:30 AM    APTT 36.9 02/23/2023 09:30 AM       HCG (If Applicable): No results found for: \"PREGTESTUR\", \"PREGSERUM\", \"HCG\", \"HCGQUANT\"     ABGs: No results found for: \"PHART\", \"PO2ART\", \"JLU4GCK\", \"HMO4LJN\", \"BEART\", \"G6TCZCFU\"     Type & Screen (If Applicable):  Lab Results   Component Value Date    ABORH O POSITIVE 02/23/2023    LABANTI NEG 02/23/2023       Drug/Infectious Status (If Applicable):  No results found for: \"HIV\", \"HEPCAB\"    COVID-19 Screening (If Applicable): No results found for: \"COVID19\"        Anesthesia Evaluation    Airway: Mallampati: II          Dental:      Comment: Several missing  teeth     Pulmonary:normal exam                               Cardiovascular:    (+) hypertension:        Rhythm: regular  Rate: normal                    Neuro/Psych:               GI/Hepatic/Renal:             Endo/Other:                     Abdominal:             Vascular:   + PVD, aortic or cerebral.       Other Findings:       Anesthesia Plan      general and regional     ASA 3       Induction: intravenous.      Anesthetic plan and risks discussed with patient.        Attending anesthesiologist reviewed and agrees with Preprocedure content            Francisco Valerio MD   3/28/2025

## 2025-03-28 NOTE — OP NOTE
Colonoscopy Procedure Note      Tim Stone  1940  [id number]                Date of Procedure: 03/28/25    Preoperative diagnosis: Hx polyps    Postoperative diagnosis: Polyps ascending transverse colon, rectum, anus    :  Rayray Richards MD    Assistant(s): Circulator: Ashly Badillo RN; Sharona Seaman, RN    Sedation: MAC    Complications: None    Implants: None    Procedure Details:  Prior to the procedure, a history and physical were performed. The patient’s medications, allergies and sensitivities were reviewed and all questions were answered.  After informed consent was obtained for the procedure, with all risks and benefits of procedure explained. The patient was taken to the endoscopy suite and placed in the left lateral decubitus position.  Patient identification and proposed procedure were verified prior to the procedure by the nurse and I. After sequential anesthesia administered by anesthesiologist, a digital rectal exam was performed and was normal.  The Olympus video colonoscope was introduced through the anus and advanced to cecum, which was identified by the ileocecal valve and appendiceal orifice.   The colonoscope was slowly withdrawn and the mucosa examined for any abnormalities.  Cecal withdrawal time was greater than 6 minutes. The patient tolerated the procedure well. There were no complications.    Bowel Prep Quality: good.     Findings/Interventions:   Polyps - #1, 7 mm in size, located in the ascending colon, removed by snare cautery and retrieved for pathology, - #2, 5 mm in size, located in the transverse colon, removed by snare cautery and retrieved for pathology, - #3, 5 mm in size, located in the transverse colon, removed by cold biopsy and sent for pathology, - #4, 5 mm in size, located in the rectum, removed by cold biopsy and sent for pathology, - #5, 5 mm in size, located in the rectum, removed by cold biopsy and sent for pathology, - #6, possible 1.5 cm

## 2025-03-28 NOTE — ANESTHESIA POSTPROCEDURE EVALUATION
Department of Anesthesiology  Postprocedure Note    Patient: Tim Stone  MRN: [id number]  YOB: 1940  Date of evaluation: 3/28/2025    Procedure Summary       Date: 03/28/25 Room / Location: [facility] ENDO 03 / [facility] ENDOSCOPY    Anesthesia Start: 1027 Anesthesia Stop: 1116    Procedure: COLONOSCOPY DIAGNOSTIC with polypectomies by hot snare and forceps and bxs (Abdomen) Diagnosis:       Colon cancer screening      History of colon polyps      (Colon cancer screening [Z12.11])      (History of colon polyps [Z86.0100])    Surgeons: Rayray Richards MD Responsible Provider: Francisco Valerio MD    Anesthesia Type: MAC ASA Status: 3            Anesthesia Type: MAC    Lawanda Phase I: Lawanda Score: 8    Lawanda Phase II: Lawanda Score: 10    Anesthesia Post Evaluation    Patient location during evaluation: bedside  Patient participation: complete - patient participated  Level of consciousness: awake  Pain score: 0  Airway patency: patent  Nausea & Vomiting: no nausea  Cardiovascular status: hemodynamically stable  Respiratory status: acceptable  Hydration status: euvolemic  Pain management: satisfactory to patient    No notable events documented.

## 2025-04-12 PROBLEM — Z12.11 COLON CANCER SCREENING: Status: RESOLVED | Noted: 2025-03-13 | Resolved: 2025-04-12

## 2025-06-03 ENCOUNTER — HOSPITAL ENCOUNTER (OUTPATIENT)
Facility: HOSPITAL | Age: 85
Setting detail: SPECIMEN
Discharge: HOME | End: 2025-06-06
Payer: MEDICARE

## 2025-06-03 DIAGNOSIS — I10 ESSENTIAL HYPERTENSION: ICD-10-CM

## 2025-06-03 DIAGNOSIS — E78.00 PURE HYPERCHOLESTEROLEMIA: ICD-10-CM

## 2025-06-03 DIAGNOSIS — R73.01 IMPAIRED FASTING GLUCOSE: ICD-10-CM

## 2025-06-03 DIAGNOSIS — H35.3131 EARLY DRY STAGE NONEXUDATIVE AGE-RELATED MACULAR DEGENERATION OF BOTH EYES: ICD-10-CM

## 2025-06-03 DIAGNOSIS — E55.9 VITAMIN D DEFICIENCY: ICD-10-CM

## 2025-06-03 LAB
25(OH)D3 SERPL-MCNC: 47 NG/ML (ref 30–100)
ALBUMIN SERPL-MCNC: 4.1 G/DL (ref 3.4–5)
ALBUMIN/GLOB SERPL: 1.6 (ref 0.8–1.7)
ALP SERPL-CCNC: 83 U/L (ref 45–117)
ALT SERPL-CCNC: 25 U/L (ref 10–50)
ANION GAP SERPL CALC-SCNC: 10 MMOL/L (ref 3–18)
APPEARANCE UR: CLEAR
AST SERPL-CCNC: 25 U/L (ref 10–38)
BACTERIA URNS QL MICRO: NEGATIVE /HPF
BASOPHILS # BLD: 0.05 K/UL (ref 0–0.1)
BASOPHILS NFR BLD: 0.8 % (ref 0–2)
BILIRUB SERPL-MCNC: 1.2 MG/DL (ref 0.2–1)
BILIRUB UR QL: NEGATIVE
BUN SERPL-MCNC: 10 MG/DL (ref 6–23)
BUN/CREAT SERPL: 12 (ref 12–20)
CALCIUM SERPL-MCNC: 10 MG/DL (ref 8.5–10.1)
CHLORIDE SERPL-SCNC: 105 MMOL/L (ref 98–107)
CHOLEST SERPL-MCNC: 164 MG/DL
CO2 SERPL-SCNC: 25 MMOL/L (ref 21–32)
COLOR UR: YELLOW
CREAT SERPL-MCNC: 0.85 MG/DL (ref 0.6–1.3)
CREAT UR-MCNC: 172 MG/DL (ref 30–125)
DIFFERENTIAL METHOD BLD: ABNORMAL
EOSINOPHIL # BLD: 0.09 K/UL (ref 0–0.4)
EOSINOPHIL NFR BLD: 1.5 % (ref 0–5)
EPITH CASTS URNS QL MICRO: ABNORMAL /LPF (ref 0–5)
ERYTHROCYTE [DISTWIDTH] IN BLOOD BY AUTOMATED COUNT: 13.4 % (ref 11.6–14.5)
EST. AVERAGE GLUCOSE BLD GHB EST-MCNC: 111 MG/DL
GLOBULIN SER CALC-MCNC: 2.5 G/DL (ref 2–4)
GLUCOSE SERPL-MCNC: 104 MG/DL (ref 74–108)
GLUCOSE UR STRIP.AUTO-MCNC: NEGATIVE MG/DL
HBA1C MFR BLD: 5.5 % (ref 4.2–5.6)
HCT VFR BLD AUTO: 47.7 % (ref 36–48)
HDLC SERPL-MCNC: 45 MG/DL (ref 40–60)
HDLC SERPL: 3.7 (ref 0–5)
HGB BLD-MCNC: 15.8 G/DL (ref 13–16)
HGB UR QL STRIP: NEGATIVE
IMM GRANULOCYTES # BLD AUTO: 0.01 K/UL (ref 0–0.04)
IMM GRANULOCYTES NFR BLD AUTO: 0.2 % (ref 0–0.5)
KETONES UR QL STRIP.AUTO: ABNORMAL MG/DL
LDLC SERPL CALC-MCNC: 102 MG/DL (ref 0–100)
LEUKOCYTE ESTERASE UR QL STRIP.AUTO: NEGATIVE
LYMPHOCYTES # BLD: 1.12 K/UL (ref 0.9–3.6)
LYMPHOCYTES NFR BLD: 18.5 % (ref 21–52)
MCH RBC QN AUTO: 30.9 PG (ref 24–34)
MCHC RBC AUTO-ENTMCNC: 33.1 G/DL (ref 31–37)
MCV RBC AUTO: 93.2 FL (ref 78–100)
MICROALBUMIN UR-MCNC: <1.2 MG/DL (ref 0–3)
MICROALBUMIN/CREAT UR-RTO: ABNORMAL MG/G (ref 0–30)
MONOCYTES # BLD: 0.44 K/UL (ref 0.05–1.2)
MONOCYTES NFR BLD: 7.3 % (ref 3–10)
NEUTS SEG # BLD: 4.35 K/UL (ref 1.8–8)
NEUTS SEG NFR BLD: 71.7 % (ref 40–73)
NITRITE UR QL STRIP.AUTO: NEGATIVE
NRBC # BLD: 0 K/UL (ref 0–0.01)
NRBC BLD-RTO: 0 PER 100 WBC
PH UR STRIP: 6.5 (ref 5–8)
PLATELET # BLD AUTO: 221 K/UL (ref 135–420)
PMV BLD AUTO: 9.6 FL (ref 9.2–11.8)
POTASSIUM SERPL-SCNC: 4.7 MMOL/L (ref 3.5–5.5)
PROT SERPL-MCNC: 6.6 G/DL (ref 6.4–8.2)
PROT UR STRIP-MCNC: NEGATIVE MG/DL
RBC # BLD AUTO: 5.12 M/UL (ref 4.35–5.65)
RBC #/AREA URNS HPF: NEGATIVE /HPF (ref 0–5)
SODIUM SERPL-SCNC: 141 MMOL/L (ref 136–145)
SP GR UR REFRACTOMETRY: 1.02 (ref 1–1.03)
TRIGL SERPL-MCNC: 86 MG/DL (ref 0–150)
UROBILINOGEN UR QL STRIP.AUTO: 1 EU/DL (ref 0.2–1)
VLDLC SERPL CALC-MCNC: 17 MG/DL
WBC # BLD AUTO: 6.1 K/UL (ref 4.6–13.2)
WBC URNS QL MICRO: NEGATIVE /HPF (ref 0–4)

## 2025-06-03 PROCEDURE — 83036 HEMOGLOBIN GLYCOSYLATED A1C: CPT

## 2025-06-03 PROCEDURE — 82043 UR ALBUMIN QUANTITATIVE: CPT

## 2025-06-03 PROCEDURE — 80061 LIPID PANEL: CPT

## 2025-06-03 PROCEDURE — 82306 VITAMIN D 25 HYDROXY: CPT

## 2025-06-03 PROCEDURE — 80053 COMPREHEN METABOLIC PANEL: CPT

## 2025-06-03 PROCEDURE — 82570 ASSAY OF URINE CREATININE: CPT

## 2025-06-03 PROCEDURE — 81001 URINALYSIS AUTO W/SCOPE: CPT

## 2025-06-03 PROCEDURE — 85025 COMPLETE CBC W/AUTO DIFF WBC: CPT

## 2025-06-03 PROCEDURE — 36415 COLL VENOUS BLD VENIPUNCTURE: CPT

## 2025-06-05 ENCOUNTER — OFFICE VISIT (OUTPATIENT)
Facility: CLINIC | Age: 85
End: 2025-06-05

## 2025-06-05 VITALS
RESPIRATION RATE: 14 BRPM | WEIGHT: 157 LBS | OXYGEN SATURATION: 97 % | BODY MASS INDEX: 26.16 KG/M2 | TEMPERATURE: 98.7 F | SYSTOLIC BLOOD PRESSURE: 112 MMHG | HEIGHT: 65 IN | DIASTOLIC BLOOD PRESSURE: 70 MMHG | HEART RATE: 58 BPM

## 2025-06-05 DIAGNOSIS — E55.9 VITAMIN D DEFICIENCY: ICD-10-CM

## 2025-06-05 DIAGNOSIS — H35.30 MACULAR DEGENERATION OF BOTH EYES, UNSPECIFIED TYPE: ICD-10-CM

## 2025-06-05 DIAGNOSIS — R73.01 IMPAIRED FASTING GLUCOSE: ICD-10-CM

## 2025-06-05 DIAGNOSIS — I10 ESSENTIAL HYPERTENSION: Primary | ICD-10-CM

## 2025-06-05 DIAGNOSIS — H35.3131 EARLY DRY STAGE NONEXUDATIVE AGE-RELATED MACULAR DEGENERATION OF BOTH EYES: ICD-10-CM

## 2025-06-05 DIAGNOSIS — R41.3 MEMORY LOSS: ICD-10-CM

## 2025-06-05 DIAGNOSIS — E78.00 PURE HYPERCHOLESTEROLEMIA: ICD-10-CM

## 2025-06-05 DIAGNOSIS — Z86.0101 HISTORY OF ADENOMATOUS AND SERRATED COLON POLYPS: ICD-10-CM

## 2025-06-05 DIAGNOSIS — E66.3 OVERWEIGHT (BMI 25.0-29.9): ICD-10-CM

## 2025-06-05 DIAGNOSIS — Z00.00 MEDICARE ANNUAL WELLNESS VISIT, SUBSEQUENT: ICD-10-CM

## 2025-06-05 DIAGNOSIS — Z71.89 ACP (ADVANCE CARE PLANNING): ICD-10-CM

## 2025-06-05 DIAGNOSIS — Z85.828 HISTORY OF SQUAMOUS CELL CARCINOMA OF SKIN: ICD-10-CM

## 2025-06-05 DIAGNOSIS — J30.9 ALLERGIC RHINITIS, UNSPECIFIED SEASONALITY, UNSPECIFIED TRIGGER: ICD-10-CM

## 2025-06-05 DIAGNOSIS — S51.831A PUNCTURE WOUND OF RIGHT FOREARM, INITIAL ENCOUNTER: ICD-10-CM

## 2025-06-05 SDOH — ECONOMIC STABILITY: FOOD INSECURITY: WITHIN THE PAST 12 MONTHS, YOU WORRIED THAT YOUR FOOD WOULD RUN OUT BEFORE YOU GOT MONEY TO BUY MORE.: NEVER TRUE

## 2025-06-05 SDOH — ECONOMIC STABILITY: FOOD INSECURITY: WITHIN THE PAST 12 MONTHS, THE FOOD YOU BOUGHT JUST DIDN'T LAST AND YOU DIDN'T HAVE MONEY TO GET MORE.: NEVER TRUE

## 2025-06-05 ASSESSMENT — LIFESTYLE VARIABLES
HOW MANY STANDARD DRINKS CONTAINING ALCOHOL DO YOU HAVE ON A TYPICAL DAY: PATIENT DOES NOT DRINK
HOW OFTEN DO YOU HAVE A DRINK CONTAINING ALCOHOL: NEVER

## 2025-06-05 ASSESSMENT — PATIENT HEALTH QUESTIONNAIRE - PHQ9
SUM OF ALL RESPONSES TO PHQ QUESTIONS 1-9: 0
2. FEELING DOWN, DEPRESSED OR HOPELESS: NOT AT ALL
SUM OF ALL RESPONSES TO PHQ QUESTIONS 1-9: 0
1. LITTLE INTEREST OR PLEASURE IN DOING THINGS: NOT AT ALL
SUM OF ALL RESPONSES TO PHQ QUESTIONS 1-9: 0
SUM OF ALL RESPONSES TO PHQ QUESTIONS 1-9: 0

## 2025-06-05 NOTE — ACP (ADVANCE CARE PLANNING)
(Attempt Resuscitation)    Length of Voluntary ACP Conversation in minutes:  16 minutes    Nalini Blank MD

## 2025-06-05 NOTE — PROGRESS NOTES
HPI:   Tim Stone is a 85 y.o. year old male who presents today for a routine follow-up visit. He has a history of hypertension, hyperlipidemia, impaired fasting glucose, vertigo, colonic adenomas, and diverticulosis. He reports that he is doing reasonably well.      He reports today that he has not noted any decline in his memory since his last visit and feels that he is functioning well.  He is continuing to do some work as an  for his local [employer] and is no longer noticing significant difficulty.  He discusses that he is continuing to exercise at [employer] 2-3 days/week performing cardio and light weights.  He states that he has not been monitoring his blood pressure at home regularly given good control when previously assessed.    He reports today that he is no longer following with Dr. Malone for his routine skin exams, and instead sees Dr. Perear as needed.  He reports that a lesion was removed from his left calf and was found to be skin cancer.  He states no further treatment was felt to be necessary.    In 4/2024, he completed a follow-up visit with Dr. Anderson and was diagnosed with bilateral macular degeneration and was instructed to begin AREDS 2 twice daily.  He subsequently was evaluated by Dr. Florian at [facility] on 7/3/2024 and states that he was told that his macular degeneration is very early and mild. He states that he has not noted any change in his vision.  He  states today that he completed a follow-up visit with Dr. Anderson 2 weeks ago and no change in his macular degeneration was noted.  He states he is in the process of being fitted for new glasses.    He completed a surveillance colonoscopy with Dr. Richards on 3/28/2025 showing a 7 mm sessile polyp in the ascending colon, two 5 mm sessile polyps in the transverse colon, two 5 mm sessile polyps in the rectum, and a 1.5 cm anal polyp vs hemorrhoid (pathology: tubular adenomas) follow-up colonoscopy

## 2025-06-05 NOTE — PROGRESS NOTES
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     Subjective   See office progress note for details.      Patient's complete Health Risk Assessment and screening values have been reviewed and are found in Flowsheets. The following problems were reviewed today and where indicated follow up appointments were made and/or referrals ordered.    No Positive Risk Factors identified today.                                    Objective   Vitals:    06/05/25 0930 06/05/25 0956   BP: (!) 149/85 112/70   Pulse: 58    Resp: 14    Temp: 98.7 °F (37.1 °C)    TempSrc: Temporal    SpO2: 97%    Weight: 71.2 kg (157 lb)    Height: 1.651 m (5' 5\")       Body mass index is 26.13 kg/m².        See office progress note for details.            No Known Allergies  Prior to Visit Medications    Medication Sig Taking? Authorizing Provider   Multiple Vitamins-Minerals (ICAPS AREDS 2 PO) Take 2 tablets by mouth daily Yes Provider,

## 2025-06-05 NOTE — PATIENT INSTRUCTIONS
vaccine  Aged Out   • Meningococcal (ACWY) vaccine  Aged Out   • Meningococcal B vaccine  Aged Out           A Healthy Heart: Care Instructions  Overview     Coronary artery disease, also called heart disease, occurs when a substance called plaque builds up in the vessels that supply oxygen-rich blood to your heart muscle. This can narrow the blood vessels and reduce blood flow. A heart attack happens when blood flow is completely blocked. A high-fat diet, smoking, and other factors increase the risk of heart disease.  Your doctor has found that you have a chance of having heart disease. A heart-healthy lifestyle can help keep your heart healthy and prevent heart disease. This lifestyle includes eating healthy, being active, staying at a weight that's healthy for you, and not smoking or using tobacco. It also includes taking medicines as directed, managing other health conditions, and trying to get a healthy amount of sleep.  Follow-up care is a key part of your treatment and safety. Be sure to make and go to all appointments, and call your doctor if you are having problems. It's also a good idea to know your test results and keep a list of the medicines you take.  How can you care for yourself at home?  Diet    Use less salt when you cook and eat. This helps lower your blood pressure. Taste food before salting. Add only a little salt when you think you need it. With time, your taste buds will adjust to less salt.     Eat fewer snack items, fast foods, canned soups, and other high-salt, high-fat, processed foods.     Read food labels and try to avoid saturated and trans fats. They increase your risk of heart disease by raising cholesterol levels.     Limit the amount of solid fat--butter, margarine, and shortening--you eat. Use olive, peanut, or canola oil when you cook. Bake, broil, and steam foods instead of frying them.     Eat a variety of fruit and vegetables every day. Dark green, deep orange, red, or yellow

## 2025-06-05 NOTE — PROGRESS NOTES
Tim Stone presents today for   Chief Complaint   Patient presents with    Medicare AWV       \"Have you been to the ER, urgent care clinic since your last visit?  Hospitalized since your last visit?\"    NO    “Have you seen or consulted any other health care providers outside of [facility] since your last visit?”    NO

## (undated) DEVICE — CATHETER SUCT TR FL TIP 14FR W/ O CTRL

## (undated) DEVICE — CANNULA ORIG TL CLR W FOAM CUSHIONS AND 14FT SUPL TB 3 CHN

## (undated) DEVICE — 3M™ MICROFOAM™ SURGICAL TAPE 4 ROLLS/CARTON 6 CARTONS/CASE 1528-3: Brand: 3M™ MICROFOAM™

## (undated) DEVICE — DECANTER BAG 9": Brand: MEDLINE INDUSTRIES, INC.

## (undated) DEVICE — FORCEPS BX L240CM JAW DIA2.8MM L CAP W/ NDL MIC MESH TOOTH

## (undated) DEVICE — SNARE POLYP M W27MMXL240CM OVL STIFF DISP CAPTIVATOR

## (undated) DEVICE — PADDING CAST W6INXL4YD ST COT COHESIVE HND TEARABLE SPEC

## (undated) DEVICE — SUTURE VCRL SZ 1 L18IN ABSRB VLT CT-1 L36MM 1/2 CIR J741D

## (undated) DEVICE — GOWN ISOL IMPERV UNIV, DISP, OPEN BACK, BLUE --

## (undated) DEVICE — 1010 S-DRAPE TOWEL DRAPE 10/BX: Brand: STERI-DRAPE™

## (undated) DEVICE — SYR 50ML SLIP TIP NSAF LF STRL --

## (undated) DEVICE — CATHETER THOR 36FR DIA10.7MM POLYVI CHL TRCR TIP STR SFT

## (undated) DEVICE — BANDAGE COMPR W6INXL5YD WHT BGE POLY COT M E WRP WV HK AND

## (undated) DEVICE — TUBING IRRIG HI FLO RIO SYS ANSPACH EMAX 2

## (undated) DEVICE — GOWN PLASTIC FILM THMBHKS UNIV BLUE: Brand: CARDINAL HEALTH

## (undated) DEVICE — FLUFF AND POLYMER UNDERPAD,EXTRA HEAVY: Brand: WINGS

## (undated) DEVICE — FLEX ADVANTAGE 3000CC: Brand: FLEX ADVANTAGE

## (undated) DEVICE — ADHESIVE SKIN CLSR 0.7ML TOP DERMBND ADV

## (undated) DEVICE — BLADE, TONGUE, 6", STERILE: Brand: MEDLINE

## (undated) DEVICE — SUTURE ABSORBABLE MONOFILAMENT 3-0 PS-2 27 IN UD MONOCRYL + SXMP1B109

## (undated) DEVICE — TAPE,CLOTH/SILK,CURAD,3"X10YD,LF,40/CS: Brand: CURAD

## (undated) DEVICE — SOLUTION IV 1000ML 0.9% SOD CHL

## (undated) DEVICE — SOLUTION IRRIG 1000ML H2O STRL BLT

## (undated) DEVICE — BOWL MED L 32OZ PLAS W/ MOLD GRAD EZ OPN PEEL PCH

## (undated) DEVICE — GLOVE SURG SZ 85 L12IN FNGR THK79MIL GRN LTX FREE

## (undated) DEVICE — SYRINGE MED 50ML LUERLOCK TIP

## (undated) DEVICE — 4-PORT MANIFOLD: Brand: NEPTUNE 2

## (undated) DEVICE — INTENDED FOR TISSUE SEPARATION, AND OTHER PROCEDURES THAT REQUIRE A SHARP SURGICAL BLADE TO PUNCTURE OR CUT.: Brand: BARD-PARKER ® CARBON RIB-BACK BLADES

## (undated) DEVICE — PACK SURG BSHR TOT KNEE LF

## (undated) DEVICE — SUTURE VCRL SZ 2-0 L36IN ABSRB UD L36MM CT-1 1/2 CIR J945H

## (undated) DEVICE — KIT TRK KNEE PROC VIZADISC

## (undated) DEVICE — GAUZE,SPONGE,4"X4",16PLY,STRL,LF,10/TRAY: Brand: MEDLINE

## (undated) DEVICE — HOOD WITH PEEL AWAY FACE SHIELD: Brand: T7PLUS

## (undated) DEVICE — YANKAUER,SMOOTH HANDLE,HIGH CAPACITY: Brand: MEDLINE INDUSTRIES, INC.

## (undated) DEVICE — MEDI-VAC NON-CONDUCTIVE SUCTION TUBING: Brand: CARDINAL HEALTH

## (undated) DEVICE — SOLUTION IRRIG 3000ML 0.9% SOD CHL FLX CONT 0797208] ICU MEDICAL INC]

## (undated) DEVICE — BOWL AND CEMENT CARTRIDGE WITH BREAKAWAY FEMORAL NOZZLE: Brand: ACM

## (undated) DEVICE — MEDI-VAC SUCTION HIGH CAPACITY: Brand: CARDINAL HEALTH

## (undated) DEVICE — LINER SUCT CANSTR 3000CC PLAS SFT PRE ASSEMB W/OUT TBNG W/

## (undated) DEVICE — SYR 20ML LL STRL LF --

## (undated) DEVICE — UNDERPAD INCONT W23XL36IN STD BLU POLYPR BK FLUF SFT

## (undated) DEVICE — KIT INT FIX FEM TIB CKPT MAKOPLASTY

## (undated) DEVICE — 2C14 #2 PDO 36 X 36: Brand: 2C14 #2 PDO 36 X 36

## (undated) DEVICE — SUTURE ETHLN SZ 3-0 L18IN NONABSORBABLE BLK L24MM PS-1 3/8 1663G

## (undated) DEVICE — ZIPPERED TOGA, X-LARGE: Brand: FLYTE

## (undated) DEVICE — APPLICATOR MEDICATED 26 CC SOLUTION HI LT ORNG CHLORAPREP

## (undated) DEVICE — NEEDLE SPNL 18GA L3.5IN W/ QNCKE SHARPER BVL DURA CLICK

## (undated) DEVICE — KIT CLN UP BON SECOURS MARYV

## (undated) DEVICE — 2DSM19 2-0 UND MONODERM 30X30: Brand: 2DSM19 2-0 UND MONODERM 30X30

## (undated) DEVICE — TRAP SPEC COLL POLYP POLYSTYR --

## (undated) DEVICE — CLIP IRRIGATION MICS STERILE

## (undated) DEVICE — GLOVE SURG SZ 8 CRM LTX FREE POLYISOPRENE POLYMER BEAD ANTI

## (undated) DEVICE — SYRINGE MED 25GA 3ML L5/8IN SUBQ PLAS W/ DETACH NDL SFTY

## (undated) DEVICE — CANNULA NSL AD TBNG L14FT STD PVC O2 CRV CONN NONFLARED NSL

## (undated) DEVICE — ELECTRODE PT RET AD L9FT HI MOIST COND ADH HYDRGEL CORDED

## (undated) DEVICE — SYRINGE MED 50ML LUERSLIP TIP

## (undated) DEVICE — ELECTRODE ELECSURG 2 PLATE AD 10 FT 33 LB PT RET MEGADYNE

## (undated) DEVICE — HANDPIECE SET WITH HIGH FLOW TIP AND SUCTION TUBE: Brand: INTERPULSE

## (undated) DEVICE — KIT DRP FOR RIO ROBOTIC ARM ASST SYS

## (undated) DEVICE — BOOT POS LEG DEMAYO

## (undated) DEVICE — ENDOSCOPY PUMP TUBING/ CAP SET: Brand: ERBE

## (undated) DEVICE — SYRINGE 20ML LL S/C 50

## (undated) DEVICE — Device: Brand: JELCO

## (undated) DEVICE — BLADE SURG SAW STD S STL OSC W/ SERR EDGE DISP

## (undated) DEVICE — 2108 SERIES SAGITTAL BLADE (28.9 X 0.64 X 58.7MM)

## (undated) DEVICE — PIN BNE FIX L110MM DIA32MM

## (undated) DEVICE — BLADE SURG SAW S STL NAR OSC W/ SERR EDGE DISP

## (undated) DEVICE — SINGLE-USE POLYPECTOMY SNARE: Brand: CAPTIFLEX

## (undated) DEVICE — BNDG,ELSTC,MATRIX,STRL,6"X5YD,LF,HOOK&LP: Brand: MEDLINE

## (undated) DEVICE — SYRINGE MED 10ML LUERLOCK TIP W/O SFTY DISP

## (undated) DEVICE — REM POLYHESIVE ADULT PATIENT RETURN ELECTRODE: Brand: VALLEYLAB

## (undated) DEVICE — STERILE PVP: Brand: MEDLINE INDUSTRIES, INC.

## (undated) DEVICE — 3M™ STERI-DRAPE™ U-DRAPE 1015: Brand: STERI-DRAPE™

## (undated) DEVICE — SUTURE MCRYL SZ 2-0 L36IN ABSRB UD L36MM CT-1 1/2 CIR Y945H

## (undated) DEVICE — PREMIUM DRY TRAY LF: Brand: MEDLINE INDUSTRIES, INC.

## (undated) DEVICE — SYR 10ML LUER LOK 1/5ML GRAD --